# Patient Record
Sex: FEMALE | Race: WHITE | NOT HISPANIC OR LATINO | Employment: FULL TIME | URBAN - NONMETROPOLITAN AREA
[De-identification: names, ages, dates, MRNs, and addresses within clinical notes are randomized per-mention and may not be internally consistent; named-entity substitution may affect disease eponyms.]

---

## 2017-01-19 VITALS
SYSTOLIC BLOOD PRESSURE: 122 MMHG | DIASTOLIC BLOOD PRESSURE: 60 MMHG | HEIGHT: 66 IN | BODY MASS INDEX: 31.82 KG/M2 | WEIGHT: 198 LBS

## 2017-01-20 ENCOUNTER — OFFICE VISIT (OUTPATIENT)
Dept: OBSTETRICS AND GYNECOLOGY | Facility: CLINIC | Age: 27
End: 2017-01-20

## 2017-01-20 VITALS
SYSTOLIC BLOOD PRESSURE: 122 MMHG | HEIGHT: 67 IN | BODY MASS INDEX: 32.49 KG/M2 | DIASTOLIC BLOOD PRESSURE: 68 MMHG | WEIGHT: 207 LBS

## 2017-01-20 DIAGNOSIS — Z30.46 NEXPLANON REMOVAL: Primary | ICD-10-CM

## 2017-01-20 PROCEDURE — 11982 REMOVE DRUG IMPLANT DEVICE: CPT | Performed by: NURSE PRACTITIONER

## 2017-01-20 NOTE — MR AVS SNAPSHOT
Nelsy Bill   2017 9:30 AM   Office Visit    Dept Phone:  [phone]   Encounter #:  [id number]    Provider:  Shana Green CNM   Department:  [facility] OBSTETRICS AND GYNECOLOGY                Your Full Care Plan              Your Updated Medication List      Notice  As of 2017 10:08 AM    You have not been prescribed any medications.            Instructions     None    Patient Instructions History      Upcoming Appointments     Visit Type Date Time Department    NEXPLANON  2017  9:30 AM KAYLYNN BARRAZA    ANNUAL 2017  9:00 AM [place] DEREK BARRAZA      MyChart Signup     [facility] Clipik allows you to send messages to your doctor, view your test results, renew your prescriptions, schedule appointments, and more. To sign up, go to [employer] and click on the Sign Up Now link in the New User? box. Enter your Clipik Activation Code exactly as it appears below along with the last four digits of your Social Security Number and your Date of Birth () to complete the sign-up process. If you do not sign up before the expiration date, you must request a new code.    Clipik Activation Code: [email or web address]  Expires: 2/3/2017 10:08 AM    If you have questions, you can email [email or web address] or call [phone] to talk to our Clipik staff. Remember, Clipik is NOT to be used for urgent needs. For medical emergencies, dial 911.               Other Info from Your Visit           Your Appointments     2017  9:00 AM EST   Annual with Ada Mata PA-C   [facility] OBSTETRICS AND GYNECOLOGY (--)    [address] [address] [phone]              Allergies     No Known Allergies      Reason for Visit     Contraception Nexplanon removal, desires pregnancy, inserted in left arm on 16.       Vital Signs     Blood Pressure Height Weight Breastfeeding?  "Body Mass Index Smoking Status    122/68 67\" (170.2 cm) 207 lb (93.9 kg) Yes 32.42 kg/m2 Never Smoker        "

## 2017-01-20 NOTE — PROGRESS NOTES
Nexplanon Removal    Date of procedure:  1/20/2017    Risks and benefits discussed? yes  All questions answered? yes  Consents given by the patient  Written consent obtained? Yes        Local anesthesia used:  yes - 1.5 cc's of local anesthesia: 1% lidocaine with epinephrine    Procedure documentation:    The upper left arm was marked at the intended site of removal.  Betadine was used to cleanse the skin.  Local anesthesia was injected.  A vertical incision was created at the distal tip of the implant.  The implant was removed intact without difficulty.  Steri-strips were then placed across the site of insertion and the arm was wrapped.    She tolerated the procedure well.  There were no complications.  EBL was minimal.    Post procedure instructions: Remove the wrapping in 24 hours and the steri-strips in 5 days.    Follow up needed: PRN    This note was electronically signed.    Shana Green CNM   January 20, 2017

## 2017-02-09 ENCOUNTER — OFFICE VISIT (OUTPATIENT)
Dept: OBSTETRICS AND GYNECOLOGY | Facility: CLINIC | Age: 27
End: 2017-02-09

## 2017-02-09 VITALS
BODY MASS INDEX: 32.33 KG/M2 | DIASTOLIC BLOOD PRESSURE: 72 MMHG | WEIGHT: 206 LBS | HEIGHT: 67 IN | SYSTOLIC BLOOD PRESSURE: 114 MMHG

## 2017-02-09 DIAGNOSIS — Z01.419 ENCOUNTER FOR GYNECOLOGICAL EXAMINATION WITHOUT ABNORMAL FINDING: Primary | ICD-10-CM

## 2017-02-09 DIAGNOSIS — Z12.4 SCREENING FOR MALIGNANT NEOPLASM OF CERVIX: ICD-10-CM

## 2017-02-09 DIAGNOSIS — Z32.02 NEGATIVE PREGNANCY TEST: ICD-10-CM

## 2017-02-09 LAB
B-HCG UR QL: NEGATIVE
INTERNAL NEGATIVE CONTROL: NEGATIVE
INTERNAL POSITIVE CONTROL: POSITIVE
Lab: NORMAL

## 2017-02-09 PROCEDURE — 99395 PREV VISIT EST AGE 18-39: CPT | Performed by: PHYSICIAN ASSISTANT

## 2017-02-09 PROCEDURE — 81025 URINE PREGNANCY TEST: CPT | Performed by: PHYSICIAN ASSISTANT

## 2017-02-09 NOTE — PROGRESS NOTES
"Subjective   Chief Complaint   Patient presents with   • Gynecologic Exam     Nelsy Bill is a 26 y.o. year old  presenting to be seen for her annual exam.   She has no complaints or concerns today  She had nexplanon removed recently--it is OK if she conceives  LMP 2017      History reviewed. No pertinent past medical history.   No current outpatient prescriptions on file.   Allergies   Allergen Reactions   • Mastisol [Wound Dressing Adhesive]    • Other      Steri-strips      Past Surgical History   Procedure Laterality Date   •  section  ,      x's 2   • Knee acl reconstruction        Social History     Social History   • Marital status:      Spouse name: N/A   • Number of children: N/A   • Years of education: N/A     Occupational History   • Not on file.     Social History Main Topics   • Smoking status: Never Smoker   • Smokeless tobacco: Never Used   • Alcohol use No   • Drug use: No   • Sexual activity: Yes     Other Topics Concern   • Not on file     Social History Narrative      Family History   Problem Relation Age of Onset   • Prostate cancer Paternal Grandfather    • Cancer Paternal Grandmother    • Lung cancer Maternal Grandmother    • Prostate cancer Maternal Grandfather        The following portions of the patient's history were reviewed and updated as appropriate:problem list, current medications, allergies, past family history, past medical history, past social history and past surgical history.    Review of Systems        Objective   Visit Vitals   • /72   • Ht 66.5\" (168.9 cm)   • Wt 206 lb (93.4 kg)   • LMP 2017   • BMI 32.75 kg/m2       Physical Exam   Constitutional: She appears well-developed and well-nourished.   Pulmonary/Chest: Right breast exhibits no inverted nipple, no mass, no nipple discharge, no skin change and no tenderness. Left breast exhibits no inverted nipple, no mass, no nipple discharge, no skin change and no tenderness. " Breasts are symmetrical.   Abdominal: Soft. Normal appearance. She exhibits no distension and no mass. There is no tenderness.   Genitourinary: Vagina normal and uterus normal. There is no rash or tenderness on the right labia. There is no rash or tenderness on the left labia. Cervix exhibits no motion tenderness and no discharge. Right adnexum displays no mass and no tenderness. Left adnexum displays no mass and no tenderness.   Genitourinary Comments: Pap done   Skin: Skin is warm and dry.   Psychiatric: She has a normal mood and affect. Her behavior is normal.            Assessment /Plan      Nelsy was seen today for gynecologic exam.    Diagnoses and all orders for this visit:    Encounter for gynecological examination without abnormal finding    Screening for malignant neoplasm of cervix  -     Liquid-based Pap Smear, Screening                 This note was electronically signed.    Ada Mata PA-C   February 9, 2017

## 2017-09-06 ENCOUNTER — OFFICE VISIT (OUTPATIENT)
Dept: OBSTETRICS AND GYNECOLOGY | Facility: CLINIC | Age: 27
End: 2017-09-06

## 2017-09-06 VITALS
SYSTOLIC BLOOD PRESSURE: 110 MMHG | WEIGHT: 194 LBS | DIASTOLIC BLOOD PRESSURE: 64 MMHG | BODY MASS INDEX: 30.45 KG/M2 | HEIGHT: 67 IN

## 2017-09-06 DIAGNOSIS — N92.6 IRREGULAR MENSES: Primary | ICD-10-CM

## 2017-09-06 DIAGNOSIS — Z31.9 PATIENT DESIRES PREGNANCY: ICD-10-CM

## 2017-09-06 PROCEDURE — 99213 OFFICE O/P EST LOW 20 MIN: CPT | Performed by: PHYSICIAN ASSISTANT

## 2017-09-06 RX ORDER — PNV NO.95/FERROUS FUM/FOLIC AC 28MG-0.8MG
1 TABLET ORAL DAILY
Qty: 90 TABLET | Refills: 3 | Status: SHIPPED | OUTPATIENT
Start: 2017-09-06 | End: 2018-01-11 | Stop reason: CLARIF

## 2017-09-06 NOTE — PROGRESS NOTES
"Subjective   Chief Complaint   Patient presents with   • Follow-up     Patient wants to discuss conception.     /64  Ht 66.5\" (168.9 cm)  Wt 194 lb (88 kg)  BMI 30.84 kg/m2   Nelsy Bill is a 26 y.o. year old  presenting to be seen for desiring conception  She has 2 children ages 7 and 2 and had no difficulty conceiving previously  She had nexplanon removed 2017  She reports cycles slightly irregular since removal-, May 12-15, Roslyn 10-13, ,  spotted, Aug 28-30 had another bleed  She has not attempted to document ovulation thus far   healthy and is father of her 2 children   She had normal pap 2017    No past medical history on file.     Current Outpatient Prescriptions:   •  Prenatal Vit-Fe Fumarate-FA (PRENATAL VITAMINS) 28-0.8 MG tablet, Take 1 tablet by mouth Daily., Disp: 90 tablet, Rfl: 3   Allergies   Allergen Reactions   • Mastisol [Wound Dressing Adhesive]    • Other      Steri-strips      Past Surgical History:   Procedure Laterality Date   •  SECTION  ,     x's 2   • KNEE ACL RECONSTRUCTION        Social History     Social History   • Marital status:      Spouse name: N/A   • Number of children: N/A   • Years of education: N/A     Occupational History   • Not on file.     Social History Main Topics   • Smoking status: Never Smoker   • Smokeless tobacco: Never Used   • Alcohol use No   • Drug use: No   • Sexual activity: Yes     Other Topics Concern   • Not on file     Social History Narrative      Family History   Problem Relation Age of Onset   • Prostate cancer Paternal Grandfather    • Cancer Paternal Grandmother    • Lung cancer Maternal Grandmother    • Prostate cancer Maternal Grandfather        The following portions of the patient's history were reviewed and updated as appropriate:problem list, current medications, allergies, past family history, past medical history, past social history and " past surgical history.    Review of Systems   Constitutional: Negative.    Gastrointestinal: Negative.    Genitourinary: Negative.         Objective     Physical Exam  Nelsy was seen today for follow-up.    Diagnoses and all orders for this visit:    Irregular menses  -     TSH; Future  -     Progesterone; Future    Patient desires pregnancy    Other orders  -     Prenatal Vit-Fe Fumarate-FA (PRENATAL VITAMINS) 28-0.8 MG tablet; Take 1 tablet by mouth Daily.             This note was electronically signed.    Ada Mata PA-C   September 6, 2017

## 2017-09-06 NOTE — PATIENT INSTRUCTIONS
Will check TSH and serum progesterone level day 21 or 22 of cycle  Also recommend starting ovulation predictor kit  Discussed timed intercourse   Start prenatal vitamins  Follow up in 3 months if no conception

## 2017-09-11 ENCOUNTER — RESULTS ENCOUNTER (OUTPATIENT)
Dept: OBSTETRICS AND GYNECOLOGY | Facility: CLINIC | Age: 27
End: 2017-09-11

## 2017-09-11 DIAGNOSIS — N92.6 IRREGULAR MENSES: ICD-10-CM

## 2017-09-19 LAB
PROGEST SERPL-MCNC: 5.5 NG/ML
TSH SERPL DL<=0.005 MIU/L-ACNC: 1.9 MIU/ML (ref 0.47–4.68)

## 2017-11-01 ENCOUNTER — INITIAL PRENATAL (OUTPATIENT)
Dept: OBSTETRICS AND GYNECOLOGY | Facility: CLINIC | Age: 27
End: 2017-11-01

## 2017-11-01 VITALS — DIASTOLIC BLOOD PRESSURE: 64 MMHG | BODY MASS INDEX: 31.32 KG/M2 | WEIGHT: 197 LBS | SYSTOLIC BLOOD PRESSURE: 124 MMHG

## 2017-11-01 DIAGNOSIS — Z34.90 PREGNANCY, UNSPECIFIED GESTATIONAL AGE: Primary | ICD-10-CM

## 2017-11-01 DIAGNOSIS — Z34.91 NORMAL PREGNANCY, FIRST TRIMESTER: ICD-10-CM

## 2017-11-01 PROCEDURE — 90471 IMMUNIZATION ADMIN: CPT | Performed by: NURSE PRACTITIONER

## 2017-11-01 PROCEDURE — 90686 IIV4 VACC NO PRSV 0.5 ML IM: CPT | Performed by: NURSE PRACTITIONER

## 2017-11-01 PROCEDURE — 99214 OFFICE O/P EST MOD 30 MIN: CPT | Performed by: NURSE PRACTITIONER

## 2017-11-01 NOTE — PROGRESS NOTES
Chief Complaint   Patient presents with   • Initial Prenatal Visit     LMP 17, 8w6d by scan today, last pap 17 WNL, no complaints         HPI  , 8w6d presents to our office today for confirmation of pregnancy.  She reports 1st trimester discomforts of pregnancy including, fatigue and nausea and vomiting - able to tolerate fluids.    Previous C/S's at term  Working at [email or web address] work  Taking PNV's   Last pap - past year - normal         History reviewed. No pertinent past medical history.     Current Outpatient Prescriptions:   •  Prenatal Vit-Fe Fumarate-FA (PRENATAL VITAMINS) 28-0.8 MG tablet, Take 1 tablet by mouth Daily., Disp: 90 tablet, Rfl: 3   Allergies   Allergen Reactions   • Mastisol [Wound Dressing Adhesive]    • Other      Steri-strips      Past Surgical History:   Procedure Laterality Date   •  SECTION  ,     x's 2   • KNEE ACL RECONSTRUCTION         Social History     Social History   • Marital status:      Spouse name: N/A   • Number of children: N/A   • Years of education: N/A     Occupational History   • Not on file.     Social History Main Topics   • Smoking status: Never Smoker   • Smokeless tobacco: Never Used   • Alcohol use No   • Drug use: No   • Sexual activity: Yes     Other Topics Concern   • Not on file     Social History Narrative      Family History   Problem Relation Age of Onset   • Prostate cancer Paternal Grandfather    • Cancer Paternal Grandmother    • Lung cancer Maternal Grandmother    • Prostate cancer Maternal Grandfather        The following portions of the patient's history were reviewed and updated as appropriate:problem list, current medications, allergies, past family history, past medical history, past social history and past surgical history.    ROS    Pertinent items are noted in HPI, all other systems reviewed and negative    Physical Exam  /64  Wt 197 lb (89.4 kg)  LMP 2017 (Exact Date)  BMI 31.32 kg/m2     Psych:  Altert and oriented to time, place and person  Mood and affect appropriate   General: well developed; well nourished  no acute distress  HEENT: unremarkable  Neck: The neck is supple and the trachea is midline  Musculoskeletal: Normal gait  Full range of motion  Lungs:  breathing is unlabored  Back: Negative CVAT  Abdomen: Soft, non-tender, no organomegaly  Lower Extremities: LE: Neg edema and  Neg. Clonus      MDM  Impression:  Problems/Risks: Normal Pregnancy  Previous C/S with planned repeat C/S   Tests done today: Jasmyne SELLERSB labs with HIV & option CF screening  TVS for dating   Topics discussed: Jasmyne PISANO education including nutrition, exercise, OTCmeds  T-dap &/or flu vaccination  adequate rest and fluids  encouraged questions - call prn    Tests next visit: none

## 2017-11-02 LAB
ABO GROUP BLD: (no result)
BASOPHILS # BLD AUTO: 0 X10E3/UL (ref 0–0.2)
BASOPHILS NFR BLD AUTO: 1 %
BLD GP AB SCN SERPL QL: NEGATIVE
EOSINOPHIL # BLD AUTO: 0.1 X10E3/UL (ref 0–0.4)
EOSINOPHIL NFR BLD AUTO: 2 %
ERYTHROCYTE [DISTWIDTH] IN BLOOD BY AUTOMATED COUNT: 13.4 % (ref 12.3–15.4)
HBV SURFACE AG SERPL QL IA: NEGATIVE
HCT VFR BLD AUTO: 38.2 % (ref 34–46.6)
HGB BLD-MCNC: 13.3 G/DL (ref 11.1–15.9)
HIV 1+2 AB+HIV1 P24 AG SERPL QL IA: NON REACTIVE
IMM GRANULOCYTES # BLD: 0 X10E3/UL (ref 0–0.1)
IMM GRANULOCYTES NFR BLD: 0 %
LYMPHOCYTES # BLD AUTO: 1.5 X10E3/UL (ref 0.7–3.1)
LYMPHOCYTES NFR BLD AUTO: 26 %
MCH RBC QN AUTO: 30.2 PG (ref 26.6–33)
MCHC RBC AUTO-ENTMCNC: 34.8 G/DL (ref 31.5–35.7)
MCV RBC AUTO: 87 FL (ref 79–97)
MONOCYTES # BLD AUTO: 0.5 X10E3/UL (ref 0.1–0.9)
MONOCYTES NFR BLD AUTO: 8 %
NEUTROPHILS # BLD AUTO: 3.7 X10E3/UL (ref 1.4–7)
NEUTROPHILS NFR BLD AUTO: 63 %
PLATELET # BLD AUTO: 304 X10E3/UL (ref 150–379)
RBC # BLD AUTO: 4.4 X10E6/UL (ref 3.77–5.28)
RH BLD: POSITIVE
RPR SER QL: NON REACTIVE
RUBV IGG SERPL IA-ACNC: 1.16 INDEX
WBC # BLD AUTO: 5.8 X10E3/UL (ref 3.4–10.8)

## 2017-11-27 ENCOUNTER — ROUTINE PRENATAL (OUTPATIENT)
Dept: OBSTETRICS AND GYNECOLOGY | Facility: CLINIC | Age: 27
End: 2017-11-27

## 2017-11-27 VITALS — SYSTOLIC BLOOD PRESSURE: 122 MMHG | WEIGHT: 197 LBS | BODY MASS INDEX: 31.32 KG/M2 | DIASTOLIC BLOOD PRESSURE: 62 MMHG

## 2017-11-27 DIAGNOSIS — Z34.92 NORMAL PREGNANCY, SECOND TRIMESTER: Primary | ICD-10-CM

## 2017-11-27 PROCEDURE — 0502F SUBSEQUENT PRENATAL CARE: CPT | Performed by: NURSE PRACTITIONER

## 2017-11-27 NOTE — PATIENT INSTRUCTIONS
Second Trimester of Pregnancy  The second trimester is from week 13 through week 28, months 4 through 6. The second trimester is often a time when you feel your best. Your body has also adjusted to being pregnant, and you begin to feel better physically. Usually, morning sickness has lessened or quit completely, you may have more energy, and you may have an increase in appetite. The second trimester is also a time when the fetus is growing rapidly. At the end of the sixth month, the fetus is about 9 inches long and weighs about 1½ pounds. You will likely begin to feel the baby move (quickening) between 18 and 20 weeks of the pregnancy.  BODY CHANGES  Your body goes through many changes during pregnancy. The changes vary from woman to woman.   · Your weight will continue to increase. You will notice your lower abdomen bulging out.  · You may begin to get stretch marks on your hips, abdomen, and breasts.  · You may develop headaches that can be relieved by medicines approved by your health care provider.  · You may urinate more often because the fetus is pressing on your bladder.  · You may develop or continue to have heartburn as a result of your pregnancy.  · You may develop constipation because certain hormones are causing the muscles that push waste through your intestines to slow down.  · You may develop hemorrhoids or swollen, bulging veins (varicose veins).  · You may have back pain because of the weight gain and pregnancy hormones relaxing your joints between the bones in your pelvis and as a result of a shift in weight and the muscles that support your balance.  · Your breasts will continue to grow and be tender.  · Your gums may bleed and may be sensitive to brushing and flossing.  · Dark spots or blotches (chloasma, mask of pregnancy) may develop on your face. This will likely fade after the baby is born.  · A dark line from your belly button to the pubic area (linea nigra) may appear. This will likely fade  after the baby is born.  · You may have changes in your hair. These can include thickening of your hair, rapid growth, and changes in texture. Some women also have hair loss during or after pregnancy, or hair that feels dry or thin. Your hair will most likely return to normal after your baby is born.  WHAT TO EXPECT AT YOUR PRENATAL VISITS  During a routine prenatal visit:  · You will be weighed to make sure you and the fetus are growing normally.  · Your blood pressure will be taken.  · Your abdomen will be measured to track your baby's growth.  · The fetal heartbeat will be listened to.  · Any test results from the previous visit will be discussed.  Your health care provider may ask you:  · How you are feeling.  · If you are feeling the baby move.  · If you have had any abnormal symptoms, such as leaking fluid, bleeding, severe headaches, or abdominal cramping.  · If you are using any tobacco products, including cigarettes, chewing tobacco, and electronic cigarettes.  · If you have any questions.  Other tests that may be performed during your second trimester include:  · Blood tests that check for:  ¨ Low iron levels (anemia).  ¨ Gestational diabetes (between 24 and 28 weeks).  ¨ Rh antibodies.  · Urine tests to check for infections, diabetes, or protein in the urine.  · An ultrasound to confirm the proper growth and development of the baby.  · An amniocentesis to check for possible genetic problems.  · Fetal screens for spina bifida and Down syndrome.  · HIV (human immunodeficiency virus) testing. Routine prenatal testing includes screening for HIV, unless you choose not to have this test.  HOME CARE INSTRUCTIONS   · Avoid all smoking, herbs, alcohol, and unprescribed drugs. These chemicals affect the formation and growth of the baby.  · Do not use any tobacco products, including cigarettes, chewing tobacco, and electronic cigarettes. If you need help quitting, ask your health care provider. You may receive  counseling support and other resources to help you quit.  · Follow your health care provider's instructions regarding medicine use. There are medicines that are either safe or unsafe to take during pregnancy.  · Exercise only as directed by your health care provider. Experiencing uterine cramps is a good sign to stop exercising.  · Continue to eat regular, healthy meals.  · Wear a good support bra for breast tenderness.  · Do not use hot tubs, steam rooms, or saunas.  · Wear your seat belt at all times when driving.  · Avoid raw meat, uncooked cheese, cat litter boxes, and soil used by cats. These carry germs that can cause birth defects in the baby.  · Take your prenatal vitamins.  · Take 7387-8405 mg of calcium daily starting at the 20th week of pregnancy until you deliver your baby.  · Try taking a stool softener (if your health care provider approves) if you develop constipation. Eat more high-fiber foods, such as fresh vegetables or fruit and whole grains. Drink plenty of fluids to keep your urine clear or pale yellow.  · Take warm sitz baths to soothe any pain or discomfort caused by hemorrhoids. Use hemorrhoid cream if your health care provider approves.  · If you develop varicose veins, wear support hose. Elevate your feet for 15 minutes, 3-4 times a day. Limit salt in your diet.  · Avoid heavy lifting, wear low heel shoes, and practice good posture.  · Rest with your legs elevated if you have leg cramps or low back pain.  · Visit your dentist if you have not gone yet during your pregnancy. Use a soft toothbrush to brush your teeth and be gentle when you floss.  · A sexual relationship may be continued unless your health care provider directs you otherwise.  · Continue to go to all your prenatal visits as directed by your health care provider.  SEEK MEDICAL CARE IF:   · You have dizziness.  · You have mild pelvic cramps, pelvic pressure, or nagging pain in the abdominal area.  · You have persistent nausea,  vomiting, or diarrhea.  · You have a bad smelling vaginal discharge.  · You have pain with urination.  SEEK IMMEDIATE MEDICAL CARE IF:   · You have a fever.  · You are leaking fluid from your vagina.  · You have spotting or bleeding from your vagina.  · You have severe abdominal cramping or pain.  · You have rapid weight gain or loss.  · You have shortness of breath with chest pain.  · You notice sudden or extreme swelling of your face, hands, ankles, feet, or legs.  · You have not felt your baby move in over an hour.  · You have severe headaches that do not go away with medicine.  · You have vision changes.     This information is not intended to replace advice given to you by your health care provider. Make sure you discuss any questions you have with your health care provider.

## 2017-11-27 NOTE — PROGRESS NOTES
[id number]  Chief Complaint   Patient presents with   • Routine Prenatal Visit     no complaints         HPI  , 12w4d reports doing well - though 1st trimester discomforts continue - though improving   Has done flu vac  Hx of G1 preeclampsia - G2 normal pregnancy     ROS:  GI: Nausea - improved as compared to the prior visit; Constipation - No; Diarrhea - No    Neuro: Headache - No; Visual change - No        EXAM  General Appearance: relaxed   Lungs: Breathing unlabored  Abdomen:  See flow sheet for Fundal ht, FM, FHT's  LE: Neg edema    MDM  Impression:  Problems/Risk: Normal Pregnancy  Previous C/S with planned repeat C/S  .Hx of preeclampsia G1      First trimester discomforts of preg continue   Topics discussed: Adequate rest and fluids  option for phenergan - declined   Hx preeclampsia - rev'd - may do ASA 81 mg daily    Tests next visit: quad screen optional      OB History      Para Term  AB Living    3 2 2       SAB TAB Ectopic Multiple Live Births                  History reviewed. No pertinent past medical history.    Past Surgical History:   Procedure Laterality Date   •  SECTION  ,     x's 2   • KNEE ACL RECONSTRUCTION         Family History   Problem Relation Age of Onset   • Prostate cancer Paternal Grandfather    • Cancer Paternal Grandmother    • Lung cancer Maternal Grandmother    • Prostate cancer Maternal Grandfather        Social History     Social History   • Marital status:      Spouse name: N/A   • Number of children: N/A   • Years of education: N/A     Occupational History   • Not on file.     Social History Main Topics   • Smoking status: Never Smoker   • Smokeless tobacco: Never Used   • Alcohol use No   • Drug use: No   • Sexual activity: Yes     Other Topics Concern   • Not on file     Social History Narrative

## 2017-12-07 ENCOUNTER — ROUTINE PRENATAL (OUTPATIENT)
Dept: OBSTETRICS AND GYNECOLOGY | Facility: CLINIC | Age: 27
End: 2017-12-07

## 2017-12-07 ENCOUNTER — HOSPITAL ENCOUNTER (EMERGENCY)
Facility: HOSPITAL | Age: 27
Discharge: HOME | End: 2017-12-07
Attending: EMERGENCY MEDICINE | Admitting: EMERGENCY MEDICINE

## 2017-12-07 VITALS
DIASTOLIC BLOOD PRESSURE: 71 MMHG | TEMPERATURE: 98.3 F | OXYGEN SATURATION: 98 % | HEART RATE: 86 BPM | WEIGHT: 200 LBS | RESPIRATION RATE: 16 BRPM | SYSTOLIC BLOOD PRESSURE: 125 MMHG | HEIGHT: 66 IN | BODY MASS INDEX: 32.14 KG/M2

## 2017-12-07 VITALS — BODY MASS INDEX: 32.3 KG/M2 | WEIGHT: 200 LBS | DIASTOLIC BLOOD PRESSURE: 68 MMHG | SYSTOLIC BLOOD PRESSURE: 132 MMHG

## 2017-12-07 DIAGNOSIS — M54.31 SCIATICA OF RIGHT SIDE: Primary | ICD-10-CM

## 2017-12-07 DIAGNOSIS — G89.29 CHRONIC RIGHT-SIDED LOW BACK PAIN WITH RIGHT-SIDED SCIATICA: Primary | ICD-10-CM

## 2017-12-07 DIAGNOSIS — M54.9 BACK PAIN IN PREGNANCY: ICD-10-CM

## 2017-12-07 DIAGNOSIS — O99.891 BACK PAIN IN PREGNANCY: ICD-10-CM

## 2017-12-07 DIAGNOSIS — M54.41 CHRONIC RIGHT-SIDED LOW BACK PAIN WITH RIGHT-SIDED SCIATICA: Primary | ICD-10-CM

## 2017-12-07 PROCEDURE — 0502F SUBSEQUENT PRENATAL CARE: CPT | Performed by: MIDWIFE

## 2017-12-07 PROCEDURE — 99283 EMERGENCY DEPT VISIT LOW MDM: CPT

## 2017-12-07 RX ORDER — CYCLOBENZAPRINE HCL 10 MG
10 TABLET ORAL EVERY 8 HOURS PRN
Qty: 30 TABLET | Refills: 0 | Status: SHIPPED | OUTPATIENT
Start: 2017-12-07 | End: 2018-06-04 | Stop reason: HOSPADM

## 2017-12-07 RX ORDER — ACETAMINOPHEN 500 MG
1000 TABLET ORAL ONCE
Status: COMPLETED | OUTPATIENT
Start: 2017-12-07 | End: 2017-12-07

## 2017-12-07 RX ADMIN — ACETAMINOPHEN 1000 MG: 500 TABLET, FILM COATED ORAL at 05:28

## 2017-12-07 NOTE — PROGRESS NOTES
Chief Complaint   Patient presents with   • Pregnancy Problem     seen in ED last night for severe back pain, told to follow up here today         HPI:  , 14w0d reports she was seen in ED this am with C/O low back and right sciatica pain. They gave her Tylenol and told her to F/U here today. Pain started 2 days ago and radiates down right leg. Leg sometimes goes numb.    ROS:    /68  Wt 90.7 kg (200 lb)  LMP 2017 (Exact Date)  BMI 32.3 kg/m2 -See Prenatal Assessment    Exam:    Abdomen: Soft, nontender. Fundal ht/ FHT's / FM see prenatal flow sheet, fundus @ U-3  Extremities:  See prenatal flowsheet  V/E: deferred    Assessment:    IUP at 14w0d    MDM:    Impression:            Supervision of low risk pregnancy  right sciatica  Tests done today:  none  Topics discussed:  Flexeril PRN, referral to PT, heat rest  Tests next visit:      none  Next visit:               As scheduled       Bailey Larry CNM  2017

## 2017-12-07 NOTE — ED PROVIDER NOTES
Subjective   History of Present Illness  TRIAGE CHIEF COMPLAINT:   Chief Complaint   Patient presents with   • Back Pain         HPI: Nelsy Bill   is a 26 y.o. female   who presents to the emergency department complaining of Back pain.  Patient with history of lower back pain, currently 15 weeks pregnant.  Describes exacerbation of lower back pain with sciatica on the right side approximately 2 days ago.  Patient took one tablet of Tylenol around midnight but nothing since.  Reports pain radiating from the right lumbosacral region down her right leg and a sciatic nerve distribution.  Pain is worse with change in position.  Denies any recent injury or trauma.  Denies focal weakness, bladder/bowel incontinence, fever, chills, urinary symptoms.  Pain is currently sharp, severe.           Review of Systems   All other systems reviewed and are negative.      History reviewed. No pertinent past medical history.    Allergies   Allergen Reactions   • Mastisol [Wound Dressing Adhesive]    • Other      Steri-strips       Past Surgical History:   Procedure Laterality Date   •  SECTION  ,     x's 2   • KNEE ACL RECONSTRUCTION         Family History   Problem Relation Age of Onset   • Prostate cancer Paternal Grandfather    • Cancer Paternal Grandmother    • Lung cancer Maternal Grandmother    • Prostate cancer Maternal Grandfather        Social History     Social History   • Marital status:      Spouse name: N/A   • Number of children: N/A   • Years of education: N/A     Social History Main Topics   • Smoking status: Never Smoker   • Smokeless tobacco: Never Used   • Alcohol use No   • Drug use: No   • Sexual activity: Yes     Other Topics Concern   • None     Social History Narrative           Objective   Physical Exam    CONSTITUTIONAL: Awake, oriented, appears in pain but non-toxic   HENT: Atraumatic, normocephalic, oral mucosa pink and moist, airway patent. Nares patent without drainage.  External ears normal.   EYES: Conjunctiva clear, EOMI, PERRL   NECK: Trachea midline, non-tender, supple   CARDIOVASCULAR: Normal heart rate, Normal rhythm, No murmurs, rubs, gallops   PULMONARY/CHEST: Clear to auscultation, no rhonchi, wheezes, or rales. Symmetrical breath sounds. Non-tender.   ABDOMINAL: Non-distended, soft, non-tender - no rebound or guarding. BS normal.   BACK: Right lumbosacral tenderness with radiation of pain to the right hip and right lower extremity in a sciatic nerve distribution  NEUROLOGIC: Non-focal, moving all four extremities, no gross sensory or motor deficits.   EXTREMITIES: No clubbing, cyanosis, or edema   SKIN: Warm, Dry, No erythema, No rash     No orders to display           EKG:         Procedures         ED Course  ED Course          ED COURSE / MEDICAL DECISION MAKING:   Nursing notes reviewed.    Acute on chronic lower back pain with sciatica.  No new injury.  No red flags.  Patient is currently 15 weeks pregnant.  Plan for Tylenol, OB/GYN follow-up later today for reevaluation and possible stronger prescription.    DECISION TO DISCHARGE/ADMIT: see ED care timeline       Electronically signed by: Luis Armando Chance MD, 12/7/2017 5:37 AM              [facility]    Final diagnoses:   Chronic right-sided low back pain with right-sided sciatica            Luis Armando Chance MD  12/07/17 0539

## 2017-12-22 ENCOUNTER — ROUTINE PRENATAL (OUTPATIENT)
Dept: OBSTETRICS AND GYNECOLOGY | Facility: CLINIC | Age: 27
End: 2017-12-22

## 2017-12-22 VITALS — DIASTOLIC BLOOD PRESSURE: 70 MMHG | BODY MASS INDEX: 31.97 KG/M2 | WEIGHT: 198 LBS | SYSTOLIC BLOOD PRESSURE: 128 MMHG

## 2017-12-22 DIAGNOSIS — Z34.92 ENCOUNTER FOR SUPERVISION OF LOW-RISK PREGNANCY IN SECOND TRIMESTER: Primary | ICD-10-CM

## 2017-12-22 DIAGNOSIS — M54.31 SCIATICA OF RIGHT SIDE: ICD-10-CM

## 2017-12-22 PROCEDURE — 0502F SUBSEQUENT PRENATAL CARE: CPT | Performed by: OBSTETRICS & GYNECOLOGY

## 2017-12-22 NOTE — PROGRESS NOTES
Chief Complaint   Patient presents with   • Routine Prenatal Visit       HPI: Nelsy is a  currently at 16w1d who today reports the following:  Contractions - No; Leaking - No; Vaginal bleeding -  No; Heartburn - No.  Pt reports continued low back pain and right sciatica.  Pt reports pain has improved some though since last visit.  ROS:   GI:   Nausea - No; Constipation - No; Diarrhea - No.   Neuro:  Headache - No; Visual disturbances - No.    EXAM:   Vitals:  See prenatal flowsheet as noted and reviewed   Abdomen:   See prenatal flowsheet as noted and reviewed   Pelvic:  See prenatal flowsheet as noted and reviewed   Urine:  See prenatal flowsheet as noted and reviewed     Lab Results   Component Value Date    ABO O 2017    RH Positive 2017    ABSCRN Negative 2017       MDM:  Impression: Supervision of low risk pregnancy  Low back pain/right sciatica   Tests done today: maternal quad screen - pt desires  Discussed genetic screening and pt desires above test   Topics discussed: Pt instructed in trial with maternity belt; heat and tylenol   Tests next visit: U/S for anatomic screening     This note was electronically signed.  Swetha Hernandez M.D.

## 2017-12-27 LAB
2ND TRIMESTER 4 SCREEN SERPL-IMP: NORMAL
2ND TRIMESTER 4 SCREEN SERPL-IMP: NORMAL
AFP ADJ MOM SERPL: 1.04
AFP SERPL-MCNC: 29 NG/ML
AGE AT DELIVERY: 27.4 YEARS
FET TS 18 RISK FROM MAT AGE: NORMAL
FET TS 21 RISK FROM MAT AGE: 898
GA METHOD: NORMAL
GA: 16.1 WEEKS
HCG ADJ MOM SERPL: 1
HCG SERPL-ACNC: NORMAL MIU/ML
IDDM PATIENT QL: NO
INHIBIN A ADJ MOM SERPL: 0.56
INHIBIN A SERPL-MCNC: 86.52 PG/ML
LABORATORY COMMENT REPORT: NORMAL
MULTIPLE PREGNANCY: NO
NEURAL TUBE DEFECT RISK FETUS: NORMAL %
RESULT: NORMAL
TS 18 RISK FETUS: NORMAL
TS 21 RISK FETUS: NORMAL
U ESTRIOL ADJ MOM SERPL: 0.93
U ESTRIOL SERPL-MCNC: 0.72 NG/ML

## 2018-01-11 ENCOUNTER — ROUTINE PRENATAL (OUTPATIENT)
Dept: OBSTETRICS AND GYNECOLOGY | Facility: CLINIC | Age: 28
End: 2018-01-11

## 2018-01-11 VITALS — BODY MASS INDEX: 33.1 KG/M2 | WEIGHT: 205 LBS | DIASTOLIC BLOOD PRESSURE: 66 MMHG | SYSTOLIC BLOOD PRESSURE: 128 MMHG

## 2018-01-11 DIAGNOSIS — Z34.82 ENCOUNTER FOR SUPERVISION OF OTHER NORMAL PREGNANCY IN SECOND TRIMESTER: Primary | ICD-10-CM

## 2018-01-11 DIAGNOSIS — M54.31 SCIATICA OF RIGHT SIDE: ICD-10-CM

## 2018-01-11 PROBLEM — Z98.891 HX OF CESAREAN SECTION: Status: ACTIVE | Noted: 2018-01-11

## 2018-01-11 PROBLEM — Z34.90 SUPERVISION OF NORMAL PREGNANCY: Status: ACTIVE | Noted: 2018-01-11

## 2018-01-11 PROCEDURE — 0502F SUBSEQUENT PRENATAL CARE: CPT | Performed by: MIDWIFE

## 2018-01-11 NOTE — PROGRESS NOTES
Chief Complaint   Patient presents with   • Routine Prenatal Visit     anatomy scan today, no complaints        HPI: Nelsy is a  currently at 19w0d who today reports the following:   Leaking - No; Heartburn - No. No further sciatic pain    ROS:   GI:   Nausea - No; Constipation - No;    Neuro:  Headache - No; Visual disturbances - No.    EXAM:   Vitals:  See prenatal flowsheet, /66, Wt +7#   Abdomen:   See prenatal flowsheet, soft, nontender, fundus @ U-1   Pelvic:  See prenatal flowsheet   Urine:  See prenatal flowsheet    Lab Results   Component Value Date    ABO O 2017    RH Positive 2017    ABSCRN Negative 2017       MDM:  Impression: Supervision of low risk pregnancy  Previous C/S with planned repeat C/S   Tests done today: U/S for anatomic screening - anatomy completely seen today   Declined quad screen   Topics discussed: kick counts and fetal movement   Tests next visit: none                RTO:                        4 weeks    This note was electronically signed.  Bailey Larry, APRN  2018

## 2018-02-05 ENCOUNTER — ROUTINE PRENATAL (OUTPATIENT)
Dept: OBSTETRICS AND GYNECOLOGY | Facility: CLINIC | Age: 28
End: 2018-02-05

## 2018-02-05 VITALS — WEIGHT: 210 LBS | SYSTOLIC BLOOD PRESSURE: 124 MMHG | DIASTOLIC BLOOD PRESSURE: 68 MMHG | BODY MASS INDEX: 33.91 KG/M2

## 2018-02-05 DIAGNOSIS — M54.31 SCIATICA OF RIGHT SIDE: ICD-10-CM

## 2018-02-05 DIAGNOSIS — Z34.92 NORMAL PREGNANCY, SECOND TRIMESTER: Primary | ICD-10-CM

## 2018-02-05 PROCEDURE — 0502F SUBSEQUENT PRENATAL CARE: CPT | Performed by: NURSE PRACTITIONER

## 2018-02-05 NOTE — PROGRESS NOTES
[id number]  Chief Complaint   Patient presents with   • Routine Prenatal Visit     No Complaints,Good FEtal Movement        HPI  , 22w4d reports doing well   ROS  /68  Wt 95.3 kg (210 lb)  LMP 2017 (Exact Date)  BMI 33.91 kg/m2 -See Prenatal Assessment    ROS:  GI: Nausea - No; Constipation - No; Diarrhea - No    Neuro: Headache - No; Visual change - No      EXAM  General Appearance:  Lungs: Breathing unlabored  Abdomen:  See flow sheet for Fundal ht, FM, FHT's  LE: Neg edema    MDM  Impression:  Problems/Risk Normal Pregnancy      Tests done today: none   Topics discussed: continue to note good FM  Nutririon rev'd & exercise  encouraged questions - call prn    Tests next visit: none  Glucola and CBC     OB History      Para Term  AB Living    3 2 2   2    SAB TAB Ectopic Multiple Live Births        2          Past Medical History:   Diagnosis Date   • Patient denies medical problems        Past Surgical History:   Procedure Laterality Date   •  SECTION  ,     x's 2   • KNEE ACL RECONSTRUCTION         Family History   Problem Relation Age of Onset   • Prostate cancer Paternal Grandfather    • Cancer Paternal Grandmother    • Lung cancer Maternal Grandmother    • Prostate cancer Maternal Grandfather        Social History     Social History   • Marital status:      Spouse name: N/A   • Number of children: N/A   • Years of education: N/A     Occupational History   • Not on file.     Social History Main Topics   • Smoking status: Never Smoker   • Smokeless tobacco: Never Used   • Alcohol use No   • Drug use: No   • Sexual activity: Yes     Other Topics Concern   • Not on file     Social History Narrative

## 2018-03-05 ENCOUNTER — RESULTS ENCOUNTER (OUTPATIENT)
Dept: OBSTETRICS AND GYNECOLOGY | Facility: CLINIC | Age: 28
End: 2018-03-05

## 2018-03-05 DIAGNOSIS — Z34.92 NORMAL PREGNANCY, SECOND TRIMESTER: ICD-10-CM

## 2018-03-06 ENCOUNTER — ROUTINE PRENATAL (OUTPATIENT)
Dept: OBSTETRICS AND GYNECOLOGY | Facility: CLINIC | Age: 28
End: 2018-03-06

## 2018-03-06 VITALS — SYSTOLIC BLOOD PRESSURE: 126 MMHG | DIASTOLIC BLOOD PRESSURE: 70 MMHG | WEIGHT: 217 LBS | BODY MASS INDEX: 35.04 KG/M2

## 2018-03-06 DIAGNOSIS — Z98.891 HX OF CESAREAN SECTION: ICD-10-CM

## 2018-03-06 DIAGNOSIS — Z34.82 ENCOUNTER FOR SUPERVISION OF OTHER NORMAL PREGNANCY IN SECOND TRIMESTER: Primary | ICD-10-CM

## 2018-03-06 LAB
BASOPHILS # BLD AUTO: 0.03 10*3/MM3 (ref 0–0.2)
BASOPHILS NFR BLD AUTO: 0.3 % (ref 0–2.5)
EOSINOPHIL # BLD AUTO: 0.19 10*3/MM3 (ref 0–0.7)
EOSINOPHIL NFR BLD AUTO: 1.7 % (ref 0–7)
ERYTHROCYTE [DISTWIDTH] IN BLOOD BY AUTOMATED COUNT: 14.7 % (ref 11.5–14.5)
GLUCOSE 1H P 50 G GLC PO SERPL-MCNC: 97 MG/DL
HCT VFR BLD AUTO: 38.4 % (ref 37–47)
HGB BLD-MCNC: 12.8 G/DL (ref 12–16)
IMM GRANULOCYTES # BLD: 0.05 10*3/MM3 (ref 0–0.06)
IMM GRANULOCYTES NFR BLD: 0.4 % (ref 0–0.6)
LYMPHOCYTES # BLD AUTO: 1.32 10*3/MM3 (ref 0.6–3.4)
LYMPHOCYTES NFR BLD AUTO: 11.8 % (ref 10–50)
MCH RBC QN AUTO: 31.1 PG (ref 27–31)
MCHC RBC AUTO-ENTMCNC: 33.3 G/DL (ref 30–37)
MCV RBC AUTO: 93.4 FL (ref 81–99)
MONOCYTES # BLD AUTO: 0.58 10*3/MM3 (ref 0–0.9)
MONOCYTES NFR BLD AUTO: 5.2 % (ref 0–12)
NEUTROPHILS # BLD AUTO: 8.97 10*3/MM3 (ref 2–6.9)
NEUTROPHILS NFR BLD AUTO: 80.6 % (ref 37–80)
NRBC BLD AUTO-RTO: 0 /100 WBC (ref 0–0)
PLATELET # BLD AUTO: 254 10*3/MM3 (ref 130–400)
RBC # BLD AUTO: 4.11 10*6/MM3 (ref 4.2–5.4)
WBC # BLD AUTO: 11.14 10*3/MM3 (ref 4.8–10.8)

## 2018-03-06 PROCEDURE — 0502F SUBSEQUENT PRENATAL CARE: CPT | Performed by: MIDWIFE

## 2018-03-06 NOTE — PROGRESS NOTES
Chief Complaint   Patient presents with   • Routine Prenatal Visit     karla today, no complaints        HPI: Nelsy is a  currently at 26w5d who today reports the following:   Leaking - No; Heartburn - No. Baby is active. States she has been eating a lot of fruit, bananas and oranges.  Wants repeat CSection.    ROS:   GI:   Nausea - No; Constipation - No;    Neuro:  Headache - No; Visual disturbances - No.    EXAM:   Vitals:  See prenatal flowsheet, /70, Wt +7#   Abdomen:   See prenatal flowsheet, soft, nontender   Pelvic:  See prenatal flowsheet   Urine:  See prenatal flowsheet    Lab Results   Component Value Date    ABO O 2017    RH Positive 2017    ABSCRN Negative 2017       MDM:  Impression: Supervision of low risk pregnancy  Previous C/S with planned repeat C/S   Tests done today: GCT  HgB   Topics discussed: kick counts and fetal movement   labor signs and symptoms  Discussed repeat CSection dates and will need to schedule next visit.   Tests next visit: none                RTO:                        2 weeks    This note was electronically signed.  Bailey Larry, APRN  3/6/2018

## 2018-03-20 ENCOUNTER — ROUTINE PRENATAL (OUTPATIENT)
Dept: OBSTETRICS AND GYNECOLOGY | Facility: CLINIC | Age: 28
End: 2018-03-20

## 2018-03-20 VITALS — DIASTOLIC BLOOD PRESSURE: 68 MMHG | WEIGHT: 219 LBS | BODY MASS INDEX: 35.36 KG/M2 | SYSTOLIC BLOOD PRESSURE: 122 MMHG

## 2018-03-20 DIAGNOSIS — Z34.93 THIRD TRIMESTER PREGNANCY: Primary | ICD-10-CM

## 2018-03-20 DIAGNOSIS — M54.31 SCIATICA OF RIGHT SIDE: ICD-10-CM

## 2018-03-20 PROCEDURE — 0502F SUBSEQUENT PRENATAL CARE: CPT | Performed by: OBSTETRICS & GYNECOLOGY

## 2018-03-20 NOTE — PROGRESS NOTES
Chief Complaint   Patient presents with   • Routine Prenatal Visit     No Complaints, Good Fetal Movement        HPI:   , 28w5d gestation reports doing well, Glucola within normal limits last time    ROS:  See Prenatal Episode/Flowsheet  /68   Wt 99.3 kg (219 lb)   LMP 2017 (Exact Date)   BMI 35.36 kg/m²      EXAM:  EXTREMITIES:  No swelling-See Prenatal Episode/Flowsheet    ABDOMEN:  FHTs/Movement noted-See Prenatal Episode/Flowsheet    URINE GLUCOSE/PROTEIN:  See Prenatal Episode/Flowsheet    PELVIC EXAM:  See Prenatal Episode/Flowsheet  CV:  Lungs:    MDM:    Lab Results   Component Value Date    HGB 12.8 2018    RUBELLAABIGG 1.16 2017    HEPBSAG Negative 2017    ABO O 2017    RH Positive 2017    ABSCRN Negative 2017    [id number] Non Reactive 2017       U/S:    1. IUP 28w5d  2. Routine care , growth ultrasound next time also will schedule repeat  and sign tubal papers.

## 2018-04-05 ENCOUNTER — ROUTINE PRENATAL (OUTPATIENT)
Dept: OBSTETRICS AND GYNECOLOGY | Facility: CLINIC | Age: 28
End: 2018-04-05

## 2018-04-05 ENCOUNTER — PREP FOR SURGERY (OUTPATIENT)
Dept: OTHER | Facility: HOSPITAL | Age: 28
End: 2018-04-05

## 2018-04-05 VITALS — SYSTOLIC BLOOD PRESSURE: 124 MMHG | WEIGHT: 223 LBS | BODY MASS INDEX: 36.01 KG/M2 | DIASTOLIC BLOOD PRESSURE: 70 MMHG

## 2018-04-05 DIAGNOSIS — Z3A.39 39 WEEKS GESTATION OF PREGNANCY: Primary | ICD-10-CM

## 2018-04-05 DIAGNOSIS — M54.31 SCIATICA OF RIGHT SIDE: ICD-10-CM

## 2018-04-05 PROCEDURE — 0502F SUBSEQUENT PRENATAL CARE: CPT | Performed by: NURSE PRACTITIONER

## 2018-04-05 RX ORDER — ZOLPIDEM TARTRATE 5 MG/1
10 TABLET ORAL NIGHTLY PRN
Status: CANCELLED | OUTPATIENT
Start: 2018-04-05 | End: 2018-04-15

## 2018-04-05 RX ORDER — MORPHINE SULFATE 0.5 MG/ML
6 INJECTION, SOLUTION EPIDURAL; INTRATHECAL; INTRAVENOUS
Status: CANCELLED | OUTPATIENT
Start: 2018-04-05 | End: 2018-04-15

## 2018-04-05 RX ORDER — ONDANSETRON 4 MG/1
4 TABLET, FILM COATED ORAL EVERY 6 HOURS PRN
Status: CANCELLED | OUTPATIENT
Start: 2018-04-05

## 2018-04-05 RX ORDER — PROMETHAZINE HYDROCHLORIDE 25 MG/ML
25 INJECTION, SOLUTION INTRAMUSCULAR; INTRAVENOUS 4 TIMES DAILY PRN
Status: CANCELLED | OUTPATIENT
Start: 2018-04-05

## 2018-04-05 RX ORDER — LIDOCAINE HYDROCHLORIDE 10 MG/ML
5 INJECTION, SOLUTION EPIDURAL; INFILTRATION; INTRACAUDAL; PERINEURAL AS NEEDED
Status: CANCELLED | OUTPATIENT
Start: 2018-04-05

## 2018-04-05 RX ORDER — PROMETHAZINE HYDROCHLORIDE 25 MG/ML
12.5 INJECTION, SOLUTION INTRAMUSCULAR; INTRAVENOUS EVERY 4 HOURS PRN
Status: CANCELLED | OUTPATIENT
Start: 2018-04-05

## 2018-04-05 RX ORDER — METHYLERGONOVINE MALEATE 0.2 MG/ML
200 INJECTION INTRAVENOUS ONCE AS NEEDED
Status: CANCELLED | OUTPATIENT
Start: 2018-04-05

## 2018-04-05 RX ORDER — ONDANSETRON 4 MG/1
4 TABLET, ORALLY DISINTEGRATING ORAL EVERY 6 HOURS PRN
Status: CANCELLED | OUTPATIENT
Start: 2018-04-05

## 2018-04-05 RX ORDER — MISOPROSTOL 200 UG/1
800 TABLET ORAL AS NEEDED
Status: CANCELLED | OUTPATIENT
Start: 2018-04-05

## 2018-04-05 RX ORDER — SODIUM CHLORIDE 0.9 % (FLUSH) 0.9 %
1-10 SYRINGE (ML) INJECTION AS NEEDED
Status: CANCELLED | OUTPATIENT
Start: 2018-04-05

## 2018-04-05 RX ORDER — TRISODIUM CITRATE DIHYDRATE AND CITRIC ACID MONOHYDRATE 500; 334 MG/5ML; MG/5ML
30 SOLUTION ORAL ONCE
Status: CANCELLED | OUTPATIENT
Start: 2018-04-05 | End: 2018-04-05

## 2018-04-05 RX ORDER — CARBOPROST TROMETHAMINE 250 UG/ML
250 INJECTION, SOLUTION INTRAMUSCULAR AS NEEDED
Status: CANCELLED | OUTPATIENT
Start: 2018-04-05

## 2018-04-05 RX ORDER — ONDANSETRON 2 MG/ML
4 INJECTION INTRAMUSCULAR; INTRAVENOUS EVERY 6 HOURS PRN
Status: CANCELLED | OUTPATIENT
Start: 2018-04-05

## 2018-04-05 RX ORDER — PROMETHAZINE HYDROCHLORIDE 12.5 MG/1
12.5 TABLET ORAL EVERY 6 HOURS PRN
Status: CANCELLED | OUTPATIENT
Start: 2018-04-05

## 2018-04-05 RX ORDER — PROMETHAZINE HYDROCHLORIDE 12.5 MG/1
12.5 SUPPOSITORY RECTAL EVERY 6 HOURS PRN
Status: CANCELLED | OUTPATIENT
Start: 2018-04-05

## 2018-04-05 NOTE — PROGRESS NOTES
Chief Complaint   Patient presents with   • Routine Prenatal Visit     Growth Scan, No Complaints,Good Fetal Movement        HPI  , 31w0d reports wants to schedule C/S and BTL date.  Doing well - no c/o     ROS  /70   Wt 101 kg (223 lb)   LMP 2017 (Exact Date)   BMI 36.01 kg/m²  -See Prenatal Assessment    ROS:  GI: Nausea - No; Constipation - No; Diarrhea - No    Neuro: Headache - No; Visual change - No      EXAM  General Appearance:  Lungs: Breathing unlabored  Abdomen:  See flow sheet for Fundal ht, FM, FHT's  LE: Neg edema    MDM  Impression:  Problems/Risks: Normal Pregnancy  Previous C/S   desires BTL    Tests done today: U/S  & rev'd results    Topics discussed: continue to note good FM  T-dap   Sign BTL papers today and schedule c/s date as well  encouraged questions - call prn    Tests next visit: none     OB History      Para Term  AB Living    3 2 2   2    SAB TAB Ectopic Molar Multiple Live Births         2          Past Medical History:   Diagnosis Date   • Patient denies medical problems        Past Surgical History:   Procedure Laterality Date   •  SECTION  ,     x's 2   • KNEE ACL RECONSTRUCTION         Family History   Problem Relation Age of Onset   • Prostate cancer Paternal Grandfather    • Cancer Paternal Grandmother    • Lung cancer Maternal Grandmother    • Prostate cancer Maternal Grandfather        Social History     Social History   • Marital status:      Spouse name: N/A   • Number of children: N/A   • Years of education: N/A     Occupational History   • Not on file.     Social History Main Topics   • Smoking status: Never Smoker   • Smokeless tobacco: Never Used   • Alcohol use No   • Drug use: No   • Sexual activity: Yes     Other Topics Concern   • Not on file     Social History Narrative   • No narrative on file

## 2018-04-23 ENCOUNTER — ROUTINE PRENATAL (OUTPATIENT)
Dept: OBSTETRICS AND GYNECOLOGY | Facility: CLINIC | Age: 28
End: 2018-04-23

## 2018-04-23 VITALS — BODY MASS INDEX: 36.17 KG/M2 | SYSTOLIC BLOOD PRESSURE: 126 MMHG | DIASTOLIC BLOOD PRESSURE: 70 MMHG | WEIGHT: 224 LBS

## 2018-04-23 DIAGNOSIS — Z34.83 ENCOUNTER FOR SUPERVISION OF OTHER NORMAL PREGNANCY IN THIRD TRIMESTER: Primary | ICD-10-CM

## 2018-04-23 DIAGNOSIS — M54.31 SCIATICA OF RIGHT SIDE: ICD-10-CM

## 2018-04-23 PROBLEM — Z3A.39 39 WEEKS GESTATION OF PREGNANCY: Status: RESOLVED | Noted: 2018-04-05 | Resolved: 2018-04-23

## 2018-04-23 PROCEDURE — 0502F SUBSEQUENT PRENATAL CARE: CPT | Performed by: MIDWIFE

## 2018-04-23 RX ORDER — VITAMIN A ACETATE, .BETA.-CAROTENE, ASCORBIC ACID, CHOLECALCIFEROL, .ALPHA.-TOCOPHEROL ACETATE, DL-, THIAMINE MONONITRATE, RIBOFLAVIN, NIACINAMIDE, PYRIDOXINE HYDROCHLORIDE, FOLIC ACID, CYANOCOBALAMIN, CALCIUM CARBONATE, FERROUS FUMARATE, ZINC OXIDE, AND CUPRIC OXIDE 2000; 2000; 120; 400; 22; 1.84; 3; 20; 10; 1; 12; 200; 27; 25; 2 [IU]/1; [IU]/1; MG/1; [IU]/1; MG/1; MG/1; MG/1; MG/1; MG/1; MG/1; UG/1; MG/1; MG/1; MG/1; MG/1
1 TABLET ORAL DAILY
Refills: 3 | COMMUNITY
Start: 2018-04-16 | End: 2020-08-25 | Stop reason: SDUPTHER

## 2018-04-23 NOTE — PROGRESS NOTES
Chief Complaint   Patient presents with   • Routine Prenatal Visit     PAIN IN LOWER LEFT RIB AREA, VOMITING.        HPI: Nelsy is a  currently at 33w4d who today reports the following:  Contractions - No; Leaking - No; Vaginal bleeding -  No; Swelling of extremities - No.  For the past week, she has been having a pain LUQ abdomen underneath ribs. She does vomit after having this pain. She doesn't think it is associated with eating or certain foods. Pain goes away spontaneously and she doesn't take Tylenol.    ROS:   GI:   Nausea - YES; Constipation - No   Neuro:  Headache - No; Visual disturbances - No.    EXAM:   Vitals:  See prenatal flowsheet, /70, Wt +1#   Abdomen:   See prenatal flowsheet, soft, nontender   Pelvic:  See prenatal flowsheet   Urine:  See prenatal flowsheet    MDM:  Impression: Supervision of low risk pregnancy  Previous C/S with planned repeat C/S   Tests done today: none   Topics discussed: kick counts and fetal movement   labor signs and symptoms   Tests next visit: GBS testing   Next visit: 2 weeks     This note was electronically signed.  Bailey Larry, APRN  2018

## 2018-05-07 ENCOUNTER — ROUTINE PRENATAL (OUTPATIENT)
Dept: OBSTETRICS AND GYNECOLOGY | Facility: CLINIC | Age: 28
End: 2018-05-07

## 2018-05-07 VITALS — DIASTOLIC BLOOD PRESSURE: 66 MMHG | WEIGHT: 229 LBS | SYSTOLIC BLOOD PRESSURE: 132 MMHG | BODY MASS INDEX: 36.98 KG/M2

## 2018-05-07 DIAGNOSIS — Z34.83 ENCOUNTER FOR SUPERVISION OF OTHER NORMAL PREGNANCY IN THIRD TRIMESTER: ICD-10-CM

## 2018-05-07 DIAGNOSIS — Z98.891 HX OF CESAREAN SECTION: ICD-10-CM

## 2018-05-07 DIAGNOSIS — Z36.85 ANTENATAL SCREENING FOR STREPTOCOCCUS B: Primary | ICD-10-CM

## 2018-05-07 PROCEDURE — 0502F SUBSEQUENT PRENATAL CARE: CPT | Performed by: OBSTETRICS & GYNECOLOGY

## 2018-05-07 NOTE — PROGRESS NOTES
Chief Complaint   Patient presents with   • Routine Prenatal Visit     GBS DONE TODAY       HPI: Nelsy is a  currently at 35w4d who today reports the following:  Contractions - YES - but less than 4/hour AND no associated change in vaginal discharge; Leaking - No; Vaginal bleeding -  No; Heartburn - No.  Pt with increasing pressure.  Pt with occ contractions.  Pt denies any leaking of fluid.    ROS:   GI:   Nausea - No; Constipation - No; Diarrhea - No.   Neuro:  Headache - No; Visual disturbances - No.    EXAM:   Vitals:  See prenatal flowsheet as noted and reviewed   Abdomen:   See prenatal flowsheet as noted and reviewed   Pelvic:  See prenatal flowsheet as noted and reviewed   Urine:  See prenatal flowsheet as noted and reviewed     Lab Results   Component Value Date    ABO O 2017    RH Positive 2017    ABSCRN Negative 2017       MDM:  Impression: Supervision of low risk pregnancy   Tests done today: GBS testing   Topics discussed: kick counts and fetal movement  PIH precautions   labor signs and symptoms   Tests next visit: none     This note was electronically signed.  Swetha Hernandez M.D.

## 2018-05-11 LAB
CLINDAMYCIN ISLT KB: NORMAL
GP B STREP DNA SPEC QL NAA+PROBE: POSITIVE
ORGANISM ID: NORMAL

## 2018-05-14 ENCOUNTER — HOSPITAL ENCOUNTER (OUTPATIENT)
Facility: HOSPITAL | Age: 28
Discharge: HOME | End: 2018-05-14
Attending: MIDWIFE | Admitting: MIDWIFE

## 2018-05-14 ENCOUNTER — ROUTINE PRENATAL (OUTPATIENT)
Dept: OBSTETRICS AND GYNECOLOGY | Facility: CLINIC | Age: 28
End: 2018-05-14

## 2018-05-14 VITALS — BODY MASS INDEX: 36.82 KG/M2 | DIASTOLIC BLOOD PRESSURE: 82 MMHG | WEIGHT: 228 LBS | SYSTOLIC BLOOD PRESSURE: 154 MMHG

## 2018-05-14 VITALS — SYSTOLIC BLOOD PRESSURE: 123 MMHG | HEART RATE: 83 BPM | OXYGEN SATURATION: 97 % | DIASTOLIC BLOOD PRESSURE: 61 MMHG

## 2018-05-14 DIAGNOSIS — Z98.891 HX OF CESAREAN SECTION: ICD-10-CM

## 2018-05-14 DIAGNOSIS — M54.31 SCIATICA OF RIGHT SIDE: ICD-10-CM

## 2018-05-14 DIAGNOSIS — Z34.83 ENCOUNTER FOR SUPERVISION OF OTHER NORMAL PREGNANCY IN THIRD TRIMESTER: Primary | ICD-10-CM

## 2018-05-14 PROBLEM — B95.1 POSITIVE TESTING FOR GROUP B STREPTOCOCCUS: Status: ACTIVE | Noted: 2018-05-07

## 2018-05-14 PROCEDURE — G0463 HOSPITAL OUTPT CLINIC VISIT: HCPCS

## 2018-05-14 PROCEDURE — 59025 FETAL NON-STRESS TEST: CPT

## 2018-05-14 PROCEDURE — 59025 FETAL NON-STRESS TEST: CPT | Performed by: MIDWIFE

## 2018-05-14 PROCEDURE — 0502F SUBSEQUENT PRENATAL CARE: CPT | Performed by: MIDWIFE

## 2018-05-14 NOTE — NON STRESS TEST
Nelsy Bill, a  at 36w4d with an LEDA of 2018, by Ultrasound, was seen at [facility] for a nonstress test.    Chief Complaint   Patient presents with   • Non-stress Test     ELEVATED BLOOD PRESSURE IN OFFICE       Interpretation A  Nonstress Test Interpretation A: Reactive (18 1445 : Dorcas Arellano RN)

## 2018-05-14 NOTE — PROGRESS NOTES
Chief Complaint   Patient presents with   • Routine Prenatal Visit   • Edema       HPI: Nelsy is a  currently at 36w4d who today reports the following:  Contractions - No; Leaking - No; Vaginal bleeding -  No; Swelling of extremities - YES.  Denies any symptoms of preeclampsia. Baby is active. She is currently moving into a new house.  ROS:   GI:   Nausea - No; Constipation - No   Neuro:  Headache - No; Visual disturbances - No.    EXAM:   Vitals:  See prenatal flowsheet, /82, Wt -1#   Abdomen:   See prenatal flowsheet, soft, nontender   Pelvic:  See prenatal flowsheet   Urine:  See prenatal flowsheet    MDM:  Impression: Supervision of low risk pregnancy  Previous C/S with planned repeat C/S  Hx of preeclampsia 1st pregnancy   Tests done today: none   Topics discussed: kick counts and fetal movement  labor signs and symptoms  PIH precautions  To  for serial BPs and NST   Tests next visit: none   Next visit: 1 weeks     This note was electronically signed.  Bailey Larry, JULIANNE  2018

## 2018-05-21 ENCOUNTER — ROUTINE PRENATAL (OUTPATIENT)
Dept: OBSTETRICS AND GYNECOLOGY | Facility: CLINIC | Age: 28
End: 2018-05-21

## 2018-05-21 VITALS — BODY MASS INDEX: 37.14 KG/M2 | WEIGHT: 230 LBS | DIASTOLIC BLOOD PRESSURE: 74 MMHG | SYSTOLIC BLOOD PRESSURE: 140 MMHG

## 2018-05-21 DIAGNOSIS — Z34.93 NORMAL PREGNANCY, THIRD TRIMESTER: Primary | ICD-10-CM

## 2018-05-21 DIAGNOSIS — M54.31 SCIATICA OF RIGHT SIDE: ICD-10-CM

## 2018-05-21 PROCEDURE — 0502F SUBSEQUENT PRENATAL CARE: CPT | Performed by: NURSE PRACTITIONER

## 2018-05-21 NOTE — PROGRESS NOTES
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  Good FM     ROS  /74   Wt 104 kg (230 lb)   LMP 2017 (Exact Date)   BMI 37.14 kg/m²  -See Prenatal Assessment    ROS:  GI: Nausea - No; Constipation - No; Diarrhea - No    Neuro: Headache - No; Visual change - No      EXAM  BP hemanth 128/76  General Appearance:  Lungs: Breathing unlabored  Abdomen:  See flow sheet for Fundal ht, FM, FHT's  LE: minimal pretibial edema  V/E declined     MDM  Impression:  Problems/Risks: Normal Pregnancy  Previous C/S - plans rept  Hx Preeclamsia with G1   Tests done today: none   Topics discussed: S/S labor and adeq FM/Kick Counts  Preventive measures of LE edema  PIH precautions  option to start maternity leave   encouraged questions - call prn    Tests next visit: none     OB History      Para Term  AB Living    3 2 2     2    SAB TAB Ectopic Molar Multiple Live Births              2          Past Medical History:   Diagnosis Date   • Patient denies medical problems    • Positive testing for group B Streptococcus 2018   • Preeclampsia    • Varicella        Past Surgical History:   Procedure Laterality Date   •  SECTION  ,     x's 2   • KNEE ACL RECONSTRUCTION     • WISDOM TOOTH EXTRACTION         Family History   Problem Relation Age of Onset   • Prostate cancer Paternal Grandfather    • Cancer Paternal Grandmother    • Lung cancer Maternal Grandmother    • Prostate cancer Maternal Grandfather        Social History     Social History   • Marital status:      Spouse name: N/A   • Number of children: N/A   • Years of education: N/A     Occupational History   • Not on file.     Social History Main Topics   •  Smoking status: Never Smoker   • Smokeless tobacco: Never Used   • Alcohol use No   • Drug use: No   • Sexual activity: Yes     Partners: Male     Birth control/ protection: None     Other Topics Concern   • Not on file     Social History Narrative   • No narrative on file

## 2018-05-29 ENCOUNTER — ROUTINE PRENATAL (OUTPATIENT)
Dept: OBSTETRICS AND GYNECOLOGY | Facility: CLINIC | Age: 28
End: 2018-05-29

## 2018-05-29 VITALS — BODY MASS INDEX: 37.3 KG/M2 | SYSTOLIC BLOOD PRESSURE: 124 MMHG | WEIGHT: 231 LBS | DIASTOLIC BLOOD PRESSURE: 70 MMHG

## 2018-05-29 DIAGNOSIS — M54.31 SCIATICA OF RIGHT SIDE: ICD-10-CM

## 2018-05-29 DIAGNOSIS — Z34.83 ENCOUNTER FOR SUPERVISION OF OTHER NORMAL PREGNANCY IN THIRD TRIMESTER: Primary | ICD-10-CM

## 2018-05-29 PROCEDURE — 0502F SUBSEQUENT PRENATAL CARE: CPT | Performed by: MIDWIFE

## 2018-05-29 NOTE — PROGRESS NOTES
Chief Complaint   Patient presents with   • Routine Prenatal Visit     No Complaints, Good Fetal Movement, C Section Scheduled this Friday       HPI: Nelsy is a  currently at 38w5d who today reports the following:  Contractions - No; Leaking - No; Vaginal bleeding -  No; Swelling of extremities - No.  Baby is active. C/S scheduled for     ROS:   GI:   Nausea - No; Constipation - No   Neuro:  Headache - No; Visual disturbances - No.    EXAM:   Vitals:  See prenatal flowsheet, /70, Wt +1#   Abdomen:   See prenatal flowsheet, soft, nontender   Pelvic:  See prenatal flowsheet   Urine:  See prenatal flowsheet    MDM:  Impression: Supervision of low risk pregnancy  Previous C/S with planned repeat C/S   Tests done today: none   Topics discussed: kick counts and fetal movement  labor signs and symptoms  Repeat C/S   Tests next visit: none   Next visit: C/S      This note was electronically signed.  Bailey Larry, JULIANNE  2018

## 2018-05-30 ENCOUNTER — LAB (OUTPATIENT)
Dept: LAB | Facility: HOSPITAL | Age: 28
End: 2018-05-30
Attending: NURSE PRACTITIONER

## 2018-05-30 DIAGNOSIS — Z3A.39 39 WEEKS GESTATION OF PREGNANCY: ICD-10-CM

## 2018-05-30 LAB
ABO GROUP BLD: NORMAL
AMORPH URATE CRY URNS QL MICRO: ABNORMAL /HPF
BACTERIA UR QL AUTO: ABNORMAL /HPF
BASOPHILS # BLD AUTO: 0.02 10*3/MM3 (ref 0–0.2)
BASOPHILS NFR BLD AUTO: 0.2 % (ref 0–2.5)
BILIRUB UR QL STRIP: NEGATIVE
BLD GP AB SCN SERPL QL: NEGATIVE
CLARITY UR: ABNORMAL
COLOR UR: YELLOW
DEPRECATED RDW RBC AUTO: 49.5 FL (ref 37–54)
EOSINOPHIL # BLD AUTO: 0.11 10*3/MM3 (ref 0–0.7)
EOSINOPHIL NFR BLD AUTO: 1.3 % (ref 0–7)
ERYTHROCYTE [DISTWIDTH] IN BLOOD BY AUTOMATED COUNT: 14.6 % (ref 11.5–14.5)
GLUCOSE UR STRIP-MCNC: NEGATIVE MG/DL
HCT VFR BLD AUTO: 39.9 % (ref 37–47)
HGB BLD-MCNC: 13.5 G/DL (ref 12–16)
HGB UR QL STRIP.AUTO: ABNORMAL
HYALINE CASTS UR QL AUTO: ABNORMAL /LPF
IMM GRANULOCYTES # BLD: 0.05 10*3/MM3 (ref 0–0.06)
IMM GRANULOCYTES NFR BLD: 0.6 % (ref 0–0.6)
KETONES UR QL STRIP: NEGATIVE
LEUKOCYTE ESTERASE UR QL STRIP.AUTO: ABNORMAL
LYMPHOCYTES # BLD AUTO: 1.29 10*3/MM3 (ref 0.6–3.4)
LYMPHOCYTES NFR BLD AUTO: 14.9 % (ref 10–50)
MCH RBC QN AUTO: 31.3 PG (ref 27–31)
MCHC RBC AUTO-ENTMCNC: 33.8 G/DL (ref 30–37)
MCV RBC AUTO: 92.4 FL (ref 81–99)
MONOCYTES # BLD AUTO: 0.57 10*3/MM3 (ref 0–0.9)
MONOCYTES NFR BLD AUTO: 6.6 % (ref 0–12)
NEUTROPHILS # BLD AUTO: 6.63 10*3/MM3 (ref 2–6.9)
NEUTROPHILS NFR BLD AUTO: 76.4 % (ref 37–80)
NITRITE UR QL STRIP: NEGATIVE
NRBC BLD MANUAL-RTO: 0 /100 WBC (ref 0–0)
PH UR STRIP.AUTO: 7 [PH] (ref 5–8)
PLATELET # BLD AUTO: 203 10*3/MM3 (ref 130–400)
PMV BLD AUTO: 9.7 FL (ref 6–12)
PROT UR QL STRIP: NEGATIVE
RBC # BLD AUTO: 4.32 10*6/MM3 (ref 4.2–5.4)
RBC # UR: ABNORMAL /HPF
REF LAB TEST METHOD: ABNORMAL
RH BLD: POSITIVE
SP GR UR STRIP: 1.01 (ref 1–1.03)
SQUAMOUS #/AREA URNS HPF: ABNORMAL /HPF
T&S EXPIRATION DATE: NORMAL
UROBILINOGEN UR QL STRIP: ABNORMAL
WBC NRBC COR # BLD: 8.67 10*3/MM3 (ref 4.8–10.8)
WBC UR QL AUTO: ABNORMAL /HPF

## 2018-05-30 PROCEDURE — 36415 COLL VENOUS BLD VENIPUNCTURE: CPT

## 2018-05-30 PROCEDURE — 81001 URINALYSIS AUTO W/SCOPE: CPT

## 2018-05-30 PROCEDURE — S0260 H&P FOR SURGERY: HCPCS | Performed by: OBSTETRICS & GYNECOLOGY

## 2018-05-30 PROCEDURE — 86901 BLOOD TYPING SEROLOGIC RH(D): CPT

## 2018-05-30 PROCEDURE — 85025 COMPLETE CBC W/AUTO DIFF WBC: CPT

## 2018-05-30 PROCEDURE — 86850 RBC ANTIBODY SCREEN: CPT

## 2018-05-30 PROCEDURE — 86900 BLOOD TYPING SEROLOGIC ABO: CPT

## 2018-05-30 PROCEDURE — 87086 URINE CULTURE/COLONY COUNT: CPT

## 2018-06-01 ENCOUNTER — ANESTHESIA EVENT (OUTPATIENT)
Dept: LABOR AND DELIVERY | Facility: HOSPITAL | Age: 28
End: 2018-06-01

## 2018-06-01 ENCOUNTER — HOSPITAL ENCOUNTER (INPATIENT)
Facility: HOSPITAL | Age: 28
LOS: 3 days | Discharge: HOME | End: 2018-06-04
Attending: OBSTETRICS & GYNECOLOGY | Admitting: OBSTETRICS & GYNECOLOGY

## 2018-06-01 ENCOUNTER — ANESTHESIA (OUTPATIENT)
Dept: LABOR AND DELIVERY | Facility: HOSPITAL | Age: 28
End: 2018-06-01

## 2018-06-01 DIAGNOSIS — Z3A.39 39 WEEKS GESTATION OF PREGNANCY: ICD-10-CM

## 2018-06-01 PROBLEM — Z34.90 PREGNANCY: Status: ACTIVE | Noted: 2018-06-01

## 2018-06-01 LAB — BACTERIA SPEC AEROBE CULT: NORMAL

## 2018-06-01 PROCEDURE — 25010000002 HYDROMORPHONE PER 4 MG: Performed by: NURSE ANESTHETIST, CERTIFIED REGISTERED

## 2018-06-01 PROCEDURE — 25010000002 MORPHINE PER 10 MG: Performed by: NURSE ANESTHETIST, CERTIFIED REGISTERED

## 2018-06-01 PROCEDURE — 59025 FETAL NON-STRESS TEST: CPT

## 2018-06-01 PROCEDURE — 51703 INSERT BLADDER CATH COMPLEX: CPT

## 2018-06-01 PROCEDURE — 25010000002 MORPHINE PER 10 MG: Performed by: MIDWIFE

## 2018-06-01 PROCEDURE — 25010000002 NALBUPHINE PER 10 MG

## 2018-06-01 PROCEDURE — 25010000002 PHENYLEPHRINE PER 1 ML: Performed by: NURSE ANESTHETIST, CERTIFIED REGISTERED

## 2018-06-01 PROCEDURE — 25810000003 DEXTROSE-NACL PER 500 ML: Performed by: OBSTETRICS & GYNECOLOGY

## 2018-06-01 PROCEDURE — 59510 CESAREAN DELIVERY: CPT | Performed by: OBSTETRICS & GYNECOLOGY

## 2018-06-01 PROCEDURE — 59025 FETAL NON-STRESS TEST: CPT | Performed by: NURSE PRACTITIONER

## 2018-06-01 PROCEDURE — 25010000002 NALBUPHINE PER 10 MG: Performed by: NURSE ANESTHETIST, CERTIFIED REGISTERED

## 2018-06-01 RX ORDER — PROMETHAZINE HYDROCHLORIDE 12.5 MG/1
12.5 SUPPOSITORY RECTAL EVERY 6 HOURS PRN
Status: DISCONTINUED | OUTPATIENT
Start: 2018-06-01 | End: 2018-06-01 | Stop reason: HOSPADM

## 2018-06-01 RX ORDER — ONDANSETRON 2 MG/ML
4 INJECTION INTRAMUSCULAR; INTRAVENOUS EVERY 6 HOURS PRN
Status: DISCONTINUED | OUTPATIENT
Start: 2018-06-01 | End: 2018-06-04 | Stop reason: HOSPADM

## 2018-06-01 RX ORDER — PROMETHAZINE HYDROCHLORIDE 25 MG/ML
6.25 INJECTION, SOLUTION INTRAMUSCULAR; INTRAVENOUS ONCE AS NEEDED
Status: DISCONTINUED | OUTPATIENT
Start: 2018-06-01 | End: 2018-06-04 | Stop reason: HOSPADM

## 2018-06-01 RX ORDER — MORPHINE SULFATE 2 MG/ML
2 INJECTION, SOLUTION INTRAMUSCULAR; INTRAVENOUS EVERY 4 HOURS PRN
Status: DISCONTINUED | OUTPATIENT
Start: 2018-06-01 | End: 2018-06-01

## 2018-06-01 RX ORDER — PROMETHAZINE HYDROCHLORIDE 25 MG/1
25 TABLET ORAL ONCE AS NEEDED
Status: DISCONTINUED | OUTPATIENT
Start: 2018-06-01 | End: 2018-06-04 | Stop reason: HOSPADM

## 2018-06-01 RX ORDER — MISOPROSTOL 200 UG/1
800 TABLET ORAL AS NEEDED
Status: DISCONTINUED | OUTPATIENT
Start: 2018-06-01 | End: 2018-06-04 | Stop reason: HOSPADM

## 2018-06-01 RX ORDER — ZOLPIDEM TARTRATE 5 MG/1
10 TABLET ORAL NIGHTLY PRN
Status: DISCONTINUED | OUTPATIENT
Start: 2018-06-01 | End: 2018-06-01 | Stop reason: SDUPTHER

## 2018-06-01 RX ORDER — NALOXONE HCL 0.4 MG/ML
0.4 VIAL (ML) INJECTION
Status: DISCONTINUED | OUTPATIENT
Start: 2018-06-01 | End: 2018-06-04 | Stop reason: HOSPADM

## 2018-06-01 RX ORDER — NALBUPHINE HCL 10 MG/ML
2.5 AMPUL (ML) INJECTION EVERY 4 HOURS PRN
Status: ACTIVE | OUTPATIENT
Start: 2018-06-01 | End: 2018-06-02

## 2018-06-01 RX ORDER — LIDOCAINE HYDROCHLORIDE 10 MG/ML
5 INJECTION, SOLUTION EPIDURAL; INFILTRATION; INTRACAUDAL; PERINEURAL AS NEEDED
Status: DISCONTINUED | OUTPATIENT
Start: 2018-06-01 | End: 2018-06-01 | Stop reason: HOSPADM

## 2018-06-01 RX ORDER — TRISODIUM CITRATE DIHYDRATE AND CITRIC ACID MONOHYDRATE 500; 334 MG/5ML; MG/5ML
30 SOLUTION ORAL ONCE
Status: COMPLETED | OUTPATIENT
Start: 2018-06-01 | End: 2018-06-01

## 2018-06-01 RX ORDER — ONDANSETRON 4 MG/1
4 TABLET, FILM COATED ORAL EVERY 8 HOURS PRN
Status: DISCONTINUED | OUTPATIENT
Start: 2018-06-01 | End: 2018-06-04 | Stop reason: HOSPADM

## 2018-06-01 RX ORDER — DOCUSATE SODIUM 100 MG/1
100 CAPSULE, LIQUID FILLED ORAL 2 TIMES DAILY PRN
Status: DISCONTINUED | OUTPATIENT
Start: 2018-06-01 | End: 2018-06-04 | Stop reason: HOSPADM

## 2018-06-01 RX ORDER — BISACODYL 10 MG
10 SUPPOSITORY, RECTAL RECTAL DAILY PRN
Status: DISCONTINUED | OUTPATIENT
Start: 2018-06-01 | End: 2018-06-04 | Stop reason: HOSPADM

## 2018-06-01 RX ORDER — NALBUPHINE HCL 10 MG/ML
2 AMPUL (ML) INJECTION EVERY 4 HOURS PRN
Status: COMPLETED | OUTPATIENT
Start: 2018-06-01 | End: 2018-06-01

## 2018-06-01 RX ORDER — PROMETHAZINE HYDROCHLORIDE 25 MG/ML
12.5 INJECTION, SOLUTION INTRAMUSCULAR; INTRAVENOUS EVERY 6 HOURS PRN
Status: DISCONTINUED | OUTPATIENT
Start: 2018-06-01 | End: 2018-06-04 | Stop reason: HOSPADM

## 2018-06-01 RX ORDER — OXYCODONE HYDROCHLORIDE AND ACETAMINOPHEN 5; 325 MG/1; MG/1
2 TABLET ORAL EVERY 4 HOURS PRN
Status: DISCONTINUED | OUTPATIENT
Start: 2018-06-02 | End: 2018-06-04 | Stop reason: HOSPADM

## 2018-06-01 RX ORDER — BISACODYL 5 MG/1
10 TABLET, DELAYED RELEASE ORAL DAILY PRN
Status: DISCONTINUED | OUTPATIENT
Start: 2018-06-01 | End: 2018-06-04 | Stop reason: HOSPADM

## 2018-06-01 RX ORDER — NALOXONE HCL 0.4 MG/ML
0.4 VIAL (ML) INJECTION
Status: ACTIVE | OUTPATIENT
Start: 2018-06-01 | End: 2018-06-02

## 2018-06-01 RX ORDER — PROMETHAZINE HYDROCHLORIDE 25 MG/ML
12.5 INJECTION, SOLUTION INTRAMUSCULAR; INTRAVENOUS EVERY 4 HOURS PRN
Status: DISCONTINUED | OUTPATIENT
Start: 2018-06-01 | End: 2018-06-01 | Stop reason: HOSPADM

## 2018-06-01 RX ORDER — CARBOPROST TROMETHAMINE 250 UG/ML
250 INJECTION, SOLUTION INTRAMUSCULAR AS NEEDED
Status: DISCONTINUED | OUTPATIENT
Start: 2018-06-01 | End: 2018-06-04 | Stop reason: HOSPADM

## 2018-06-01 RX ORDER — ONDANSETRON 4 MG/1
4 TABLET, ORALLY DISINTEGRATING ORAL EVERY 6 HOURS PRN
Status: DISCONTINUED | OUTPATIENT
Start: 2018-06-01 | End: 2018-06-04 | Stop reason: HOSPADM

## 2018-06-01 RX ORDER — BUPIVACAINE HYDROCHLORIDE 7.5 MG/ML
INJECTION, SOLUTION INTRASPINAL AS NEEDED
Status: DISCONTINUED | OUTPATIENT
Start: 2018-06-01 | End: 2018-06-01 | Stop reason: SURG

## 2018-06-01 RX ORDER — PROMETHAZINE HYDROCHLORIDE 25 MG/ML
25 INJECTION, SOLUTION INTRAMUSCULAR; INTRAVENOUS 4 TIMES DAILY PRN
Status: DISCONTINUED | OUTPATIENT
Start: 2018-06-01 | End: 2018-06-01 | Stop reason: HOSPADM

## 2018-06-01 RX ORDER — NALBUPHINE HCL 10 MG/ML
2 AMPUL (ML) INJECTION EVERY 4 HOURS PRN
Status: DISCONTINUED | OUTPATIENT
Start: 2018-06-01 | End: 2018-06-01

## 2018-06-01 RX ORDER — NALBUPHINE HCL 10 MG/ML
10 AMPUL (ML) INJECTION EVERY 4 HOURS PRN
Status: DISCONTINUED | OUTPATIENT
Start: 2018-06-01 | End: 2018-06-01

## 2018-06-01 RX ORDER — ONDANSETRON 2 MG/ML
4 INJECTION INTRAMUSCULAR; INTRAVENOUS ONCE AS NEEDED
Status: ACTIVE | OUTPATIENT
Start: 2018-06-01 | End: 2018-06-02

## 2018-06-01 RX ORDER — ALBUTEROL SULFATE 2.5 MG/3ML
2.5 SOLUTION RESPIRATORY (INHALATION) ONCE AS NEEDED
Status: DISCONTINUED | OUTPATIENT
Start: 2018-06-01 | End: 2018-06-04 | Stop reason: HOSPADM

## 2018-06-01 RX ORDER — MORPHINE SULFATE 2 MG/ML
6 INJECTION, SOLUTION INTRAMUSCULAR; INTRAVENOUS
Status: DISCONTINUED | OUTPATIENT
Start: 2018-06-01 | End: 2018-06-01

## 2018-06-01 RX ORDER — SODIUM CHLORIDE, SODIUM LACTATE, POTASSIUM CHLORIDE, CALCIUM CHLORIDE 600; 310; 30; 20 MG/100ML; MG/100ML; MG/100ML; MG/100ML
INJECTION, SOLUTION INTRAVENOUS CONTINUOUS PRN
Status: DISCONTINUED | OUTPATIENT
Start: 2018-06-01 | End: 2018-06-01 | Stop reason: SURG

## 2018-06-01 RX ORDER — SIMETHICONE 80 MG
80 TABLET,CHEWABLE ORAL 4 TIMES DAILY PRN
Status: DISCONTINUED | OUTPATIENT
Start: 2018-06-01 | End: 2018-06-04 | Stop reason: HOSPADM

## 2018-06-01 RX ORDER — DEXTROSE AND SODIUM CHLORIDE 5; .9 G/100ML; G/100ML
125 INJECTION, SOLUTION INTRAVENOUS CONTINUOUS
Status: DISCONTINUED | OUTPATIENT
Start: 2018-06-01 | End: 2018-06-04 | Stop reason: HOSPADM

## 2018-06-01 RX ORDER — PROMETHAZINE HYDROCHLORIDE 25 MG/1
25 SUPPOSITORY RECTAL ONCE AS NEEDED
Status: DISCONTINUED | OUTPATIENT
Start: 2018-06-01 | End: 2018-06-04 | Stop reason: HOSPADM

## 2018-06-01 RX ORDER — DIPHENHYDRAMINE HCL 25 MG
25 CAPSULE ORAL EVERY 4 HOURS PRN
Status: DISCONTINUED | OUTPATIENT
Start: 2018-06-01 | End: 2018-06-04 | Stop reason: HOSPADM

## 2018-06-01 RX ORDER — PROMETHAZINE HYDROCHLORIDE 12.5 MG/1
12.5 SUPPOSITORY RECTAL EVERY 6 HOURS PRN
Status: DISCONTINUED | OUTPATIENT
Start: 2018-06-01 | End: 2018-06-04 | Stop reason: HOSPADM

## 2018-06-01 RX ORDER — OXYTOCIN 10 [USP'U]/ML
INJECTION, SOLUTION INTRAMUSCULAR; INTRAVENOUS AS NEEDED
Status: DISCONTINUED | OUTPATIENT
Start: 2018-06-01 | End: 2018-06-01 | Stop reason: SURG

## 2018-06-01 RX ORDER — MORPHINE SULFATE 2 MG/ML
2 INJECTION, SOLUTION INTRAMUSCULAR; INTRAVENOUS EVERY 4 HOURS PRN
Status: DISPENSED | OUTPATIENT
Start: 2018-06-01 | End: 2018-06-02

## 2018-06-01 RX ORDER — ONDANSETRON 2 MG/ML
4 INJECTION INTRAMUSCULAR; INTRAVENOUS ONCE AS NEEDED
Status: DISCONTINUED | OUTPATIENT
Start: 2018-06-01 | End: 2018-06-04 | Stop reason: HOSPADM

## 2018-06-01 RX ORDER — IBUPROFEN 600 MG/1
600 TABLET ORAL EVERY 6 HOURS PRN
Status: DISCONTINUED | OUTPATIENT
Start: 2018-06-02 | End: 2018-06-04 | Stop reason: HOSPADM

## 2018-06-01 RX ORDER — WHITE PETROLATUM 450 MG/G
1 STICK TOPICAL CONTINUOUS PRN
Status: DISCONTINUED | OUTPATIENT
Start: 2018-06-01 | End: 2018-06-04 | Stop reason: HOSPADM

## 2018-06-01 RX ORDER — NALOXONE HCL 0.4 MG/ML
0.4 VIAL (ML) INJECTION AS NEEDED
Status: DISCONTINUED | OUTPATIENT
Start: 2018-06-01 | End: 2018-06-04 | Stop reason: HOSPADM

## 2018-06-01 RX ORDER — ZOLPIDEM TARTRATE 5 MG/1
10 TABLET ORAL NIGHTLY PRN
Status: DISCONTINUED | OUTPATIENT
Start: 2018-06-01 | End: 2018-06-04 | Stop reason: HOSPADM

## 2018-06-01 RX ORDER — PROMETHAZINE HYDROCHLORIDE 12.5 MG/1
12.5 TABLET ORAL EVERY 6 HOURS PRN
Status: DISCONTINUED | OUTPATIENT
Start: 2018-06-01 | End: 2018-06-01 | Stop reason: HOSPADM

## 2018-06-01 RX ORDER — NALBUPHINE HCL 10 MG/ML
AMPUL (ML) INJECTION
Status: COMPLETED
Start: 2018-06-01 | End: 2018-06-01

## 2018-06-01 RX ORDER — DIPHENHYDRAMINE HYDROCHLORIDE 50 MG/ML
12.5 INJECTION INTRAMUSCULAR; INTRAVENOUS
Status: DISCONTINUED | OUTPATIENT
Start: 2018-06-01 | End: 2018-06-04 | Stop reason: HOSPADM

## 2018-06-01 RX ORDER — MORPHINE SULFATE 1 MG/ML
INJECTION, SOLUTION EPIDURAL; INTRATHECAL; INTRAVENOUS AS NEEDED
Status: DISCONTINUED | OUTPATIENT
Start: 2018-06-01 | End: 2018-06-01 | Stop reason: SURG

## 2018-06-01 RX ORDER — SODIUM CHLORIDE 0.9 % (FLUSH) 0.9 %
1-10 SYRINGE (ML) INJECTION AS NEEDED
Status: DISCONTINUED | OUTPATIENT
Start: 2018-06-01 | End: 2018-06-01 | Stop reason: HOSPADM

## 2018-06-01 RX ORDER — METHYLERGONOVINE MALEATE 0.2 MG/ML
200 INJECTION INTRAVENOUS ONCE AS NEEDED
Status: DISCONTINUED | OUTPATIENT
Start: 2018-06-01 | End: 2018-06-04 | Stop reason: HOSPADM

## 2018-06-01 RX ORDER — PROMETHAZINE HYDROCHLORIDE 25 MG/1
25 TABLET ORAL EVERY 6 HOURS PRN
Status: DISCONTINUED | OUTPATIENT
Start: 2018-06-01 | End: 2018-06-04 | Stop reason: HOSPADM

## 2018-06-01 RX ORDER — ONDANSETRON 4 MG/1
4 TABLET, FILM COATED ORAL EVERY 6 HOURS PRN
Status: DISCONTINUED | OUTPATIENT
Start: 2018-06-01 | End: 2018-06-04 | Stop reason: HOSPADM

## 2018-06-01 RX ADMIN — Medication 2 MG: at 09:05

## 2018-06-01 RX ADMIN — DEXTROSE AND SODIUM CHLORIDE 125 ML/HR: 5; 900 INJECTION, SOLUTION INTRAVENOUS at 15:25

## 2018-06-01 RX ADMIN — PHENYLEPHRINE HYDROCHLORIDE 100 MCG: 10 INJECTION INTRAVENOUS at 08:19

## 2018-06-01 RX ADMIN — Medication 1 EACH: at 10:19

## 2018-06-01 RX ADMIN — SODIUM CITRATE AND CITRIC ACID MONOHYDRATE 30 ML: 500; 334 SOLUTION ORAL at 06:55

## 2018-06-01 RX ADMIN — MORPHINE SULFATE 2 MG: 2 INJECTION, SOLUTION INTRAMUSCULAR; INTRAVENOUS at 21:05

## 2018-06-01 RX ADMIN — BUPIVACAINE HYDROCHLORIDE IN DEXTROSE 12 MG: 7.5 INJECTION, SOLUTION SUBARACHNOID at 07:52

## 2018-06-01 RX ADMIN — OXYTOCIN 20 UNITS: 10 INJECTION INTRAVENOUS at 08:06

## 2018-06-01 RX ADMIN — SODIUM CHLORIDE, POTASSIUM CHLORIDE, SODIUM LACTATE AND CALCIUM CHLORIDE: 600; 310; 30; 20 INJECTION, SOLUTION INTRAVENOUS at 07:50

## 2018-06-01 RX ADMIN — MORPHINE SULFATE 3 MG: 1 INJECTION EPIDURAL; INTRATHECAL; INTRAVENOUS at 08:35

## 2018-06-01 RX ADMIN — NALBUPHINE HYDROCHLORIDE 2 MG: 10 INJECTION, SOLUTION INTRAMUSCULAR; INTRAVENOUS; SUBCUTANEOUS at 09:05

## 2018-06-01 RX ADMIN — NALBUPHINE HYDROCHLORIDE 2 MG: 10 INJECTION, SOLUTION INTRAMUSCULAR; INTRAVENOUS; SUBCUTANEOUS at 10:26

## 2018-06-01 RX ADMIN — OXYTOCIN 20 UNITS: 10 INJECTION INTRAVENOUS at 08:45

## 2018-06-01 RX ADMIN — NALBUPHINE HYDROCHLORIDE 2.5 MG: 10 INJECTION, SOLUTION INTRAMUSCULAR; INTRAVENOUS; SUBCUTANEOUS at 15:20

## 2018-06-01 RX ADMIN — PHENYLEPHRINE HYDROCHLORIDE 150 MCG: 10 INJECTION INTRAVENOUS at 07:51

## 2018-06-01 RX ADMIN — SODIUM CHLORIDE, POTASSIUM CHLORIDE, SODIUM LACTATE AND CALCIUM CHLORIDE: 600; 310; 30; 20 INJECTION, SOLUTION INTRAVENOUS at 08:25

## 2018-06-01 RX ADMIN — PHENYLEPHRINE HYDROCHLORIDE 100 MCG: 10 INJECTION INTRAVENOUS at 08:26

## 2018-06-01 RX ADMIN — SODIUM CHLORIDE, POTASSIUM CHLORIDE, SODIUM LACTATE AND CALCIUM CHLORIDE: 600; 310; 30; 20 INJECTION, SOLUTION INTRAVENOUS at 08:05

## 2018-06-01 RX ADMIN — PHENYLEPHRINE HYDROCHLORIDE 100 MCG: 10 INJECTION INTRAVENOUS at 07:55

## 2018-06-01 RX ADMIN — SODIUM CHLORIDE, POTASSIUM CHLORIDE, SODIUM LACTATE AND CALCIUM CHLORIDE 2000 ML: 600; 310; 30; 20 INJECTION, SOLUTION INTRAVENOUS at 05:18

## 2018-06-01 RX ADMIN — MORPHINE SULFATE 0.2 MG: 1 INJECTION EPIDURAL; INTRATHECAL; INTRAVENOUS at 07:52

## 2018-06-01 RX ADMIN — FAMOTIDINE 20 MG: 10 INJECTION, SOLUTION INTRAVENOUS at 06:55

## 2018-06-01 RX ADMIN — HYDROMORPHONE HYDROCHLORIDE 0.25 MG: 1 INJECTION, SOLUTION INTRAMUSCULAR; INTRAVENOUS; SUBCUTANEOUS at 16:20

## 2018-06-01 RX ADMIN — DEXTROSE AND SODIUM CHLORIDE 125 ML/HR: 5; 900 INJECTION, SOLUTION INTRAVENOUS at 23:38

## 2018-06-01 RX ADMIN — SODIUM CHLORIDE, POTASSIUM CHLORIDE, SODIUM LACTATE AND CALCIUM CHLORIDE: 600; 310; 30; 20 INJECTION, SOLUTION INTRAVENOUS at 07:15

## 2018-06-01 NOTE — ADDENDUM NOTE
Addendum  created 06/01/18 0905 by Jani Brunner CRNA    Order list changed, Order sets accessed

## 2018-06-01 NOTE — ANESTHESIA PROCEDURE NOTES
Intrathecal Block    Patient location during procedure: OR  Indication:at surgeon's request and post-op pain management  Performed By  CRNA: REGIS CAGE  Preanesthetic Checklist  Completed: patient identified, site marked, surgical consent, pre-op evaluation, timeout performed, IV checked, risks and benefits discussed and monitors and equipment checked  Additional Notes  Clear CSF return AEB positive swirl. Level of approx T-6 obtained.   Intrathecal Block Prep:  Pt Position:sitting  Sterile Tech:sterile barrier, gloves and cap  Prep:chloraprep  Monitoring:blood pressure monitoring, continuous pulse oximetry and EKG  Intrathecal Block Procedure:  Approach:midline  Guidance:landmark technique and palpation technique  Location:L4-L5  Needle Type:Pencan  Needle Gauge:25 G  Placement of Needle Event: cerebrospinal fluid  Fluid Appearance:clear  Post Assessment:  Patient Tolerance:patient tolerated the procedure well with no apparent complications  Complications:no

## 2018-06-01 NOTE — ANESTHESIA PREPROCEDURE EVALUATION
Anesthesia Evaluation     Patient summary reviewed and Nursing notes reviewed   no history of anesthetic complications:  NPO Solid Status: > 8 hours  NPO Liquid Status: > 8 hours           Airway   Mallampati: II  TM distance: >3 FB  Neck ROM: full  No difficulty expected  Dental - normal exam     Pulmonary - normal exam   Cardiovascular - normal exam  Exercise tolerance: good (4-7 METS)    (+) hypertension well controlled,       Neuro/Psych  (+) numbness,     GI/Hepatic/Renal/Endo      Musculoskeletal     Abdominal  - normal exam   Substance History      OB/GYN    (+) Pregnant, Preeclampsia, pregnancy induced hypertension        Other                        Anesthesia Plan    ASA 2     spinal   (Risks of spinal anesthesia discussed with patient, including, but not limited to: headache, itching, nausea/vomiting, infection, failure of block, fluctuations in blood pressure, decreased fetal heart rate, possible , acute or chronic back pain, nerve damage, paralysis, etc.     Pt advised that spinal anesthesia may not relieve all of the labor discomfort and that discomfort may be experienced as the baby is close to delivery.    All questions answered, informed consent obtained and time out procedure performed. )  Anesthetic plan and risks discussed with patient.

## 2018-06-01 NOTE — PLAN OF CARE
Problem: Patient Care Overview  Goal: Plan of Care Review  Outcome: Ongoing (interventions implemented as appropriate)   18   Coping/Psychosocial   Plan of Care Reviewed With patient;significant other   Plan of Care Review   Progress no change   OTHER   Outcome Summary Planned  and tubal ligation this morning. IV started and metcalf catheter placed in preparation for .      Goal: Individualization and Mutuality  Outcome: Ongoing (interventions implemented as appropriate)   18   Individualization   Patient Specific Preferences Would like to breastfeed   Patient Specific Goals (Include Timeframe) Hold baby during recovery   Mutuality/Individual Preferences   What Anxieties, Fears, Concerns, or Questions Do You Have About Your Care? Nervous about IV     Goal: Discharge Needs Assessment  Outcome: Ongoing (interventions implemented as appropriate)   18   Discharge Needs Assessment   Readmission Within the Last 30 Days no previous admission in last 30 days   Concerns to be Addressed no discharge needs identified   Patient/Family Anticipates Transition to home   Patient/Family Anticipated Services at Transition none   Transportation Concerns car, none   Transportation Anticipated family or friend will provide   Anticipated Changes Related to Illness none   Equipment Needed After Discharge none   Disability   Equipment Currently Used at Home none     Goal: Interprofessional Rounds/Family Conf  Outcome: Ongoing (interventions implemented as appropriate)   18   Interdisciplinary Rounds/Family Conf   Participants nursing

## 2018-06-01 NOTE — ADDENDUM NOTE
Addendum  created 06/01/18 1152 by Jani Brunner CRNA    Order list changed, Order sets accessed

## 2018-06-01 NOTE — ANESTHESIA POSTPROCEDURE EVALUATION
Patient: Nelsy Bill    Procedure Summary     Date:  18 Room / Location:  [facility] DELIVERY 2 / [facility]    Anesthesia Start:  737 Anesthesia Stop:  845    Procedure:   SECTION REPEAT WITH TUBAL (N/A Abdomen) Diagnosis:       39 weeks gestation of pregnancy      (39 weeks gestation of pregnancy [Z3A.39])    Surgeon:  Swetha Hernandez MD Provider:  Jani Brunner CRNA    Anesthesia Type:  spinal ASA Status:  2          Anesthesia Type: spinal  Last vitals  BP    110/44   Temp    97.4   Pulse    73   Resp      12   SpO2    92%     Post Anesthesia Care and Evaluation    Patient location during evaluation: PACU  Patient participation: complete - patient participated  Level of consciousness: awake and alert  Pain score: 0  Pain management: satisfactory to patient  Airway patency: patent  Anesthetic complications: No anesthetic complications  PONV Status: none  Cardiovascular status: acceptable  Respiratory status: acceptable  Hydration status: acceptable  Post Neuraxial Block status: Motor and sensory function returned to baseline  Comments: Second bag of oxytocin up and running at this time.

## 2018-06-01 NOTE — OP NOTE
Stew Bill  : 1990  MRN: [id number]  CSN: [id number]    Operative Report    Pre-Operative Dx:   1. IUP at 39w1d weeks   2. Previous  section - not a  candidate      Postoperative dx:    1. Same as above     Procedure: Repeat  (LTCS)       Surgeon: Swetha Hernandez M.D.   Assistant: JUDSON Dennis       Anesthesia: Spinal        EBL: 1,000 mls.       Antibiotics: cefazolin 2 gms     Drains: Metcalf     Findings: Viable female infant with nuchal cord; pt changed her mind in the OR and decided against a tubal ligation       Indications: See H&P           Procedure Details:   After the appropriate time out, the patient was prepped and draped in the usual sterile fashion.  She had been placed in the dorsal supine left lateral tilt position.  A metcalf catheter had been placed in the bladder for drainage during the procedure. A pfannenstiel skin incision was made with a knife and carried down to the fascia.  The fascia was nicked with a scalpel and the incision was extended bilaterally with curved De La Rosa scissors. The superior aspect of the fascia was grasped with Kocher clamps x 2 and bluntly as well as sharply dissected away from the underlying rectus muscles.  A similar process was repeated inferiorly. The rectus muscles were  and the peritoneal cavity was entered sharply without complications. The bladder flap was created with Metzenbaum scissors and pickups with teeth.  The  retractor was placed and after checking for no entrapment, was retracted down.  A transverse uterine incision was made with the knife and extended bilaterally.  The infant was delivered from the vertex presentation.  The mouth and nose were bulb suctioned.  The cord was doubly clamped and cut and the infant handed to the pediatric nurse in attendance.  Cord blood was obtained.  The placenta was manually extracted from the uterus.  The uterus was then elevated from the abdomen  and wiped free of remaining membranes.  The uterine incision was closed with #1 chromic in a running locking fashion with a second imbricating stitch.  The uterus had been returned to the abdomen.  The lateral gutters were wiped free of remaining clots.  The site of surgical incision was inspected and noted to be hemostatic.  The  retractor was removed.  The subfascial tissue was inspected and noted to be hemostatic.  The fascia was closed with 0 PDS in a running non-locking fashion.  Subcutaneous tissue was inspected and noted to be hemostatic with minimal bovie electrocautery.  Italia's fascia was closed with 3-0 chromic in a running non-locking fashion.  The skin was approximated with staples. Dressings were placed.  All instrument and sponge counts were correct at the end of the procedure. The patient tolerated the procedure well.  There were no complications.  She was taken to postoperative recovery room in stable condition.      Complications:   None      Disposition:   Mother to Mother Baby/Postpartum  in stable condition currently.   Baby to NBN  in stable condition currently.     Swetha Hernandez M.D.  2018  8:49 AM

## 2018-06-01 NOTE — NON STRESS TEST
"  Nelsy Bill, a  at 39w1d with an LEDA of 2018, by Ultrasound, was seen at [facility] for a nonstress test.    Chief Complaint   Patient presents with   • Scheduled      \"I'm here for my \"       Interpretation A  Nonstress Test Interpretation A: Reactive (18 0530 : Guadalupe Lacy RN)          "

## 2018-06-02 PROBLEM — Z34.90 SUPERVISION OF NORMAL PREGNANCY: Status: RESOLVED | Noted: 2018-01-11 | Resolved: 2018-06-02

## 2018-06-02 PROBLEM — Z34.90 PREGNANCY: Status: RESOLVED | Noted: 2018-06-01 | Resolved: 2018-06-02

## 2018-06-02 LAB
BASOPHILS # BLD AUTO: 0.02 10*3/MM3 (ref 0–0.2)
BASOPHILS NFR BLD AUTO: 0.2 % (ref 0–2.5)
DEPRECATED RDW RBC AUTO: 50.7 FL (ref 37–54)
EOSINOPHIL # BLD AUTO: 0.15 10*3/MM3 (ref 0–0.7)
EOSINOPHIL NFR BLD AUTO: 1.5 % (ref 0–7)
ERYTHROCYTE [DISTWIDTH] IN BLOOD BY AUTOMATED COUNT: 14.6 % (ref 11.5–14.5)
HCT VFR BLD AUTO: 37 % (ref 37–47)
HGB BLD-MCNC: 12.3 G/DL (ref 12–16)
IMM GRANULOCYTES # BLD: 0.04 10*3/MM3 (ref 0–0.06)
IMM GRANULOCYTES NFR BLD: 0.4 % (ref 0–0.6)
LYMPHOCYTES # BLD AUTO: 1.06 10*3/MM3 (ref 0.6–3.4)
LYMPHOCYTES NFR BLD AUTO: 10.3 % (ref 10–50)
MCH RBC QN AUTO: 31.3 PG (ref 27–31)
MCHC RBC AUTO-ENTMCNC: 33.2 G/DL (ref 30–37)
MCV RBC AUTO: 94.1 FL (ref 81–99)
MONOCYTES # BLD AUTO: 0.8 10*3/MM3 (ref 0–0.9)
MONOCYTES NFR BLD AUTO: 7.8 % (ref 0–12)
NEUTROPHILS # BLD AUTO: 8.23 10*3/MM3 (ref 2–6.9)
NEUTROPHILS NFR BLD AUTO: 79.8 % (ref 37–80)
NRBC BLD MANUAL-RTO: 0 /100 WBC (ref 0–0)
PLATELET # BLD AUTO: 168 10*3/MM3 (ref 130–400)
PMV BLD AUTO: 9.5 FL (ref 6–12)
RBC # BLD AUTO: 3.93 10*6/MM3 (ref 4.2–5.4)
WBC NRBC COR # BLD: 10.3 10*3/MM3 (ref 4.8–10.8)

## 2018-06-02 PROCEDURE — 85025 COMPLETE CBC W/AUTO DIFF WBC: CPT | Performed by: OBSTETRICS & GYNECOLOGY

## 2018-06-02 RX ADMIN — HYDROCORTISONE 1 APPLICATION: 25 CREAM TOPICAL at 23:10

## 2018-06-02 RX ADMIN — IBUPROFEN 600 MG: 600 TABLET ORAL at 08:30

## 2018-06-02 RX ADMIN — OXYCODONE HYDROCHLORIDE AND ACETAMINOPHEN 2 TABLET: 5; 325 TABLET ORAL at 02:19

## 2018-06-02 RX ADMIN — OXYCODONE HYDROCHLORIDE AND ACETAMINOPHEN 2 TABLET: 5; 325 TABLET ORAL at 08:30

## 2018-06-02 RX ADMIN — OXYCODONE HYDROCHLORIDE AND ACETAMINOPHEN 2 TABLET: 5; 325 TABLET ORAL at 20:50

## 2018-06-02 RX ADMIN — OXYCODONE HYDROCHLORIDE AND ACETAMINOPHEN 2 TABLET: 5; 325 TABLET ORAL at 14:49

## 2018-06-02 RX ADMIN — IBUPROFEN 600 MG: 600 TABLET ORAL at 14:49

## 2018-06-02 NOTE — PLAN OF CARE
Problem: Patient Care Overview  Goal: Plan of Care Review  Outcome: Ongoing (interventions implemented as appropriate)   18 0613 18 1620 18 0536   Coping/Psychosocial   Plan of Care Reviewed With --  patient --    Plan of Care Review   Progress no change --  --    OTHER   Outcome Summary --  --  routine care      Goal: Individualization and Mutuality  Outcome: Ongoing (interventions implemented as appropriate)   18 0536   Individualization   Patient Specific Preferences Would like to breastfeed --    Patient Specific Goals (Include Timeframe) --  room in with baby   Patient Specific Interventions --  feed baby on demand     Goal: Discharge Needs Assessment  Outcome: Ongoing (interventions implemented as appropriate)   18   Discharge Needs Assessment   Readmission Within the Last 30 Days no previous admission in last 30 days   Concerns to be Addressed no discharge needs identified   Patient/Family Anticipates Transition to home   Patient/Family Anticipated Services at Transition none   Transportation Concerns car, none   Transportation Anticipated family or friend will provide   Anticipated Changes Related to Illness none   Equipment Needed After Discharge none   Disability   Equipment Currently Used at Home none     Goal: Interprofessional Rounds/Family Conf  Outcome: Ongoing (interventions implemented as appropriate)   18 0536   Interdisciplinary Rounds/Family Conf   Participants patient;physician       Problem:  Delivery (Adult,Obstetrics,Pediatric)  Goal: Signs and Symptoms of Listed Potential Problems Will be Absent, Minimized or Managed ( Delivery)  Outcome: Ongoing (interventions implemented as appropriate)   18 0536   Goal/Outcome Evaluation   Problems Assessed ( Delivery) all   Problems Present ( Delivery) none

## 2018-06-02 NOTE — CONSULTS
Patient: Nelsy Bill  Procedure(s):   SECTION REPEAT  Anesthesia type: [unfilled]    Patient location: [facility] Surgical Floor  Last vitals:   Vitals:    18 0518   BP: 112/52   Pulse: 90   Resp: 18   Temp: 98.2 °F (36.8 °C)   SpO2: 94%     Level of consciousness: awake, alert and oriented    Post-anesthesia pain: adequate analgesia  Airway patency: patent  Respiratory: unassisted  Cardiovascular: stable and blood pressure at baseline  Hydration: euvolemic    Anesthetic complications: no

## 2018-06-02 NOTE — PROGRESS NOTES
MIGUEL Stew Bill  : 1990  MRN: [id number]  CSN: [id number]    Post-operative Day #1  Subjective   Her pain is well controlled.  Vaginal bleeding is appropriate amount.  She is not passing gas and has not had a bowel movement.  Upon review of her respiratory system, she has no respiratory symptoms and and denies SOB, cough, wheezing, chest pain. Breastfeeding is going well.     Objective     Min/max vitals past 24 hours:   Temp  Min: 97.5 °F (36.4 °C)  Max: 98.4 °F (36.9 °C)  BP  Min: 101/50  Max: 133/54  Pulse  Min: 80  Max: 97  Resp  Min: 18  Max: 20        General: well developed; well nourished  no acute distress   Resp: breathing is unlabored   CV: Not performed.   Abdomen: soft, non-tender; no masses  incision is covered by bandage  + bowel sounds   Pelvic: Not performed   Ext: normal appearance with no cyanosis  and no calf tenderness, + edema     Last 3 values     Results from last 7 days  Lab Units 18  0607 18  1030   WBC 10*3/mm3 10.30 8.67   HEMOGLOBIN g/dL 12.3 13.5   HEMATOCRIT % 37.0 39.9   PLATELETS 10*3/mm3 168 203     Lab Results   Component Value Date    ABO O 2018    RH Positive 2018    RUBELLAABIGG 1.16 2017    HEPBSAG Negative 2017          Assessment   1. POD #1 S/P Repeat  (LTCS)     Plan   1. Continue routine post-operative care    Bailey Larry, JULIANNE  2018  9:28 AM

## 2018-06-03 RX ADMIN — IBUPROFEN 600 MG: 600 TABLET ORAL at 15:59

## 2018-06-03 RX ADMIN — IBUPROFEN 600 MG: 600 TABLET ORAL at 02:01

## 2018-06-03 RX ADMIN — DOCUSATE SODIUM 100 MG: 100 CAPSULE, LIQUID FILLED ORAL at 08:36

## 2018-06-03 RX ADMIN — OXYCODONE HYDROCHLORIDE AND ACETAMINOPHEN 2 TABLET: 5; 325 TABLET ORAL at 08:36

## 2018-06-03 RX ADMIN — IBUPROFEN 600 MG: 600 TABLET ORAL at 08:36

## 2018-06-03 RX ADMIN — OXYCODONE HYDROCHLORIDE AND ACETAMINOPHEN 2 TABLET: 5; 325 TABLET ORAL at 02:01

## 2018-06-03 RX ADMIN — OXYCODONE HYDROCHLORIDE AND ACETAMINOPHEN 2 TABLET: 5; 325 TABLET ORAL at 15:59

## 2018-06-03 NOTE — PLAN OF CARE
Problem: Patient Care Overview  Goal: Plan of Care Review  Outcome: Ongoing (interventions implemented as appropriate)   18   Coping/Psychosocial   Plan of Care Reviewed With patient     Goal: Individualization and Mutuality  Outcome: Ongoing (interventions implemented as appropriate)   18 0618 0536   Individualization   Patient Specific Preferences Would like to breastfeed --    Patient Specific Goals (Include Timeframe) --  room in with baby   Patient Specific Interventions --  feed baby on demand     Goal: Discharge Needs Assessment  Outcome: Ongoing (interventions implemented as appropriate)   18   Discharge Needs Assessment   Readmission Within the Last 30 Days no previous admission in last 30 days   Concerns to be Addressed no discharge needs identified   Patient/Family Anticipates Transition to home   Patient/Family Anticipated Services at Transition none   Transportation Concerns car, none   Transportation Anticipated family or friend will provide   Anticipated Changes Related to Illness none   Equipment Needed After Discharge none   Disability   Equipment Currently Used at Home none     Goal: Interprofessional Rounds/Family Conf  Outcome: Ongoing (interventions implemented as appropriate)      Problem:  Delivery (Adult,Obstetrics,Pediatric)  Goal: Signs and Symptoms of Listed Potential Problems Will be Absent, Minimized or Managed ( Delivery)  Outcome: Ongoing (interventions implemented as appropriate)   18   Goal/Outcome Evaluation   Problems Assessed ( Delivery) all   Problems Present ( Delivery) none

## 2018-06-03 NOTE — PROGRESS NOTES
Stew Bill  : 1990  MRN: [id number]  CSN: [id number]    Post-operative Day #2  Subjective   Her pain is well controlled.  Vaginal bleeding is appropriate amount.  She is passing gas and has not had a bowel movement.  Upon review of her respiratory system, she has no respiratory symptoms and and denies SOB, cough, wheezing, chest pain. Breastfeeding is going well.     Objective     Min/max vitals past 24 hours:   Temp  Min: 97.7 °F (36.5 °C)  Max: 97.8 °F (36.6 °C)  BP  Min: 111/60  Max: 124/55  Pulse  Min: 82  Max: 92  Resp  Min: 18  Max: 18        General: well developed; well nourished  no acute distress   Resp: breathing is unlabored   CV: Not performed.   Abdomen: soft, non-tender; no masses  incision is healing and staples intact  very red rash noted where bandage was removed yesterday. She also has a fine rash that extends up towards her breast and around her sides. She says it itches sometimes. No rash on extremities, back or chest.   Pelvic: Not performed   Ext: normal appearance with no cyanosis or edema and no calf tenderness       Results from last 7 days  Lab Units 18  0607 18  1030   WBC 10*3/mm3 10.30 8.67   HEMOGLOBIN g/dL 12.3 13.5   HEMATOCRIT % 37.0 39.9   PLATELETS 10*3/mm3 168 203     Lab Results   Component Value Date    ABO O 2018    RH Positive 2018    RUBELLAABIGG 1.16 2017    HEPBSAG Negative 2017        Assessment   1. POD #2 S/P Repeat  (LTCS)   2. Skin rash possibly from tape.     Plan   1. Continue routine post-operative care  2. Will watch rash today. Continue Hydrocortisone cream    Bailey Larry, JULIANNE  6/3/2018  9:14 AM

## 2018-06-03 NOTE — PLAN OF CARE
Problem: Patient Care Overview  Goal: Plan of Care Review  Outcome: Ongoing (interventions implemented as appropriate)   18 0613 18 1620 18 0536   Coping/Psychosocial   Plan of Care Reviewed With --  patient --    Plan of Care Review   Progress no change --  --    OTHER   Outcome Summary --  --  routine care      Goal: Individualization and Mutuality  Outcome: Ongoing (interventions implemented as appropriate)   18 0613 18 0536   Individualization   Patient Specific Preferences Would like to breastfeed --    Patient Specific Goals (Include Timeframe) --  room in with baby   Patient Specific Interventions --  feed baby on demand     Goal: Discharge Needs Assessment  Outcome: Ongoing (interventions implemented as appropriate)   18 0613   Discharge Needs Assessment   Readmission Within the Last 30 Days no previous admission in last 30 days   Concerns to be Addressed no discharge needs identified   Patient/Family Anticipates Transition to home   Patient/Family Anticipated Services at Transition none   Transportation Concerns car, none   Transportation Anticipated family or friend will provide   Anticipated Changes Related to Illness none   Equipment Needed After Discharge none   Disability   Equipment Currently Used at Home none     Goal: Interprofessional Rounds/Family Conf  Outcome: Ongoing (interventions implemented as appropriate)   18 0536   Interdisciplinary Rounds/Family Conf   Participants patient;physician       Problem:  Delivery (Adult,Obstetrics,Pediatric)  Goal: Signs and Symptoms of Listed Potential Problems Will be Absent, Minimized or Managed ( Delivery)  Outcome: Ongoing (interventions implemented as appropriate)   18 0330   Goal/Outcome Evaluation   Problems Assessed ( Delivery) all   Problems Present ( Delivery) none

## 2018-06-04 VITALS
WEIGHT: 230 LBS | OXYGEN SATURATION: 96 % | BODY MASS INDEX: 36.96 KG/M2 | HEART RATE: 86 BPM | DIASTOLIC BLOOD PRESSURE: 60 MMHG | TEMPERATURE: 98.3 F | SYSTOLIC BLOOD PRESSURE: 127 MMHG | RESPIRATION RATE: 16 BRPM | HEIGHT: 66 IN

## 2018-06-04 RX ORDER — DIPHENHYDRAMINE HCL 25 MG
25 CAPSULE ORAL EVERY 4 HOURS PRN
Qty: 30 CAPSULE | Refills: 0 | Status: SHIPPED | OUTPATIENT
Start: 2018-06-04 | End: 2018-07-10

## 2018-06-04 RX ORDER — IBUPROFEN 600 MG/1
TABLET ORAL
Qty: 30 TABLET | Refills: 0 | Status: SHIPPED | OUTPATIENT
Start: 2018-06-04 | End: 2018-07-10

## 2018-06-04 RX ORDER — OXYCODONE HYDROCHLORIDE AND ACETAMINOPHEN 5; 325 MG/1; MG/1
TABLET ORAL
Qty: 30 TABLET | Refills: 0
Start: 2018-06-04 | End: 2018-07-10

## 2018-06-04 RX ORDER — PSEUDOEPHEDRINE HCL 30 MG
100 TABLET ORAL 2 TIMES DAILY PRN
Qty: 60 CAPSULE | Refills: 0 | Status: SHIPPED | OUTPATIENT
Start: 2018-06-04 | End: 2018-07-10

## 2018-06-04 RX ADMIN — DOCUSATE SODIUM 100 MG: 100 CAPSULE, LIQUID FILLED ORAL at 07:45

## 2018-06-04 RX ADMIN — IBUPROFEN 600 MG: 600 TABLET ORAL at 07:45

## 2018-06-04 RX ADMIN — OXYCODONE HYDROCHLORIDE AND ACETAMINOPHEN 2 TABLET: 5; 325 TABLET ORAL at 07:45

## 2018-06-04 RX ADMIN — IBUPROFEN 600 MG: 600 TABLET ORAL at 00:35

## 2018-06-04 RX ADMIN — OXYCODONE HYDROCHLORIDE AND ACETAMINOPHEN 2 TABLET: 5; 325 TABLET ORAL at 00:35

## 2018-06-04 NOTE — DISCHARGE SUMMARY
Discharge Summary      Date of Admission: 2018   Date of Discharge: 2018   Admitting Diagnosis: 39 weeks gestation of pregnancy [Z3A.39]  Pregnancy [Z34.90]   Discharge Diagnosis: Repeat  section at term viable female    Procedures Performed: Procedure(s):   SECTION REPEAT      Consults: Consults     No orders found from 5/3/2018 to 2018.         Brief History: Patient is a 27 y.o. female presented for repeat  section at term   Hospital Course: Benign post partum course. Patient ready for discharge day 3 postpartum. She is ambulating well, voiding well, passing gas, no bowel movement yet. Abdomen soft nontender incision healing well, extremities benign         Pending Studies:      Condition at discharge: stable   Discharge Medications:  Nelsy Bill   Home Medication Instructions CHARITY:[id number]    Printed on:18 0809   Medication Information                      diphenhydrAMINE (BENADRYL) 25 mg capsule  Take 1 capsule by mouth Every 4 (Four) Hours As Needed for Itching.             docusate sodium 100 MG capsule  Take 100 mg by mouth 2 (Two) Times a Day As Needed for Constipation.             ibuprofen (ADVIL,MOTRIN) 600 MG tablet  One tablet po q 6 hours prn pain             oxyCODONE-acetaminophen (PERCOCET) 5-325 MG per tablet  One tablet po q 6 hours prn pain             Prenatal Vit-Fe Fumarate-FA (PNV PRENATAL PLUS MULTIVITAMIN) 27-1 MG tablet  Take 1 tablet by mouth Daily.                 Discharge Disposition: Home or Self Care   Follow-up: No future appointments.  Additional Instructions for the Follow-ups that You Need to Schedule     Discharge Follow-up with Specified Provider: [facility]  Mathias; 1 Week    As directed      To:  DENNIS Mathias    Follow Up:  1 Week                Time: Discharge 15 min    Follow up: 1 week  This note has been electronically signed.    Ada Mata PA-C   2018

## 2018-06-04 NOTE — PLAN OF CARE
Problem: Patient Care Overview  Goal: Plan of Care Review  Outcome: Ongoing (interventions implemented as appropriate)   18 0536 18 1843   Coping/Psychosocial   Plan of Care Reviewed With --  --  patient   Plan of Care Review   Progress no change --  --    OTHER   Outcome Summary --  routine care  --      Goal: Individualization and Mutuality  Outcome: Ongoing (interventions implemented as appropriate)   18 0536   Individualization   Patient Specific Preferences Would like to breastfeed --    Patient Specific Goals (Include Timeframe) --  room in with baby   Patient Specific Interventions --  feed baby on demand     Goal: Discharge Needs Assessment  Outcome: Ongoing (interventions implemented as appropriate)   18   Discharge Needs Assessment   Readmission Within the Last 30 Days no previous admission in last 30 days   Concerns to be Addressed no discharge needs identified   Patient/Family Anticipates Transition to home   Patient/Family Anticipated Services at Transition none   Transportation Concerns car, none   Transportation Anticipated family or friend will provide   Anticipated Changes Related to Illness none   Equipment Needed After Discharge none   Disability   Equipment Currently Used at Home none     Goal: Interprofessional Rounds/Family Conf  Outcome: Ongoing (interventions implemented as appropriate)   18 0536   Interdisciplinary Rounds/Family Conf   Participants patient;physician       Problem:  Delivery (Adult,Obstetrics,Pediatric)  Goal: Signs and Symptoms of Listed Potential Problems Will be Absent, Minimized or Managed ( Delivery)  Outcome: Ongoing (interventions implemented as appropriate)   18 1843   Goal/Outcome Evaluation   Problems Assessed ( Delivery) all   Problems Present ( Delivery) none

## 2018-06-08 ENCOUNTER — OFFICE VISIT (OUTPATIENT)
Dept: OBSTETRICS AND GYNECOLOGY | Facility: CLINIC | Age: 28
End: 2018-06-08

## 2018-06-08 VITALS
BODY MASS INDEX: 33.43 KG/M2 | HEIGHT: 66 IN | SYSTOLIC BLOOD PRESSURE: 116 MMHG | DIASTOLIC BLOOD PRESSURE: 76 MMHG | WEIGHT: 208 LBS

## 2018-06-08 DIAGNOSIS — Z30.017 ENCOUNTER FOR INITIAL PRESCRIPTION OF IMPLANTABLE SUBDERMAL CONTRACEPTIVE: ICD-10-CM

## 2018-06-08 DIAGNOSIS — Z09 POSTOP CHECK: Primary | ICD-10-CM

## 2018-06-08 PROCEDURE — 99024 POSTOP FOLLOW-UP VISIT: CPT | Performed by: PHYSICIAN ASSISTANT

## 2018-06-08 NOTE — PATIENT INSTRUCTIONS
rto 5 weeks or prn  Discussed contraceptive options. She is 100% sure wants nexplanon again  Order placed

## 2018-06-08 NOTE — PROGRESS NOTES
"Subjective   Chief Complaint   Patient presents with   • Follow-up     1 week post op C/S; staples removed       Nelsy Bill is a 27 y.o. year old  presenting to be seen for post op visit  She is one week post op  section  Has no complaints today-doing well-normal bowel and bladder function  Lochia light  No pp blues  She requests steri strips not be applied due to previous allergic reaction    Past Medical History:   Diagnosis Date   • Patient denies medical problems    • Positive testing for group B Streptococcus 2018   • Preeclampsia    • Varicella         Current Outpatient Prescriptions:   •  ibuprofen (ADVIL,MOTRIN) 600 MG tablet, One tablet po q 6 hours prn pain, Disp: 30 tablet, Rfl: 0  •  oxyCODONE-acetaminophen (PERCOCET) 5-325 MG per tablet, One tablet po q 6 hours prn pain, Disp: 30 tablet, Rfl: 0  •  Prenatal Vit-Fe Fumarate-FA (PNV PRENATAL PLUS MULTIVITAMIN) 27-1 MG tablet, Take 1 tablet by mouth Daily. , Disp: , Rfl: 3  •  diphenhydrAMINE (BENADRYL) 25 mg capsule, Take 1 capsule by mouth Every 4 (Four) Hours As Needed for Itching., Disp: 30 capsule, Rfl: 0  •  docusate sodium 100 MG capsule, Take 100 mg by mouth 2 (Two) Times a Day As Needed for Constipation., Disp: 60 capsule, Rfl: 0    Current Facility-Administered Medications:   •  [START ON 2018] Etonogestrel (NEXPLANON) 68 MG subdermal implant 1 each, 1 each, Intradermal, Once, Ada Mata PA-C   Allergies   Allergen Reactions   • Mastisol [Wound Dressing Adhesive] Rash     \"a terrible fire burning, blistered rash\"   • Other Rash     Steri-strips      Past Surgical History:   Procedure Laterality Date   •  SECTION  ,     x's 2   •  SECTION WITH TUBAL N/A 2018    Procedure:  SECTION REPEAT;  Surgeon: Swetha Hernandez MD;  Location: [facility] LABOR DELIVERY;  Service: Obstetrics/Gynecology   • KNEE ACL RECONSTRUCTION     • WISDOM TOOTH EXTRACTION        Social " "History     Social History   • Marital status:      Spouse name: N/A   • Number of children: N/A   • Years of education: N/A     Occupational History   • Not on file.     Social History Main Topics   • Smoking status: Never Smoker   • Smokeless tobacco: Never Used   • Alcohol use No   • Drug use: No   • Sexual activity: Yes     Partners: Male     Birth control/ protection: None      Comment: Plans bilateral tubal ligation      Other Topics Concern   • Not on file     Social History Narrative   • No narrative on file      Family History   Problem Relation Age of Onset   • Prostate cancer Paternal Grandfather    • Cancer Paternal Grandmother    • Lung cancer Maternal Grandmother    • Prostate cancer Maternal Grandfather        Review of Systems        Objective   /76   Ht 167.6 cm (66\")   Wt 94.3 kg (208 lb)   LMP 08/26/2017 (Exact Date)   Breastfeeding? Yes   BMI 33.57 kg/m²     Physical Exam   Constitutional: She appears well-developed and well-nourished. She is cooperative.   Abdominal: Soft. Normal appearance. She exhibits no distension. There is no tenderness. There is no rigidity and no guarding.   Incision healing well   Neurological: She is alert.   Skin: Skin is warm and dry.   Psychiatric: She has a normal mood and affect. Her behavior is normal.            Assessment and Plan  Nelsy was seen today for follow-up.    Diagnoses and all orders for this visit:    Postop check    Encounter for initial prescription of implantable subdermal contraceptive  -     Etonogestrel (NEXPLANON) 68 MG subdermal implant 1 each; Inject 1 each into the skin 1 (One) Time.      Patient Instructions   rto 5 weeks or prn  Discussed contraceptive options. She is 100% sure wants nexplanon again  Order placed             This note was electronically signed.    Ada Mata PA-C   June 8, 2018  "

## 2018-07-10 ENCOUNTER — POSTPARTUM VISIT (OUTPATIENT)
Dept: OBSTETRICS AND GYNECOLOGY | Facility: CLINIC | Age: 28
End: 2018-07-10

## 2018-07-10 VITALS
BODY MASS INDEX: 31.98 KG/M2 | DIASTOLIC BLOOD PRESSURE: 76 MMHG | SYSTOLIC BLOOD PRESSURE: 136 MMHG | HEIGHT: 66 IN | WEIGHT: 199 LBS

## 2018-07-10 PROBLEM — M54.31 SCIATICA OF RIGHT SIDE: Status: RESOLVED | Noted: 2017-12-07 | Resolved: 2018-07-10

## 2018-07-10 PROBLEM — B95.1 POSITIVE TESTING FOR GROUP B STREPTOCOCCUS: Status: RESOLVED | Noted: 2018-05-07 | Resolved: 2018-07-10

## 2018-07-10 PROBLEM — Z98.891 HX OF CESAREAN SECTION: Status: RESOLVED | Noted: 2018-01-11 | Resolved: 2018-07-10

## 2018-07-10 PROCEDURE — 0503F POSTPARTUM CARE VISIT: CPT | Performed by: MIDWIFE

## 2018-07-10 NOTE — PROGRESS NOTES
"Subjective   Chief Complaint   Patient presents with   • Postpartum Care     delivered on 18  by Dr Hernandez, pap due, breast feeding, has Nexplanon here, no complaints      Nelsy Bill is a 27 y.o. year old  presenting to be seen for her postpartum visit.  She had a Repeat  (LTCS).    Since delivery she has not been sexually active.  She does not have concerns about post-partum blues/depression.   She is breast feeding and plans to continues for 1 year(s).  For ongoing contraception, her plans are Nexplanon.    The following portions of the patient's history were reviewed and updated as appropriate:current medications and allergies    Smoking status: Never Smoker                                                              Smokeless tobacco: Never Used                          @ROS@  Review of Systems  Consitutional NEG: anorexia or night sweats    POS: nothing reported   Gastointestinal NEG: bloating, change in bowel habits, melena or reflux symptoms    POS: nothing reported   Genitourinary NEG: dysuria or hematuria    POS: nothing reported   Integument NEG: moles that are changing in size, shape, color or rashes    POS: nothing reported   Breast NEG: persistent breast lump, skin dimpling or nipple discharge    POS: nothing reported        Objective   /76   Ht 167.6 cm (66\")   Wt 90.3 kg (199 lb)   LMP 2017 (Exact Date)   Breastfeeding? Yes   BMI 32.12 kg/m²     General:  well developed; well nourished  no acute distress   Abdomen: soft, non-tender; no masses  no umbilical or inginual hernias are present  incision is healed   Pelvis: External genitalia:  normal appearance of the external genitalia including Bartholin's and Goldstream's glands.  Vaginal:  normal pink mucosa without prolapse or lesions. discharge present -  pink tinged;  Cervix:  normal appearance.  Uterus:  normal size, shape and consistency.  Adnexa:  normal bimanual exam of the adnexa.        "   Assessment   1. Normal 6 week postpartum exam  2. Desires Nexplanon     Plan   1. BC options reviewed and compared today: Nexplanon  2. Meds were prescribed - no  3. Pap was done today.  If she does not receive the results of the Pap within 2 weeks  time, she was instructed to call to find out the results.  I explained to Nelsy that the recommendations for Pap smear interval in a low risk patient  has lengthened to 3 years time.  I encouraged her to be seen yearly for a full physical exam including breast and pelvic exam even during the off years when PAP's will not be performed.  4. Follow up for Nexplanon insertion    No orders of the defined types were placed in this encounter.         This note was electronically signed.  Bailey Larry, JULIANNE  7/10/2018

## 2018-07-23 ENCOUNTER — OFFICE VISIT (OUTPATIENT)
Dept: OBSTETRICS AND GYNECOLOGY | Facility: CLINIC | Age: 28
End: 2018-07-23

## 2018-07-23 ENCOUNTER — LAB (OUTPATIENT)
Dept: LAB | Facility: HOSPITAL | Age: 28
End: 2018-07-23

## 2018-07-23 VITALS
WEIGHT: 201 LBS | HEIGHT: 66 IN | BODY MASS INDEX: 32.3 KG/M2 | DIASTOLIC BLOOD PRESSURE: 72 MMHG | SYSTOLIC BLOOD PRESSURE: 120 MMHG

## 2018-07-23 DIAGNOSIS — Z30.017 ENCOUNTER FOR INSERTION SUBDERMAL CONTRACEPTIVE: Primary | ICD-10-CM

## 2018-07-23 DIAGNOSIS — Z30.46 ENCOUNTER FOR SURVEILLANCE OF IMPLANTABLE SUBDERMAL CONTRACEPTIVE: Primary | ICD-10-CM

## 2018-07-23 DIAGNOSIS — Z30.46 ENCOUNTER FOR SURVEILLANCE OF IMPLANTABLE SUBDERMAL CONTRACEPTIVE: ICD-10-CM

## 2018-07-23 LAB
BASOPHILS # BLD AUTO: 0.03 10*3/MM3 (ref 0–0.2)
BASOPHILS NFR BLD AUTO: 0.5 % (ref 0–2.5)
DEPRECATED RDW RBC AUTO: 41.4 FL (ref 37–54)
EOSINOPHIL # BLD AUTO: 0.23 10*3/MM3 (ref 0–0.7)
EOSINOPHIL NFR BLD AUTO: 3.7 % (ref 0–7)
ERYTHROCYTE [DISTWIDTH] IN BLOOD BY AUTOMATED COUNT: 12.8 % (ref 11.5–14.5)
HCG INTACT+B SERPL-ACNC: <2.39 MIU/ML
HCT VFR BLD AUTO: 44.6 % (ref 37–47)
HGB BLD-MCNC: 14.8 G/DL (ref 12–16)
IMM GRANULOCYTES # BLD: 0.02 10*3/MM3 (ref 0–0.06)
IMM GRANULOCYTES NFR BLD: 0.3 % (ref 0–0.6)
LYMPHOCYTES # BLD AUTO: 1.73 10*3/MM3 (ref 0.6–3.4)
LYMPHOCYTES NFR BLD AUTO: 28.1 % (ref 10–50)
MCH RBC QN AUTO: 29.7 PG (ref 27–31)
MCHC RBC AUTO-ENTMCNC: 33.2 G/DL (ref 30–37)
MCV RBC AUTO: 89.4 FL (ref 81–99)
MONOCYTES # BLD AUTO: 0.45 10*3/MM3 (ref 0–0.9)
MONOCYTES NFR BLD AUTO: 7.3 % (ref 0–12)
NEUTROPHILS # BLD AUTO: 3.69 10*3/MM3 (ref 2–6.9)
NEUTROPHILS NFR BLD AUTO: 60.1 % (ref 37–80)
NRBC BLD MANUAL-RTO: 0 /100 WBC (ref 0–0)
PLATELET # BLD AUTO: 281 10*3/MM3 (ref 130–400)
PMV BLD AUTO: 9.1 FL (ref 6–12)
RBC # BLD AUTO: 4.99 10*6/MM3 (ref 4.2–5.4)
WBC NRBC COR # BLD: 6.15 10*3/MM3 (ref 4.8–10.8)

## 2018-07-23 PROCEDURE — 84702 CHORIONIC GONADOTROPIN TEST: CPT

## 2018-07-23 PROCEDURE — 36415 COLL VENOUS BLD VENIPUNCTURE: CPT | Performed by: NURSE PRACTITIONER

## 2018-07-23 PROCEDURE — 85025 COMPLETE CBC W/AUTO DIFF WBC: CPT | Performed by: NURSE PRACTITIONER

## 2018-07-23 PROCEDURE — 11981 INSERTION DRUG DLVR IMPLANT: CPT | Performed by: PHYSICIAN ASSISTANT

## 2018-07-23 NOTE — PROGRESS NOTES
Nexplanon Insertion    No LMP recorded.    Date of procedure:  7/23/2018    Risks and benefits discussed? yes  All questions answered? yes  Consents given by the patient  Written consent obtained? yes    Local anesthesia used:  yes - 1.0 cc's of  Meds; anesthesia local: 1% lidocaine with epinephrine    Procedure documentation:    The upper left arm was marked at the intended site of insertion. Nexplanon inserted at site of previous nexplanon insertion per patient request.  Betadine was used to cleanse the skin.  Local anesthesia was injected.  The Nexplanon was placed subdermally without difficulty.  The devise was able to be palpated in the arm by both myself and Nelsy.  Insertion site was wrapped with compression dressing. Steri strip not placed as patient allergic to them.  She tolerated the procedure well.  There were no complications.  EBL was minimal.    Post procedure instructions: Remove the wrapping in 24-48 hours     Follow up needed: PRN    This note was electronically signed.    Ada Mata PA-C  July 23, 2018

## 2020-06-08 ENCOUNTER — APPOINTMENT (OUTPATIENT)
Dept: GENERAL RADIOLOGY | Facility: HOSPITAL | Age: 30
End: 2020-06-08

## 2020-06-08 ENCOUNTER — HOSPITAL ENCOUNTER (EMERGENCY)
Facility: HOSPITAL | Age: 30
Discharge: HOME | End: 2020-06-08
Attending: EMERGENCY MEDICINE | Admitting: EMERGENCY MEDICINE

## 2020-06-08 VITALS
WEIGHT: 222 LBS | SYSTOLIC BLOOD PRESSURE: 131 MMHG | OXYGEN SATURATION: 99 % | BODY MASS INDEX: 35.68 KG/M2 | HEART RATE: 63 BPM | TEMPERATURE: 99.3 F | RESPIRATION RATE: 18 BRPM | DIASTOLIC BLOOD PRESSURE: 79 MMHG | HEIGHT: 66 IN

## 2020-06-08 DIAGNOSIS — M25.462 EFFUSION OF LEFT KNEE: Primary | ICD-10-CM

## 2020-06-08 PROCEDURE — 73562 X-RAY EXAM OF KNEE 3: CPT

## 2020-06-08 PROCEDURE — 99283 EMERGENCY DEPT VISIT LOW MDM: CPT

## 2020-06-08 RX ORDER — ETODOLAC 200 MG/1
200 CAPSULE ORAL EVERY 8 HOURS
Qty: 15 CAPSULE | Refills: 0 | Status: SHIPPED | OUTPATIENT
Start: 2020-06-08 | End: 2020-06-08 | Stop reason: SDUPTHER

## 2020-06-08 RX ORDER — ETODOLAC 200 MG/1
200 CAPSULE ORAL EVERY 8 HOURS
Qty: 15 CAPSULE | Refills: 0 | Status: SHIPPED | OUTPATIENT
Start: 2020-06-08 | End: 2021-07-09

## 2020-06-08 NOTE — ED PROVIDER NOTES
"Subjective   This patient complains of 4 days of left knee pain and swelling laterally.  She states in 2006 she had an ACL injury which required arthroscopic repair.  She denies any specific known injury this time but states she could have injured it playing with her children several days ago.  She complains of pain to the left lateral knee with some tenderness to the tendons to the left posterior lateral knee. No calf pain or tenderness.          Review of Systems   Constitutional: Negative.    HENT: Negative.    Eyes: Negative.    Respiratory: Negative.    Cardiovascular: Negative.    Gastrointestinal: Negative.    Genitourinary: Negative.    Musculoskeletal:        Left knee pain with soft tissue swelling laterally.    Skin: Negative.    Neurological: Negative.    Psychiatric/Behavioral: Negative.        Past Medical History:   Diagnosis Date   • Patient denies medical problems    • Positive testing for group B Streptococcus 2018   • Preeclampsia    • Varicella        Allergies   Allergen Reactions   • Mastisol [Wound Dressing Adhesive] Rash     \"a terrible fire burning, blistered rash\"   • Other Rash     Steri-strips       Past Surgical History:   Procedure Laterality Date   •  SECTION  ,     x's 2   •  SECTION WITH TUBAL N/A 2018    Procedure:  SECTION REPEAT;  Surgeon: Swetha Hernandez MD;  Location: [facility] LABOR DELIVERY;  Service: Obstetrics/Gynecology   • KNEE ACL RECONSTRUCTION     • WISDOM TOOTH EXTRACTION         Family History   Problem Relation Age of Onset   • Prostate cancer Paternal Grandfather    • Cancer Paternal Grandmother    • Lung cancer Maternal Grandmother    • Prostate cancer Maternal Grandfather        Social History     Socioeconomic History   • Marital status:      Spouse name: Not on file   • Number of children: Not on file   • Years of education: Not on file   • Highest education level: Not on file   Tobacco Use   • Smoking " status: Never Smoker   • Smokeless tobacco: Never Used   Substance and Sexual Activity   • Alcohol use: No   • Drug use: No   • Sexual activity: Yes     Partners: Male     Birth control/protection: Implant     Comment: Plans bilateral tubal ligation            Objective   Physical Exam   Constitutional: She appears well-developed and well-nourished.   HENT:   Head: Normocephalic and atraumatic.   Eyes: EOM are normal.   Neck: Normal range of motion.   Cardiovascular: Normal rate and regular rhythm.   Pulmonary/Chest: Effort normal.   Musculoskeletal: Normal range of motion.   Soft tissue swelling to left lateral knee.  No calf tenderness or swelling.  2+ DP pulse on the left.  No rash.  No warmth or erythema overlying the left knee.   Neurological: She is alert.   Skin: Skin is warm and dry.   Psychiatric: She has a normal mood and affect. Her behavior is normal. Thought content normal.   Nursing note and vitals reviewed.      Procedures           ED Course                                           MDM  1144  Noted small effusion on plain film.  No signs of septic joint.  Discussed with Dr. Curry, no indication for arthrocentesis at this time.  Will apply an Ace wrap patient declined crutches she has follow-up with orthopedics on Friday  Final diagnoses:   Effusion of left knee            Mani Stovall PA-C  06/08/20 1140

## 2020-08-25 ENCOUNTER — PROCEDURE VISIT (OUTPATIENT)
Dept: OBSTETRICS AND GYNECOLOGY | Facility: CLINIC | Age: 30
End: 2020-08-25

## 2020-08-25 VITALS
WEIGHT: 217.4 LBS | SYSTOLIC BLOOD PRESSURE: 118 MMHG | DIASTOLIC BLOOD PRESSURE: 80 MMHG | HEIGHT: 66 IN | BODY MASS INDEX: 34.94 KG/M2

## 2020-08-25 DIAGNOSIS — Z30.46 NEXPLANON REMOVAL: Primary | ICD-10-CM

## 2020-08-25 PROCEDURE — 11982 REMOVE DRUG IMPLANT DEVICE: CPT | Performed by: PHYSICIAN ASSISTANT

## 2020-08-25 RX ORDER — VITAMIN A ACETATE, .BETA.-CAROTENE, ASCORBIC ACID, CHOLECALCIFEROL, .ALPHA.-TOCOPHEROL ACETATE, DL-, THIAMINE MONONITRATE, RIBOFLAVIN, NIACINAMIDE, PYRIDOXINE HYDROCHLORIDE, FOLIC ACID, CYANOCOBALAMIN, CALCIUM CARBONATE, FERROUS FUMARATE, ZINC OXIDE, AND CUPRIC OXIDE 2000; 2000; 120; 400; 22; 1.84; 3; 20; 10; 1; 12; 200; 27; 25; 2 [IU]/1; [IU]/1; MG/1; [IU]/1; MG/1; MG/1; MG/1; MG/1; MG/1; MG/1; UG/1; MG/1; MG/1; MG/1; MG/1
TABLET ORAL
Qty: 90 TABLET | Refills: 3 | Status: SHIPPED | OUTPATIENT
Start: 2020-08-25 | End: 2021-07-09

## 2020-08-25 NOTE — PROGRESS NOTES
Nexplanon Removal    Date of procedure:  8/25/2020    Risks and benefits discussed? yes  All questions answered? yes  Consents given by the patient  Written consent obtained? yes  Reason for removal: Desires pregnancy    Local anesthesia used:  yes - 0.5 cc's of local anesthesia: 1% lidocaine with epinephrine    Procedure documentation:    The upper left arm was marked at the intended site of removal.  Betadine was used to cleanse the skin.  Local anesthesia was injected.  A vertical incision was created at the distal tip of the implant.  The implant was removed intact without difficulty.  Steri-strips were then placed across the site of insertion and the arm was wrapped.    She tolerated the procedure well.  There were no complications.  EBL was minimal.    Post procedure instructions: Remove the wrapping in 24 hours and the steri-strips in 5 days.    Follow up needed: PRN    This note was electronically signed.    Ada Mata PA-C.  August 25, 2020

## 2021-03-15 ENCOUNTER — TELEPHONE (OUTPATIENT)
Dept: OBSTETRICS AND GYNECOLOGY | Facility: CLINIC | Age: 31
End: 2021-03-15

## 2021-03-15 ENCOUNTER — LAB (OUTPATIENT)
Dept: LAB | Facility: HOSPITAL | Age: 31
End: 2021-03-15

## 2021-03-15 DIAGNOSIS — O02.1 MISSED ABORTION: Primary | ICD-10-CM

## 2021-03-15 DIAGNOSIS — O02.1 MISSED ABORTION: ICD-10-CM

## 2021-03-15 LAB — HCG INTACT+B SERPL-ACNC: 119.6 MIU/ML

## 2021-03-15 PROCEDURE — 36415 COLL VENOUS BLD VENIPUNCTURE: CPT

## 2021-03-15 PROCEDURE — 84702 CHORIONIC GONADOTROPIN TEST: CPT

## 2021-03-15 NOTE — TELEPHONE ENCOUNTER
----- Message from Radha Coles sent at 3/15/2021  9:06 AM EDT -----  Pt thinks she miscarried last mth but is still having + preg tests.     Can an order be added for labs?  Please advise pt.

## 2021-03-17 ENCOUNTER — LAB (OUTPATIENT)
Dept: LAB | Facility: HOSPITAL | Age: 31
End: 2021-03-17

## 2021-03-17 DIAGNOSIS — O02.1 MISSED ABORTION: ICD-10-CM

## 2021-03-17 DIAGNOSIS — O02.1 MISSED ABORTION: Primary | ICD-10-CM

## 2021-03-17 LAB — HCG INTACT+B SERPL-ACNC: 90.66 MIU/ML

## 2021-03-17 PROCEDURE — 36415 COLL VENOUS BLD VENIPUNCTURE: CPT

## 2021-03-17 PROCEDURE — 84702 CHORIONIC GONADOTROPIN TEST: CPT

## 2021-07-08 NOTE — PROGRESS NOTES
"    Chief Complaint   Patient presents with   • Initial Prenatal Visit     LMP 21, TVS done today 7w4d, last pap 07/10/18 WNL. Nausea        HPI  , 7w3d presents to our office today for initial prenatal visit.    She reports:  Voiced mild 1st trimester discomforts of pregnancy specifically Nausea and fatigue.  Presently working from home & plans to continue.    Taking PNV and tolerating     Previous pregnancies - C/S's at term x 3  - Reports G1 preeclampsia        Current Outpatient Medications:   •  Prenatal Vit-Fe Fumarate-FA (PNV PRENATAL PLUS MULTIVITAMIN) 27-1 MG tablet, One tablet po daily, Disp: 90 tablet, Rfl: 3  Past Medical History:   Diagnosis Date   • Patient denies medical problems    • Positive testing for group B Streptococcus 2018   • Preeclampsia    • Varicella       Past Surgical History:   Procedure Laterality Date   •  SECTION  ,     x's 2   •  SECTION WITH TUBAL N/A 2018    Procedure:  SECTION REPEAT;  Surgeon: Swetha Hernandez MD;  [facility];  Service: Obstetrics/Gynecology   • KNEE ACL RECONSTRUCTION     • WISDOM TOOTH EXTRACTION       Social History     Socioeconomic History   • Marital status:      Spouse name: Not on file   • Number of children: Not on file   • Years of education: Not on file   • Highest education level: Not on file   Tobacco Use   • Smoking status: Never Smoker   • Smokeless tobacco: Never Used   Vaping Use   • Vaping Use: Never used   Substance and Sexual Activity   • Alcohol use: No   • Drug use: No   • Sexual activity: Yes     Partners: Male     Birth control/protection: Implant     Comment: Plans bilateral tubal ligation         Allergies   Allergen Reactions   • Mastisol [Wound Dressing Adhesive] Rash     \"a terrible fire burning, blistered rash\"   • Other Rash     Steri-strips          Family History   Problem Relation Age of Onset   • Prostate cancer Paternal Grandfather    • " Cancer Paternal Grandmother    • Lung cancer Maternal Grandmother    • Prostate cancer Maternal Grandfather        The following portions of the patient's history were reviewed as note above and updated   current medications allergies, past family history, past medical history, past social history and past surgical history.    ROS  Pertinent items are noted in HPI, all other systems reviewed and negative    Physical Exam  /92   Wt 96.2 kg (212 lb)   LMP 05/18/2021   BMI 34.22 kg/m²      Psych: Alert and oriented to time, place and person  Mood and affect appropriate   General: pleasant   Musculoskeletal: Normal gait, Full range of motion  Head: normocephalic  Neck: The neck is supple and the trachea is midline  Back: neg CVAT  Lungs:  breathing is unlabored  Abdomen: Soft, non-tender, no organomegaly  Lower Extremities: normal   Genitourinary: deferred       MDM  Impression:  Problems/Risks: High Risk Pregnancy  hx C/S x 3 - plans for permanent sterilization    Mildly elevated BP today   Hx PreE with G1   Tests done today: Routine NOB labs with CMP & TSH   option for CF screening   TVS rev'd - 7 4/7 wks   - SANDRINE    Topics discussed: NOB education included nutrition and exercise  Pregnancy OTC med sheet provided  Genetic screening, CF, NIPS after 9 wks  Will repeat BP 1-2 wks and pap then   adeq rest and fluids   encouraged questions - call prn           Written info provided:   SANDRINE optional    Tests next visit: pap and option for NIPS

## 2021-07-09 ENCOUNTER — INITIAL PRENATAL (OUTPATIENT)
Dept: OBSTETRICS AND GYNECOLOGY | Facility: CLINIC | Age: 31
End: 2021-07-09

## 2021-07-09 VITALS — SYSTOLIC BLOOD PRESSURE: 128 MMHG | DIASTOLIC BLOOD PRESSURE: 92 MMHG | BODY MASS INDEX: 34.22 KG/M2 | WEIGHT: 212 LBS

## 2021-07-09 DIAGNOSIS — Z3A.08 8 WEEKS GESTATION OF PREGNANCY: ICD-10-CM

## 2021-07-09 DIAGNOSIS — O36.80X0 ENCOUNTER TO DETERMINE FETAL VIABILITY OF PREGNANCY, SINGLE OR UNSPECIFIED FETUS: Primary | ICD-10-CM

## 2021-07-09 PROCEDURE — 0501F PRENATAL FLOW SHEET: CPT | Performed by: NURSE PRACTITIONER

## 2021-07-09 RX ORDER — PRENATAL WITH FERROUS FUM AND FOLIC ACID 3080; 920; 120; 400; 22; 1.84; 3; 20; 10; 1; 12; 200; 27; 25; 2 [IU]/1; [IU]/1; MG/1; [IU]/1; MG/1; MG/1; MG/1; MG/1; MG/1; MG/1; UG/1; MG/1; MG/1; MG/1; MG/1
1 TABLET ORAL DAILY
Qty: 30 TABLET | Refills: 8 | Status: SHIPPED | OUTPATIENT
Start: 2021-07-09 | End: 2021-08-20 | Stop reason: SDUPTHER

## 2021-07-09 NOTE — PATIENT INSTRUCTIONS
Subchorionic Hematoma    A subchorionic hematoma is a gathering of blood between the outer wall of the embryo (chorion) and the inner wall of the womb (uterus).  This condition can cause vaginal bleeding. If they cause little or no vaginal bleeding, early small hematomas usually shrink on their own and do not affect your baby or pregnancy. When bleeding starts later in pregnancy, or if the hematoma is larger or occurs in older pregnant women, the condition may be more serious. Larger hematomas may get bigger, which increases the chances of miscarriage. This condition also increases the risk of:  · Premature separation of the placenta from the uterus.  · Premature () labor.  · Stillbirth.  What are the causes?  The exact cause of this condition is not known. It occurs when blood is trapped between the placenta and the uterine wall because the placenta has  from the original site of implantation.  What increases the risk?  You are more likely to develop this condition if:  · You were treated with fertility medicines.  · You conceived through in vitro fertilization (IVF).  What are the signs or symptoms?  Symptoms of this condition include:  · Vaginal spotting or bleeding.  · Contractions of the uterus. These cause abdominal pain.  Sometimes you may have no symptoms and the bleeding may only be seen when ultrasound images are taken (transvaginal ultrasound).  How is this diagnosed?  This condition is diagnosed based on a physical exam. This includes a pelvic exam. You may also have other tests, including:  · Blood tests.  · Urine tests.  · Ultrasound of the abdomen.  How is this treated?  Treatment for this condition can vary. Treatment may include:  · Watchful waiting. You will be monitored closely for any changes in bleeding. During this stage:  ? The hematoma may be reabsorbed by the body.  ? The hematoma may separate the fluid-filled space containing the embryo (gestational sac) from the wall of the  womb (endometrium).  · Medicines.  · Activity restriction. This may be needed until the bleeding stops.  Follow these instructions at home:  · Stay on bed rest if told to do so by your health care provider.  · Do not lift anything that is heavier than 10 lbs. (4.5 kg) or as told by your health care provider.  · Do not use any products that contain nicotine or tobacco, such as cigarettes and e-cigarettes. If you need help quitting, ask your health care provider.  · Track and write down the number of pads you use each day and how soaked (saturated) they are.  · Do not use tampons.  · Keep all follow-up visits as told by your health care provider. This is important. Your health care provider may ask you to have follow-up blood tests or ultrasound tests or both.  Contact a health care provider if:  · You have any vaginal bleeding.  · You have a fever.  Get help right away if:  · You have severe cramps in your stomach, back, abdomen, or pelvis.  · You pass large clots or tissue. Save any tissue for your health care provider to look at.  · You have more vaginal bleeding, and you faint or become lightheaded or weak.  Summary  · A subchorionic hematoma is a gathering of blood between the outer wall of the placenta and the uterus.  · This condition can cause vaginal bleeding.  · Sometimes you may have no symptoms and the bleeding may only be seen when ultrasound images are taken.  · Treatment may include watchful waiting, medicines, or activity restriction.  This information is not intended to replace advice given to you by your health care provider. Make sure you discuss any questions you have with your health care provider.  Document Revised: 11/30/2018 Document Reviewed: 02/13/2018  [employer] Patient Education © 2021 [employer]

## 2021-07-11 LAB
ABO GROUP BLD: NORMAL
ALBUMIN SERPL-MCNC: 4.4 G/DL (ref 3.5–5.2)
ALBUMIN/GLOB SERPL: 1.8 G/DL
ALP SERPL-CCNC: 60 U/L (ref 39–117)
ALT SERPL-CCNC: 12 U/L (ref 1–33)
AST SERPL-CCNC: 11 U/L (ref 1–32)
BACTERIA UR CULT: NORMAL
BACTERIA UR CULT: NORMAL
BASOPHILS # BLD AUTO: 0 X10E3/UL (ref 0–0.2)
BASOPHILS NFR BLD AUTO: 1 %
BILIRUB SERPL-MCNC: 0.4 MG/DL (ref 0–1.2)
BLD GP AB SCN SERPL QL: NEGATIVE
BUN SERPL-MCNC: 7 MG/DL (ref 6–20)
BUN/CREAT SERPL: 10.6 (ref 7–25)
CALCIUM SERPL-MCNC: 9.1 MG/DL (ref 8.6–10.5)
CHLORIDE SERPL-SCNC: 103 MMOL/L (ref 98–107)
CO2 SERPL-SCNC: 21.6 MMOL/L (ref 22–29)
CREAT SERPL-MCNC: 0.66 MG/DL (ref 0.57–1)
EOSINOPHIL # BLD AUTO: 0.2 X10E3/UL (ref 0–0.4)
EOSINOPHIL NFR BLD AUTO: 3 %
ERYTHROCYTE [DISTWIDTH] IN BLOOD BY AUTOMATED COUNT: 13.1 % (ref 11.7–15.4)
GLOBULIN SER CALC-MCNC: 2.4 GM/DL
GLUCOSE SERPL-MCNC: 84 MG/DL (ref 65–99)
HBV SURFACE AG SERPL QL IA: NEGATIVE
HCT VFR BLD AUTO: 43.7 % (ref 34–46.6)
HCV AB S/CO SERPL IA: <0.1 S/CO RATIO (ref 0–0.9)
HGB BLD-MCNC: 14.6 G/DL (ref 11.1–15.9)
HIV 1+2 AB+HIV1 P24 AG SERPL QL IA: NON REACTIVE
IMM GRANULOCYTES # BLD AUTO: 0 X10E3/UL (ref 0–0.1)
IMM GRANULOCYTES NFR BLD AUTO: 0 %
LYMPHOCYTES # BLD AUTO: 1.3 X10E3/UL (ref 0.7–3.1)
LYMPHOCYTES NFR BLD AUTO: 20 %
MCH RBC QN AUTO: 30 PG (ref 26.6–33)
MCHC RBC AUTO-ENTMCNC: 33.4 G/DL (ref 31.5–35.7)
MCV RBC AUTO: 90 FL (ref 79–97)
MONOCYTES # BLD AUTO: 0.5 X10E3/UL (ref 0.1–0.9)
MONOCYTES NFR BLD AUTO: 8 %
NEUTROPHILS # BLD AUTO: 4.6 X10E3/UL (ref 1.4–7)
NEUTROPHILS NFR BLD AUTO: 68 %
PLATELET # BLD AUTO: 355 X10E3/UL (ref 150–450)
POTASSIUM SERPL-SCNC: 4.4 MMOL/L (ref 3.5–5.2)
PROT SERPL-MCNC: 6.8 G/DL (ref 6–8.5)
RBC # BLD AUTO: 4.86 X10E6/UL (ref 3.77–5.28)
RH BLD: POSITIVE
RPR SER QL: NON REACTIVE
RUBV IGG SERPL IA-ACNC: 1.05 INDEX
SODIUM SERPL-SCNC: 136 MMOL/L (ref 136–145)
T4 FREE SERPL-MCNC: 1.41 NG/DL (ref 0.93–1.7)
TSH SERPL DL<=0.005 MIU/L-ACNC: 2.65 UIU/ML (ref 0.27–4.2)
WBC # BLD AUTO: 6.6 X10E3/UL (ref 3.4–10.8)

## 2021-07-23 ENCOUNTER — ROUTINE PRENATAL (OUTPATIENT)
Dept: OBSTETRICS AND GYNECOLOGY | Facility: CLINIC | Age: 31
End: 2021-07-23

## 2021-07-23 VITALS — SYSTOLIC BLOOD PRESSURE: 142 MMHG | DIASTOLIC BLOOD PRESSURE: 80 MMHG | WEIGHT: 211 LBS | BODY MASS INDEX: 34.06 KG/M2

## 2021-07-23 DIAGNOSIS — Z98.891 HX OF CESAREAN SECTION: ICD-10-CM

## 2021-07-23 DIAGNOSIS — Z34.81 ENCOUNTER FOR SUPERVISION OF OTHER NORMAL PREGNANCY IN FIRST TRIMESTER: Primary | ICD-10-CM

## 2021-07-23 PROCEDURE — 0502F SUBSEQUENT PRENATAL CARE: CPT | Performed by: MIDWIFE

## 2021-07-23 NOTE — PROGRESS NOTES
Chief Complaint   Patient presents with   • Routine Prenatal Visit     Patient c/o nausea       HPI: Nelsy is a  currently at 9w3d who today reports the following:  Some nausea but minimal vomiting. She didn't have issues with her BP during prior pregnancies.  She wants to do genetics testing.                EXAM:     Vitals:    21 1459   BP: 142/80      Abdomen:   See prenatal flowsheet as noted and reviewed, soft, nontender   Pelvic:  See prenatal flowsheet as noted and reviewed   Urine:  See prenatal flowsheet as noted and reviewed    Lab Results   Component Value Date    ABO O 2021    RH Positive 2021    ABSCRN Negative 2021       MDM:  Impression: Supervision of normal pregnancy  Previous C/S with planned repeat C/S   Tests done today: none   Topics discussed: PIH precautions   Reviewed OB labs   Tests next visit: MaterniTi 21                RTO:                        4 weeks    This note was electronically signed.  Bailey Larry, APRN  2021

## 2021-08-20 ENCOUNTER — ROUTINE PRENATAL (OUTPATIENT)
Dept: OBSTETRICS AND GYNECOLOGY | Facility: CLINIC | Age: 31
End: 2021-08-20

## 2021-08-20 VITALS — BODY MASS INDEX: 33.89 KG/M2 | DIASTOLIC BLOOD PRESSURE: 80 MMHG | WEIGHT: 210 LBS | SYSTOLIC BLOOD PRESSURE: 132 MMHG

## 2021-08-20 DIAGNOSIS — Z98.891 HX OF CESAREAN SECTION: ICD-10-CM

## 2021-08-20 DIAGNOSIS — O21.9 NAUSEA AND VOMITING DURING PREGNANCY PRIOR TO 22 WEEKS GESTATION: ICD-10-CM

## 2021-08-20 DIAGNOSIS — K21.9 GASTROESOPHAGEAL REFLUX DISEASE WITHOUT ESOPHAGITIS: ICD-10-CM

## 2021-08-20 DIAGNOSIS — O09.299 HX OF PREECLAMPSIA, PRIOR PREGNANCY, CURRENTLY PREGNANT: ICD-10-CM

## 2021-08-20 DIAGNOSIS — O09.91 ENCOUNTER FOR SUPERVISION OF HIGH RISK PREGNANCY IN FIRST TRIMESTER, ANTEPARTUM: Primary | ICD-10-CM

## 2021-08-20 PROCEDURE — 0502F SUBSEQUENT PRENATAL CARE: CPT | Performed by: OBSTETRICS & GYNECOLOGY

## 2021-08-20 RX ORDER — FAMOTIDINE 20 MG/1
20 TABLET, FILM COATED ORAL 2 TIMES DAILY
Qty: 60 TABLET | Refills: 5 | Status: SHIPPED | OUTPATIENT
Start: 2021-08-20 | End: 2022-03-21

## 2021-08-20 RX ORDER — PRENATAL WITH FERROUS FUM AND FOLIC ACID 3080; 920; 120; 400; 22; 1.84; 3; 20; 10; 1; 12; 200; 27; 25; 2 [IU]/1; [IU]/1; MG/1; [IU]/1; MG/1; MG/1; MG/1; MG/1; MG/1; MG/1; UG/1; MG/1; MG/1; MG/1; MG/1
1 TABLET ORAL DAILY
Qty: 90 TABLET | Refills: 3 | Status: SHIPPED | OUTPATIENT
Start: 2021-08-20 | End: 2022-05-17

## 2021-08-20 RX ORDER — ASPIRIN 81 MG/1
81 TABLET ORAL DAILY
Qty: 90 TABLET | Refills: 2 | Status: SHIPPED | OUTPATIENT
Start: 2021-08-20 | End: 2022-03-21

## 2021-08-20 NOTE — PROGRESS NOTES
Chief Complaint  Routine Prenatal Visit (doing well)    History of Present Illness:  Nelsy is a  currently at 13w3d who presents today with no complaints other than nausea and emesis.  Patient reports she had improvement in her symptoms for 1 to 2 weeks.  Patient reports her symptoms have returned.  Patient reports they are predominantly at night or early a.m.  Patient does have occasional reflux.  Patient has not been taking any medications.  Patient does have a history of preeclampsia with her first pregnancy.  Patient does not monitor her blood pressures at home.  She reports having an occasional headache which is relieved with Tylenol.  She denies any visual disturbances.  Patient did have genetic screening last visit as noted.    Exam:  Vitals:  See prenatal flowsheet as noted and reviewed  General: Alert, cooperative, and does not appear in any distress  Abdomen:   See prenatal flowsheet as noted and reviewed    Uterus gravid, non-tender; no palpable masses    No guarding or rebound tenderness  Pelvic:  See prenatal flowsheet as noted and reviewed  Ext:  See prenatal flowsheet as noted and reviewed    Moves extremities well, no cyanosis and no redness  Urine:  See prenatal flowsheet as noted and reviewed    Data Review:  The following data was reviewed by: Swetha Hernandez MD on 2021:  Prenatal Labs:  Lab Results   Component Value Date    HGB 14.6 2021    RUBELLAABIGG 1.05 2021    HEPBSAG Negative 2021    ABO O 2021    RH Positive 2021    ABSCRN Negative 2021    [id number] Non Reactive 2021    HEPCVIRUSABY <0.1 2021    STREPGPB Positive (A) 2018    URINECX Final report 2021       Results Encounter on 2021   Component Date Value   • Gestation 2021 Patel    • Fetal Fraction 2021 6%    • Gestational Age >9: 2021 Yes    • Result 2021 Negative    •  Comments 2021 Comment    • Approved By  2021 Comment    • TRISOMY 21 (DOWN SYNDROM* 2021 Negative    • TRISOMY 18 (CEE SYND* 2021 Negative    • TRISOMY 13 (PATAU SYNDRO* 2021 Negative    • FETAL SEX 2021 Comment    • MONOSOMY X (MATIAS SYNDR* 2021 Not Detected    • XYY (FRANKLIN SYNDROME) 2021 Not Detected    • XXY (KLINEFELTER SYNDROM* 2021 Not Detected    • XXX (TRIPLE X SYNDROME) 2021 Not Detected    • NEGATIVE PREDICTIVE VALUE 2021 Note    • POSITIVE PREDICTIVE VALUE 2021 N/A    • About The Test 2021 Comment    • Test Method 2021 Comment    • Performance 2021 Comment    • PERFORMANCE CHARACTERIST* 2021 Note    • Limitations of the Test 2021 Comment    • Note 2021 Comment    • References 2021 Comment      Imaging:  US Ob Transvaginal  Nelsy Bill  : 1990  MRN: [id number]  Date: 2021    Reason for exam/History: Dates    Ultrasound images are reviewed.  An intrauterine pregnancy is noted.  A   gestational sac is seen.  A yolk sac is noted.  The pregnancy measures 7   weeks 4 days gestation.  Cardiac activity is noted.  The fetal heart rate   measures 141 beats per minute.  There appears to be a 2.7 cm subchorionic   hematoma seen near the cervix.  The adnexa was noted to be abnormal in   appearance.  The right ovary has multiple follicles.  The left ovary has a   2.3 cm simple appearing cyst.  There was normal vascular flow. There is no   free fluid noted.    The exam limitations noted:  none    See ultrasound report for measurements and structures identified.    Swetha Hernandez MD, RDMS  [facility]  OB GYN [place]    Medical Records:  None    Assessment and Plan:  Problem List Items Addressed This Visit     None      Visit Diagnoses     Encounter for supervision of high risk pregnancy in first trimester, antepartum    -  Primary  Topics discussed:     ab precautions  genetic screening  GERD management  kick  counts and fetal movement  PIH precautions  Patient to consider MSAFP next visit if desired.    Relevant Medications    Prenatal Vit-Fe Fumarate-FA (Prenatal 27-1) 27-1 MG tablet tablet    Hx of  section        Hx of preeclampsia, prior pregnancy, currently pregnant      Prescription is given for aspirin as noted.  Instructions and precautions have been given.    Relevant Medications    aspirin (aspirin) 81 MG EC tablet    Nausea and vomiting during pregnancy prior to 22 weeks gestation      Prescriptions given for Pepcid is noted.  Patient is instructed to take twice daily.  Patient is to call if no improvement in her symptoms.    Relevant Medications    famotidine (PEPCID) 20 MG tablet    Gastroesophageal reflux disease without esophagitis        Relevant Medications    famotidine (PEPCID) 20 MG tablet        Follow Up/Instructions:  Follow up as scheduled.  Patient was given instructions and counseling regarding her condition or for health maintenance advice. Please see specific information pulled into the AVS if appropriate.     Note: Speech recognition transcription software may have been used to dictate portions of this document.  An attempt at proofreading has been made though minor errors in transcription may still be present.    This note was electronically signed.  Swetha Hernandez M.D.

## 2021-09-17 ENCOUNTER — ROUTINE PRENATAL (OUTPATIENT)
Dept: OBSTETRICS AND GYNECOLOGY | Facility: CLINIC | Age: 31
End: 2021-09-17

## 2021-09-17 VITALS — BODY MASS INDEX: 34.22 KG/M2 | WEIGHT: 212 LBS | DIASTOLIC BLOOD PRESSURE: 78 MMHG | SYSTOLIC BLOOD PRESSURE: 132 MMHG

## 2021-09-17 DIAGNOSIS — Z34.82 ENCOUNTER FOR SUPERVISION OF OTHER NORMAL PREGNANCY IN SECOND TRIMESTER: Primary | ICD-10-CM

## 2021-09-17 PROCEDURE — 0502F SUBSEQUENT PRENATAL CARE: CPT | Performed by: MIDWIFE

## 2021-09-17 NOTE — PROGRESS NOTES
Chief Complaint   Patient presents with   • Routine Prenatal Visit     Patient c/o nausea       HPI: Nelsy is a  currently at 17w3d who today reports the following:  She is feeling flutters. She has been having some mild nausea. She is taking Pepcid as needed, she has more heartburn at night. She is considering the Covid vaccine.                EXAM:     Vitals:    21 1104   BP: 132/78      Abdomen:   See prenatal flowsheet as noted and reviewed, soft, nontender, Fundus @ U   Pelvic:  See prenatal flowsheet as noted and reviewed   Urine:  See prenatal flowsheet as noted and reviewed    Lab Results   Component Value Date    ABO O 2021    RH Positive 2021    ABSCRN Negative 2021       MDM:  Impression: Supervision of low risk pregnancy  Previous C/S with planned repeat C/S   Tests done today: Declines genetic screening   Topics discussed: covid vaccination info given  kick counts and fetal movement   Tests next visit: U/S for anatomic screening                RTO:                        1 weeks    This note was electronically signed.  Bailey Larry, JULIANNE  2021

## 2021-09-22 ENCOUNTER — ROUTINE PRENATAL (OUTPATIENT)
Dept: OBSTETRICS AND GYNECOLOGY | Facility: CLINIC | Age: 31
End: 2021-09-22

## 2021-09-22 VITALS — BODY MASS INDEX: 34.22 KG/M2 | DIASTOLIC BLOOD PRESSURE: 90 MMHG | SYSTOLIC BLOOD PRESSURE: 152 MMHG | WEIGHT: 212 LBS

## 2021-09-22 DIAGNOSIS — O09.299 HX OF PREECLAMPSIA, PRIOR PREGNANCY, CURRENTLY PREGNANT: ICD-10-CM

## 2021-09-22 DIAGNOSIS — Z98.891 PREVIOUS CESAREAN SECTION: ICD-10-CM

## 2021-09-22 DIAGNOSIS — Z34.92 PRENATAL CARE IN SECOND TRIMESTER: Primary | ICD-10-CM

## 2021-09-22 DIAGNOSIS — O10.919 HTN IN PREGNANCY, CHRONIC: ICD-10-CM

## 2021-09-22 DIAGNOSIS — Z36.89 ENCOUNTER FOR FETAL ANATOMIC SURVEY: ICD-10-CM

## 2021-09-22 PROCEDURE — 0502F SUBSEQUENT PRENATAL CARE: CPT | Performed by: OBSTETRICS & GYNECOLOGY

## 2021-09-22 NOTE — PROGRESS NOTES
Prenatal Care Visit    Subjective   Chief Complaint   Patient presents with   • Routine Prenatal Visit     Anatomy scan done today, BP check.       History:   Nelsy is a  currently at 18w1d who presents for a prenatal care visit today.    No problems. No headaches or vision changes.    Social History    Tobacco Use      Smoking status: Never Smoker      Smokeless tobacco: Never Used       Objective   /90   Wt 96.2 kg (212 lb)   LMP 2021   BMI 34.22 kg/m²   Physical Exam:  Normal, gestational age-appropriate exam today        Plan   Medical Decision Making:    I have reviewed the prenatal labs and ultrasound(s) today. I have reviewed the most recent prenatal progress note(s).    Diagnosis: Supervision of high risk pregnancy  Chronic HTN in pregnancy  Previous C/S with planned repeat C/S, no meds  Probable BTL  GERD  H/O PreE   Tests/Orders/Rx today: No orders of the defined types were placed in this encounter.      Medication Management: None     Topics discussed: Prenatal care milestones  HTN - Continue to monitor with no blood pressure medication for now.  Check BPs at home.  Continue daily aspirin 81 mg.  Call/return precautions reviewed.  Tubal r/b/a   Tests next visit: none   Next visit: 4 week(s)     Arturo Rodriguez MD  Obstetrics and Gynecology  [facility]

## 2021-09-30 ENCOUNTER — ROUTINE PRENATAL (OUTPATIENT)
Dept: OBSTETRICS AND GYNECOLOGY | Facility: CLINIC | Age: 31
End: 2021-09-30

## 2021-09-30 VITALS — SYSTOLIC BLOOD PRESSURE: 158 MMHG | DIASTOLIC BLOOD PRESSURE: 92 MMHG | BODY MASS INDEX: 34.54 KG/M2 | WEIGHT: 214 LBS

## 2021-09-30 DIAGNOSIS — O10.919 HTN IN PREGNANCY, CHRONIC: ICD-10-CM

## 2021-09-30 DIAGNOSIS — Z98.891 PREVIOUS CESAREAN SECTION: ICD-10-CM

## 2021-09-30 DIAGNOSIS — Z34.92 PRENATAL CARE IN SECOND TRIMESTER: Primary | ICD-10-CM

## 2021-09-30 DIAGNOSIS — R21 RASH OF FACE: ICD-10-CM

## 2021-09-30 DIAGNOSIS — O09.299 HX OF PREECLAMPSIA, PRIOR PREGNANCY, CURRENTLY PREGNANT: ICD-10-CM

## 2021-09-30 PROCEDURE — 0502F SUBSEQUENT PRENATAL CARE: CPT | Performed by: OBSTETRICS & GYNECOLOGY

## 2021-09-30 RX ORDER — LABETALOL 200 MG/1
200 TABLET, FILM COATED ORAL 2 TIMES DAILY
Qty: 60 TABLET | Refills: 5 | Status: SHIPPED | OUTPATIENT
Start: 2021-09-30 | End: 2021-12-28

## 2021-09-30 NOTE — PROGRESS NOTES
Prenatal Care Visit    Subjective   Chief Complaint   Patient presents with   • Routine Prenatal Visit     Rash around mouth, lips are swollen       History:   Nelsy is a  currently at 18w1d who presents for a prenatal care visit today.    Rash around mouth with lip swelling and itching for several days. No headaches or vision changes. No fevers or chills.    Social History    Tobacco Use      Smoking status: Never Smoker      Smokeless tobacco: Never Used       Objective   /92   Wt 97.1 kg (214 lb)   LMP 2021   BMI 34.54 kg/m²   Physical Exam:  Normal, gestational age-appropriate exam today        Plan   Medical Decision Making:    I have reviewed the prenatal labs and ultrasound(s) today. I have reviewed the most recent prenatal progress note(s).    Diagnosis: Supervision of high risk pregnancy  Chronic HTN in pregnancy  Previous C/S with planned repeat C/S  Probable BTL  GERD  H/O PreE  Facial rash and lip swelling, unclear etiology   Tests/Orders/Rx today: Orders Placed This Encounter   Procedures   • Protein / Creatinine Ratio, Urine - Urine, Clean Catch     Order Specific Question:   Release to patient     Answer:   Immediate       Medication Management: Labetalol 200 mg BID     Topics discussed: Prenatal care milestones  HTN - Start Labetalol as above.  Check BPs at home.  Continue daily aspirin 81 mg.  Call/return precautions reviewed.  Rash - unclear etiology, we discussed steroids (prednisone) if symptoms persist   Tests next visit: BP check   Next visit: 1 week(s)     Arturo Rodriguez MD  Obstetrics and Gynecology  [facility]

## 2021-10-01 LAB
CREAT UR-MCNC: 74.5 MG/DL
PROT UR-MCNC: 21.4 MG/DL
PROT/CREAT UR: 287.2 MG/G CREA (ref 0–200)

## 2021-10-07 ENCOUNTER — ROUTINE PRENATAL (OUTPATIENT)
Dept: OBSTETRICS AND GYNECOLOGY | Facility: CLINIC | Age: 31
End: 2021-10-07

## 2021-10-07 VITALS — SYSTOLIC BLOOD PRESSURE: 130 MMHG | BODY MASS INDEX: 34.54 KG/M2 | WEIGHT: 214 LBS | DIASTOLIC BLOOD PRESSURE: 70 MMHG

## 2021-10-07 DIAGNOSIS — O09.299 HX OF PREECLAMPSIA, PRIOR PREGNANCY, CURRENTLY PREGNANT: Primary | ICD-10-CM

## 2021-10-07 PROCEDURE — 0502F SUBSEQUENT PRENATAL CARE: CPT | Performed by: OBSTETRICS & GYNECOLOGY

## 2021-10-07 NOTE — PROGRESS NOTES
Chief Complaint   Patient presents with   • Routine Prenatal Visit         HPI:   , 20w2d gestation reports doing well    ROS:  See Prenatal Episode/Flowsheet  /70   Wt 97.1 kg (214 lb)   LMP 2021   BMI 34.54 kg/m²      EXAM:  EXTREMITIES:  No swelling-See Prenatal Episode/Flowsheet    ABDOMEN:  FHTs/Movement noted-See Prenatal Episode/Flowsheet    URINE GLUCOSE/PROTEIN:  See Prenatal Episode/Flowsheet    PELVIC EXAM:  See Prenatal Episode/Flowsheet  CV:  Lungs:  GYN:    MDM:    Lab Results   Component Value Date    HGB 14.6 2021    RUBELLAABIGG 1.05 2021    HEPBSAG Negative 2021    ABO O 2021    RH Positive 2021    ABSCRN Negative 2021    [id number] Non Reactive 2021    HEPCVIRUSABY <0.1 2021    STREPGPB Positive (A) 2018    URINECX Final report 2021       U/S:    1. IUP 20w2d  2. Routine care   3.  Blood pressure is reasonable today.  Patient never started her labetalol as she has a cuff at home is been monitoring and her pressures have not been elevated.  She is a history of preeclampsia with G1 but not with the 2 subsequent pregnancies.  She is taking a baby aspirin.  Her protein creatinine ratio was 287.  4. Glucola CBC next time

## 2021-10-26 ENCOUNTER — ROUTINE PRENATAL (OUTPATIENT)
Dept: OBSTETRICS AND GYNECOLOGY | Facility: CLINIC | Age: 31
End: 2021-10-26

## 2021-10-26 VITALS — SYSTOLIC BLOOD PRESSURE: 128 MMHG | DIASTOLIC BLOOD PRESSURE: 70 MMHG | WEIGHT: 218.6 LBS | BODY MASS INDEX: 35.28 KG/M2

## 2021-10-26 DIAGNOSIS — Z34.92 SECOND TRIMESTER PREGNANCY: Primary | ICD-10-CM

## 2021-10-26 LAB
BASOPHILS # BLD AUTO: 0.02 10*3/MM3 (ref 0–0.2)
BASOPHILS NFR BLD AUTO: 0.2 % (ref 0–1.5)
EOSINOPHIL # BLD AUTO: 0.17 10*3/MM3 (ref 0–0.4)
EOSINOPHIL NFR BLD AUTO: 1.7 % (ref 0.3–6.2)
ERYTHROCYTE [DISTWIDTH] IN BLOOD BY AUTOMATED COUNT: 13.9 % (ref 12.3–15.4)
GLUCOSE 1H P 50 G GLC PO SERPL-MCNC: 115 MG/DL (ref 65–139)
HCT VFR BLD AUTO: 39.4 % (ref 34–46.6)
HGB BLD-MCNC: 13.3 G/DL (ref 12–15.9)
IMM GRANULOCYTES # BLD AUTO: 0.07 10*3/MM3 (ref 0–0.05)
IMM GRANULOCYTES NFR BLD AUTO: 0.7 % (ref 0–0.5)
LYMPHOCYTES # BLD AUTO: 1.45 10*3/MM3 (ref 0.7–3.1)
LYMPHOCYTES NFR BLD AUTO: 14.7 % (ref 19.6–45.3)
MCH RBC QN AUTO: 30.9 PG (ref 26.6–33)
MCHC RBC AUTO-ENTMCNC: 33.8 G/DL (ref 31.5–35.7)
MCV RBC AUTO: 91.4 FL (ref 79–97)
MONOCYTES # BLD AUTO: 0.6 10*3/MM3 (ref 0.1–0.9)
MONOCYTES NFR BLD AUTO: 6.1 % (ref 5–12)
NEUTROPHILS # BLD AUTO: 7.55 10*3/MM3 (ref 1.7–7)
NEUTROPHILS NFR BLD AUTO: 76.6 % (ref 42.7–76)
NRBC BLD AUTO-RTO: 0 /100 WBC (ref 0–0.2)
PLATELET # BLD AUTO: 289 10*3/MM3 (ref 140–450)
RBC # BLD AUTO: 4.31 10*6/MM3 (ref 3.77–5.28)
WBC # BLD AUTO: 9.86 10*3/MM3 (ref 3.4–10.8)

## 2021-10-26 PROCEDURE — 0502F SUBSEQUENT PRENATAL CARE: CPT | Performed by: OBSTETRICS & GYNECOLOGY

## 2021-10-26 NOTE — PROGRESS NOTES
Chief Complaint   Patient presents with   • Routine Prenatal Visit     Glucola. Patient states she is doing well, no complaints.        HPI:   , 23w0d gestation reports doing well    ROS:  See Prenatal Episode/Flowsheet  /70   Wt 99.2 kg (218 lb 9.6 oz)   LMP 2021   BMI 35.28 kg/m²      EXAM:  EXTREMITIES:  No swelling-See Prenatal Episode/Flowsheet    ABDOMEN:  FHTs/Movement noted-See Prenatal Episode/Flowsheet    URINE GLUCOSE/PROTEIN:  See Prenatal Episode/Flowsheet    PELVIC EXAM:  See Prenatal Episode/Flowsheet  CV:  Lungs:  GYN:    MDM:    Lab Results   Component Value Date    HGB 14.6 2021    RUBELLAABIGG 1.05 2021    HEPBSAG Negative 2021    ABO O 2021    RH Positive 2021    ABSCRN Negative 2021    [id number] Non Reactive 2021    HEPCVIRUSABY <0.1 2021    STREPGPB Positive (A) 2018    URINECX Final report 2021       U/S:    1. IUP 23w0d  2. Routine care   3. Blood pressure is reasonable today.  Patient never started her labetalol as she has a cuff at home is been monitoring and her pressures have not been elevated.  She is a history of preeclampsia with G1 but not with the 2 subsequent pregnancies.  She is taking a baby aspirin.  Her protein creatinine ratio was 287  4. Gluocla today.

## 2021-11-22 ENCOUNTER — ROUTINE PRENATAL (OUTPATIENT)
Dept: OBSTETRICS AND GYNECOLOGY | Facility: CLINIC | Age: 31
End: 2021-11-22

## 2021-11-22 VITALS — SYSTOLIC BLOOD PRESSURE: 124 MMHG | DIASTOLIC BLOOD PRESSURE: 74 MMHG | BODY MASS INDEX: 35.99 KG/M2 | WEIGHT: 223 LBS

## 2021-11-22 DIAGNOSIS — Z98.891 PREVIOUS CESAREAN SECTION: ICD-10-CM

## 2021-11-22 DIAGNOSIS — Z34.92 SECOND TRIMESTER PREGNANCY: Primary | ICD-10-CM

## 2021-11-22 PROCEDURE — 0502F SUBSEQUENT PRENATAL CARE: CPT | Performed by: NURSE PRACTITIONER

## 2021-12-13 ENCOUNTER — ROUTINE PRENATAL (OUTPATIENT)
Dept: OBSTETRICS AND GYNECOLOGY | Facility: CLINIC | Age: 31
End: 2021-12-13

## 2021-12-13 VITALS — SYSTOLIC BLOOD PRESSURE: 124 MMHG | DIASTOLIC BLOOD PRESSURE: 74 MMHG | WEIGHT: 226 LBS | BODY MASS INDEX: 36.48 KG/M2

## 2021-12-13 DIAGNOSIS — Z98.891 PREVIOUS CESAREAN SECTION: Primary | ICD-10-CM

## 2021-12-13 DIAGNOSIS — O09.299 HX OF PREECLAMPSIA, PRIOR PREGNANCY, CURRENTLY PREGNANT: ICD-10-CM

## 2021-12-13 PROCEDURE — 0502F SUBSEQUENT PRENATAL CARE: CPT | Performed by: MIDWIFE

## 2021-12-13 NOTE — PROGRESS NOTES
Chief Complaint   Patient presents with   • Routine Prenatal Visit     No complaints       HPI: Nelsy is a  currently at 29w6d who today reports the following:  Baby is active. She never started her Labetalol. BPs have been 120s/70s at home. She denies any headache, blurred vision or dizziness. She is taking ASA daily.                EXAM:     Vitals:    21 1027   BP: 124/74      Abdomen:   See prenatal flowsheet as noted and reviewed, soft, nontender   Pelvic:  See prenatal flowsheet as noted and reviewed   Urine:  See prenatal flowsheet as noted and reviewed    Lab Results   Component Value Date    ABO O 2021    RH Positive 2021    ABSCRN Negative 2021       MDM:  Impression: Previous C/S with planned repeat C/S   Hx of preeclampsia with first pregnancy   Tests done today: none   Topics discussed: kick counts and fetal movement   labor signs and symptoms  Repeat CSection   Reviewed OB labs   Tests next visit: U/S for EFW                RTO:                        2 weeks    This note was electronically signed.  JULIANNE Luther  2021

## 2021-12-28 ENCOUNTER — ROUTINE PRENATAL (OUTPATIENT)
Dept: OBSTETRICS AND GYNECOLOGY | Facility: CLINIC | Age: 31
End: 2021-12-28

## 2021-12-28 ENCOUNTER — HOSPITAL ENCOUNTER (OUTPATIENT)
Facility: HOSPITAL | Age: 31
Setting detail: SURGERY ADMIT
End: 2021-12-28
Attending: OBSTETRICS & GYNECOLOGY | Admitting: OBSTETRICS & GYNECOLOGY

## 2021-12-28 VITALS — BODY MASS INDEX: 36.8 KG/M2 | SYSTOLIC BLOOD PRESSURE: 124 MMHG | DIASTOLIC BLOOD PRESSURE: 74 MMHG | WEIGHT: 228 LBS

## 2021-12-28 DIAGNOSIS — Z34.93 THIRD TRIMESTER PREGNANCY: Primary | ICD-10-CM

## 2021-12-28 DIAGNOSIS — O10.919 CHRONIC HYPERTENSION DURING PREGNANCY: ICD-10-CM

## 2021-12-28 PROBLEM — Z34.90 PREGNANCY: Status: ACTIVE | Noted: 2021-12-28

## 2021-12-28 PROCEDURE — 0502F SUBSEQUENT PRENATAL CARE: CPT | Performed by: OBSTETRICS & GYNECOLOGY

## 2021-12-28 RX ORDER — CARBOPROST TROMETHAMINE 250 UG/ML
250 INJECTION, SOLUTION INTRAMUSCULAR AS NEEDED
Status: CANCELLED | OUTPATIENT
Start: 2021-12-28

## 2021-12-28 RX ORDER — MISOPROSTOL 100 UG/1
800 TABLET ORAL AS NEEDED
Status: CANCELLED | OUTPATIENT
Start: 2021-12-28

## 2021-12-28 RX ORDER — METHYLERGONOVINE MALEATE 0.2 MG/ML
200 INJECTION INTRAVENOUS ONCE AS NEEDED
Status: CANCELLED | OUTPATIENT
Start: 2021-12-28

## 2021-12-28 RX ORDER — SODIUM CHLORIDE 0.9 % (FLUSH) 0.9 %
10 SYRINGE (ML) INJECTION AS NEEDED
Status: CANCELLED | OUTPATIENT
Start: 2021-12-28

## 2021-12-28 RX ORDER — SODIUM CHLORIDE 0.9 % (FLUSH) 0.9 %
10 SYRINGE (ML) INJECTION EVERY 12 HOURS SCHEDULED
Status: CANCELLED | OUTPATIENT
Start: 2021-12-28

## 2021-12-28 RX ORDER — LIDOCAINE HYDROCHLORIDE 10 MG/ML
5 INJECTION, SOLUTION EPIDURAL; INFILTRATION; INTRACAUDAL; PERINEURAL AS NEEDED
Status: CANCELLED | OUTPATIENT
Start: 2021-12-28

## 2021-12-28 NOTE — PROGRESS NOTES
Chief Complaint   Patient presents with   • Routine Prenatal Visit     Patient compalins of leg cramps left leg at night         HPI:   , 32w0d gestation reports doing well    ROS:  See Prenatal Episode/Flowsheet  /74   Wt 103 kg (228 lb)   LMP 2021   BMI 36.80 kg/m²      EXAM:  EXTREMITIES:  No swelling-See Prenatal Episode/Flowsheet    ABDOMEN:  FHTs/Movement noted-See Prenatal Episode/Flowsheet    URINE GLUCOSE/PROTEIN:  See Prenatal Episode/Flowsheet    PELVIC EXAM:  See Prenatal Episode/Flowsheet  CV:  Lungs:  GYN:    MDM:    Lab Results   Component Value Date    HGB 13.3 10/26/2021    RUBELLAABIGG 1.05 2021    HEPBSAG Negative 2021    ABO O 2021    RH Positive 2021    ABSCRN Negative 2021    [id number] Non Reactive 2021    HEPCVIRUSABY <0.1 2021    STREPGPB Positive (A) 2018    URINECX Final report 2021       U/S:overall growth 52.3%, symm, YUN 14, Breech, active fetus, anterior placenta    1. IUP 32w0d  2. Routine care   3. CHTN and  Prior C/S x 3-- Repeat C/S, +/_ BTL set for 38 weeks

## 2022-01-10 ENCOUNTER — ROUTINE PRENATAL (OUTPATIENT)
Dept: OBSTETRICS AND GYNECOLOGY | Facility: CLINIC | Age: 32
End: 2022-01-10

## 2022-01-10 VITALS — BODY MASS INDEX: 36.96 KG/M2 | SYSTOLIC BLOOD PRESSURE: 118 MMHG | WEIGHT: 229 LBS | DIASTOLIC BLOOD PRESSURE: 74 MMHG

## 2022-01-10 DIAGNOSIS — Z98.891 PREVIOUS CESAREAN SECTION: ICD-10-CM

## 2022-01-10 DIAGNOSIS — Z34.93 NORMAL PREGNANCY, THIRD TRIMESTER: Primary | ICD-10-CM

## 2022-01-10 PROCEDURE — 0502F SUBSEQUENT PRENATAL CARE: CPT | Performed by: NURSE PRACTITIONER

## 2022-01-10 NOTE — PROGRESS NOTES
[id number]  Chief Complaint   Patient presents with   • Routine Prenatal Visit     No Coomplaints/concerns, Good fetal movement         HPI  Nelsy is a  currently at 33w6d who today reports the following:   No c/o - good FM       EXAM  /74   Wt 104 kg (229 lb)   LMP 2021   BMI 36.96 kg/m²  -See Prenatal Assessment  General Appearance:  Pleasant  Lungs: Breathing unlabored  Abdomen:  See flow sheet for Fundal ht, FM, FHT's  LE: Neg edema  V/E: Not performed     Social History     Tobacco Use   • Smoking status: Never Smoker   • Smokeless tobacco: Never Used   Vaping Use   • Vaping Use: Never used   Substance Use Topics   • Alcohol use: No   • Drug use: No         Lab Results   Component Value Date    ABO O 2021    RH Positive 2021    ABSCRN Negative 2021       MDM  Impression: Supervision of high risk pregnancy   Previous C/S - plans repeat   Hx preE   Tests done today: none   Topics discussed: continue to note good FM  Continue ASA 81 mg daily  T-dap   PIH precautions  s/s PTL  encouraged questions - call prn   Written info optional/provided:  -T-DAP   Tests next visit: GBS testing

## 2022-01-25 ENCOUNTER — ROUTINE PRENATAL (OUTPATIENT)
Dept: OBSTETRICS AND GYNECOLOGY | Facility: CLINIC | Age: 32
End: 2022-01-25

## 2022-01-25 VITALS — BODY MASS INDEX: 38.41 KG/M2 | DIASTOLIC BLOOD PRESSURE: 78 MMHG | SYSTOLIC BLOOD PRESSURE: 128 MMHG | WEIGHT: 238 LBS

## 2022-01-25 DIAGNOSIS — Z36.85 ANTENATAL SCREENING FOR STREPTOCOCCUS B: Primary | ICD-10-CM

## 2022-01-25 PROCEDURE — 0502F SUBSEQUENT PRENATAL CARE: CPT | Performed by: OBSTETRICS & GYNECOLOGY

## 2022-01-25 NOTE — PROGRESS NOTES
Chief Complaint   Patient presents with   • Routine Prenatal Visit     Patient complains of swelling in feet,hands and face and also fell down the stairs on  but still has had goot fetal movement         HPI:   , 36w0d gestation reports doing well    ROS:  See Prenatal Episode/Flowsheet  /78   Wt 108 kg (238 lb)   LMP 2021   BMI 38.41 kg/m²      EXAM:  EXTREMITIES:  No swelling-See Prenatal Episode/Flowsheet    ABDOMEN:  FHTs/Movement noted-See Prenatal Episode/Flowsheet    URINE GLUCOSE/PROTEIN:  See Prenatal Episode/Flowsheet    PELVIC EXAM:  See Prenatal Episode/Flowsheet  CV:  Lungs:  GYN:    MDM:    Lab Results   Component Value Date    HGB 13.3 10/26/2021    RUBELLAABIGG 1.05 2021    HEPBSAG Negative 2021    ABO O 2021    RH Positive 2021    ABSCRN Negative 2021    [id number] Non Reactive 2021    HEPCVIRUSABY <0.1 2021    STREPGPB Positive (A) 2018    URINECX Final report 2021       U/S:US Ob Follow Up Transabdominal Approach (2021 13:51)      1. IUP 36w0d  2. Routine care   3. Prior C/S x 3--R c/S and BTL set

## 2022-01-29 LAB — B-HEM STREP SPEC QL CULT: NEGATIVE

## 2022-01-31 ENCOUNTER — ROUTINE PRENATAL (OUTPATIENT)
Dept: OBSTETRICS AND GYNECOLOGY | Facility: CLINIC | Age: 32
End: 2022-01-31

## 2022-01-31 VITALS — BODY MASS INDEX: 38.09 KG/M2 | DIASTOLIC BLOOD PRESSURE: 68 MMHG | SYSTOLIC BLOOD PRESSURE: 124 MMHG | WEIGHT: 236 LBS

## 2022-01-31 DIAGNOSIS — Z34.93 THIRD TRIMESTER PREGNANCY: ICD-10-CM

## 2022-01-31 DIAGNOSIS — Z98.891 PREVIOUS CESAREAN SECTION: ICD-10-CM

## 2022-01-31 DIAGNOSIS — O09.299 HX OF PREECLAMPSIA, PRIOR PREGNANCY, CURRENTLY PREGNANT: Primary | ICD-10-CM

## 2022-01-31 PROCEDURE — 0502F SUBSEQUENT PRENATAL CARE: CPT | Performed by: MIDWIFE

## 2022-01-31 NOTE — PROGRESS NOTES
Chief Complaint   Patient presents with   • Routine Prenatal Visit     No Complaints/concerns, Good fetal movement        HPI: Nelsy is a  currently at 36w6d here for routine prenatal visit who today reports the following: Baby is active. C/Section is scheduled for 22.                 EXAM:     Vitals:    22 0948   BP: 124/68      Abdomen:   See prenatal flowsheet as noted and reviewed, soft, nontender   Pelvic:  See prenatal flowsheet as noted and reviewed   Urine:  See prenatal flowsheet as noted and reviewed    Lab Results   Component Value Date    ABO O 2021    RH Positive 2021    ABSCRN Negative 2021       MDM:  Impression: Chronic HTN in pregnancy  Previous C/S with planned repeat C/S   Tests done today: GBS testing   Topics discussed: kick counts and fetal movement  labor signs and symptoms  Reviewed OB labs   Tests next visit: none                RTO:                        1 week    This note was electronically signed.  Bailey Larry, JULIANNE  2022

## 2022-02-07 ENCOUNTER — LAB (OUTPATIENT)
Dept: LAB | Facility: HOSPITAL | Age: 32
End: 2022-02-07

## 2022-02-07 DIAGNOSIS — Z34.93 THIRD TRIMESTER PREGNANCY: ICD-10-CM

## 2022-02-07 DIAGNOSIS — O10.919 CHRONIC HYPERTENSION DURING PREGNANCY: ICD-10-CM

## 2022-02-07 DIAGNOSIS — Z01.818 PRE-OP TESTING: Primary | ICD-10-CM

## 2022-02-07 LAB — SARS-COV-2 RNA PNL SPEC NAA+PROBE: DETECTED

## 2022-02-07 PROCEDURE — 87635 SARS-COV-2 COVID-19 AMP PRB: CPT

## 2022-02-07 PROCEDURE — C9803 HOPD COVID-19 SPEC COLLECT: HCPCS

## 2022-02-08 ENCOUNTER — ANESTHESIA (OUTPATIENT)
Dept: PERIOP | Facility: HOSPITAL | Age: 32
End: 2022-02-08

## 2022-02-08 ENCOUNTER — ANESTHESIA EVENT (OUTPATIENT)
Dept: PERIOP | Facility: HOSPITAL | Age: 32
End: 2022-02-08

## 2022-02-08 ENCOUNTER — HOSPITAL ENCOUNTER (INPATIENT)
Facility: HOSPITAL | Age: 32
LOS: 2 days | Discharge: HOME | End: 2022-02-10
Attending: OBSTETRICS & GYNECOLOGY | Admitting: OBSTETRICS & GYNECOLOGY

## 2022-02-08 DIAGNOSIS — Z98.891 S/P CESAREAN SECTION: Primary | ICD-10-CM

## 2022-02-08 DIAGNOSIS — Z34.93 THIRD TRIMESTER PREGNANCY: ICD-10-CM

## 2022-02-08 DIAGNOSIS — O10.919 CHRONIC HYPERTENSION DURING PREGNANCY: ICD-10-CM

## 2022-02-08 LAB
ABO GROUP BLD: NORMAL
BASOPHILS # BLD AUTO: 0.01 10*3/MM3 (ref 0–0.2)
BASOPHILS NFR BLD AUTO: 0.1 % (ref 0–1.5)
BLD GP AB SCN SERPL QL: NEGATIVE
DEPRECATED RDW RBC AUTO: 47 FL (ref 37–54)
EOSINOPHIL # BLD AUTO: 0.1 10*3/MM3 (ref 0–0.4)
EOSINOPHIL NFR BLD AUTO: 1.3 % (ref 0.3–6.2)
ERYTHROCYTE [DISTWIDTH] IN BLOOD BY AUTOMATED COUNT: 14.6 % (ref 12.3–15.4)
HCT VFR BLD AUTO: 40.3 % (ref 34–46.6)
HGB BLD-MCNC: 13.7 G/DL (ref 12–15.9)
IMM GRANULOCYTES # BLD AUTO: 0.05 10*3/MM3 (ref 0–0.05)
IMM GRANULOCYTES NFR BLD AUTO: 0.6 % (ref 0–0.5)
LYMPHOCYTES # BLD AUTO: 1.85 10*3/MM3 (ref 0.7–3.1)
LYMPHOCYTES NFR BLD AUTO: 23.1 % (ref 19.6–45.3)
MCH RBC QN AUTO: 30.3 PG (ref 26.6–33)
MCHC RBC AUTO-ENTMCNC: 34 G/DL (ref 31.5–35.7)
MCV RBC AUTO: 89.2 FL (ref 79–97)
MONOCYTES # BLD AUTO: 0.62 10*3/MM3 (ref 0.1–0.9)
MONOCYTES NFR BLD AUTO: 7.8 % (ref 5–12)
NEUTROPHILS NFR BLD AUTO: 5.37 10*3/MM3 (ref 1.7–7)
NEUTROPHILS NFR BLD AUTO: 67.1 % (ref 42.7–76)
NRBC BLD AUTO-RTO: 0 /100 WBC (ref 0–0.2)
PLATELET # BLD AUTO: 274 10*3/MM3 (ref 140–450)
PMV BLD AUTO: 9.4 FL (ref 6–12)
RBC # BLD AUTO: 4.52 10*6/MM3 (ref 3.77–5.28)
RH BLD: POSITIVE
T&S EXPIRATION DATE: NORMAL
WBC NRBC COR # BLD: 8 10*3/MM3 (ref 3.4–10.8)

## 2022-02-08 PROCEDURE — 25010000002 ONDANSETRON PER 1 MG: Performed by: NURSE ANESTHETIST, CERTIFIED REGISTERED

## 2022-02-08 PROCEDURE — 25010000002 KETOROLAC TROMETHAMINE PER 15 MG: Performed by: NURSE ANESTHETIST, CERTIFIED REGISTERED

## 2022-02-08 PROCEDURE — 25010000002 DIPHENHYDRAMINE PER 50 MG: Performed by: NURSE ANESTHETIST, CERTIFIED REGISTERED

## 2022-02-08 PROCEDURE — 58611 LIGATE OVIDUCT(S) ADD-ON: CPT | Performed by: OBSTETRICS & GYNECOLOGY

## 2022-02-08 PROCEDURE — 25010000002 MORPHINE PER 10 MG: Performed by: NURSE ANESTHETIST, CERTIFIED REGISTERED

## 2022-02-08 PROCEDURE — 59025 FETAL NON-STRESS TEST: CPT

## 2022-02-08 PROCEDURE — 0 CEFAZOLIN SODIUM-DEXTROSE 2-3 GM-%(50ML) RECONSTITUTED SOLUTION: Performed by: OBSTETRICS & GYNECOLOGY

## 2022-02-08 PROCEDURE — 0JNC0ZZ RELEASE PELVIC REGION SUBCUTANEOUS TISSUE AND FASCIA, OPEN APPROACH: ICD-10-PCS | Performed by: OBSTETRICS & GYNECOLOGY

## 2022-02-08 PROCEDURE — 51703 INSERT BLADDER CATH COMPLEX: CPT

## 2022-02-08 PROCEDURE — 0U570ZZ DESTRUCTION OF BILATERAL FALLOPIAN TUBES, OPEN APPROACH: ICD-10-PCS | Performed by: OBSTETRICS & GYNECOLOGY

## 2022-02-08 PROCEDURE — S0260 H&P FOR SURGERY: HCPCS | Performed by: OBSTETRICS & GYNECOLOGY

## 2022-02-08 PROCEDURE — C1889 IMPLANT/INSERT DEVICE, NOC: HCPCS | Performed by: OBSTETRICS & GYNECOLOGY

## 2022-02-08 PROCEDURE — 86850 RBC ANTIBODY SCREEN: CPT | Performed by: OBSTETRICS & GYNECOLOGY

## 2022-02-08 PROCEDURE — 85025 COMPLETE CBC W/AUTO DIFF WBC: CPT | Performed by: OBSTETRICS & GYNECOLOGY

## 2022-02-08 PROCEDURE — 59515 CESAREAN DELIVERY: CPT | Performed by: OBSTETRICS & GYNECOLOGY

## 2022-02-08 PROCEDURE — 25010000002 PHENYLEPHRINE 10 MG/ML SOLUTION: Performed by: NURSE ANESTHETIST, CERTIFIED REGISTERED

## 2022-02-08 PROCEDURE — 86901 BLOOD TYPING SEROLOGIC RH(D): CPT | Performed by: OBSTETRICS & GYNECOLOGY

## 2022-02-08 PROCEDURE — 86900 BLOOD TYPING SEROLOGIC ABO: CPT | Performed by: OBSTETRICS & GYNECOLOGY

## 2022-02-08 PROCEDURE — 25010000002 DEXAMETHASONE PER 1 MG: Performed by: NURSE ANESTHETIST, CERTIFIED REGISTERED

## 2022-02-08 PROCEDURE — 25010000002 METOCLOPRAMIDE PER 10 MG: Performed by: NURSE ANESTHETIST, CERTIFIED REGISTERED

## 2022-02-08 PROCEDURE — 63710000001 ONDANSETRON PER 8 MG: Performed by: OBSTETRICS & GYNECOLOGY

## 2022-02-08 PROCEDURE — 63710000001 PROMETHAZINE PER 12.5 MG: Performed by: OBSTETRICS & GYNECOLOGY

## 2022-02-08 DEVICE — HEMOST ABS SURGIFOAM SZ100 8X12 10MM: Type: IMPLANTABLE DEVICE | Site: ABDOMEN | Status: FUNCTIONAL

## 2022-02-08 RX ORDER — METHYLERGONOVINE MALEATE 0.2 MG/ML
200 INJECTION INTRAVENOUS AS NEEDED
Status: DISCONTINUED | OUTPATIENT
Start: 2022-02-08 | End: 2022-02-10 | Stop reason: HOSPADM

## 2022-02-08 RX ORDER — ONDANSETRON 2 MG/ML
INJECTION INTRAMUSCULAR; INTRAVENOUS AS NEEDED
Status: DISCONTINUED | OUTPATIENT
Start: 2022-02-08 | End: 2022-02-08 | Stop reason: SURG

## 2022-02-08 RX ORDER — SODIUM CHLORIDE, SODIUM LACTATE, POTASSIUM CHLORIDE, CALCIUM CHLORIDE 600; 310; 30; 20 MG/100ML; MG/100ML; MG/100ML; MG/100ML
INJECTION, SOLUTION INTRAVENOUS CONTINUOUS PRN
Status: DISCONTINUED | OUTPATIENT
Start: 2022-02-08 | End: 2022-02-08 | Stop reason: SURG

## 2022-02-08 RX ORDER — OXYCODONE HYDROCHLORIDE 5 MG/1
10 TABLET ORAL EVERY 4 HOURS PRN
Status: DISCONTINUED | OUTPATIENT
Start: 2022-02-08 | End: 2022-02-10 | Stop reason: HOSPADM

## 2022-02-08 RX ORDER — NALOXONE HYDROCHLORIDE 0.4 MG/ML
0.1 INJECTION, SOLUTION INTRAMUSCULAR; INTRAVENOUS; SUBCUTANEOUS ONCE
Status: DISCONTINUED | OUTPATIENT
Start: 2022-02-08 | End: 2022-02-10 | Stop reason: HOSPADM

## 2022-02-08 RX ORDER — NALBUPHINE HCL 10 MG/ML
2 AMPUL (ML) INJECTION
Status: DISCONTINUED | OUTPATIENT
Start: 2022-02-08 | End: 2022-02-10 | Stop reason: HOSPADM

## 2022-02-08 RX ORDER — KETOROLAC TROMETHAMINE 30 MG/ML
INJECTION, SOLUTION INTRAMUSCULAR; INTRAVENOUS AS NEEDED
Status: DISCONTINUED | OUTPATIENT
Start: 2022-02-08 | End: 2022-02-08 | Stop reason: SURG

## 2022-02-08 RX ORDER — ONDANSETRON 4 MG/1
4 TABLET, FILM COATED ORAL EVERY 8 HOURS PRN
Status: DISCONTINUED | OUTPATIENT
Start: 2022-02-08 | End: 2022-02-10 | Stop reason: HOSPADM

## 2022-02-08 RX ORDER — MISOPROSTOL 200 UG/1
800 TABLET ORAL AS NEEDED
Status: DISCONTINUED | OUTPATIENT
Start: 2022-02-08 | End: 2022-02-08 | Stop reason: HOSPADM

## 2022-02-08 RX ORDER — MORPHINE SULFATE 1 MG/ML
INJECTION, SOLUTION EPIDURAL; INTRATHECAL; INTRAVENOUS AS NEEDED
Status: DISCONTINUED | OUTPATIENT
Start: 2022-02-08 | End: 2022-02-08 | Stop reason: SURG

## 2022-02-08 RX ORDER — OXYCODONE HYDROCHLORIDE 5 MG/1
5 TABLET ORAL EVERY 4 HOURS PRN
Status: DISCONTINUED | OUTPATIENT
Start: 2022-02-08 | End: 2022-02-10 | Stop reason: HOSPADM

## 2022-02-08 RX ORDER — METOCLOPRAMIDE HYDROCHLORIDE 5 MG/ML
INJECTION INTRAMUSCULAR; INTRAVENOUS AS NEEDED
Status: DISCONTINUED | OUTPATIENT
Start: 2022-02-08 | End: 2022-02-08 | Stop reason: SURG

## 2022-02-08 RX ORDER — DEXTROSE AND SODIUM CHLORIDE 5; .9 G/100ML; G/100ML
125 INJECTION, SOLUTION INTRAVENOUS CONTINUOUS
Status: DISCONTINUED | OUTPATIENT
Start: 2022-02-08 | End: 2022-02-10 | Stop reason: HOSPADM

## 2022-02-08 RX ORDER — DEXAMETHASONE SODIUM PHOSPHATE 4 MG/ML
INJECTION, SOLUTION INTRA-ARTICULAR; INTRALESIONAL; INTRAMUSCULAR; INTRAVENOUS; SOFT TISSUE AS NEEDED
Status: DISCONTINUED | OUTPATIENT
Start: 2022-02-08 | End: 2022-02-08 | Stop reason: SURG

## 2022-02-08 RX ORDER — TRISODIUM CITRATE DIHYDRATE AND CITRIC ACID MONOHYDRATE 500; 334 MG/5ML; MG/5ML
30 SOLUTION ORAL ONCE
Status: COMPLETED | OUTPATIENT
Start: 2022-02-08 | End: 2022-02-08

## 2022-02-08 RX ORDER — DIPHENHYDRAMINE HYDROCHLORIDE 50 MG/ML
25 INJECTION INTRAMUSCULAR; INTRAVENOUS EVERY 6 HOURS PRN
Status: DISCONTINUED | OUTPATIENT
Start: 2022-02-08 | End: 2022-02-10 | Stop reason: HOSPADM

## 2022-02-08 RX ORDER — OXYTOCIN 10 [USP'U]/ML
INJECTION, SOLUTION INTRAMUSCULAR; INTRAVENOUS AS NEEDED
Status: DISCONTINUED | OUTPATIENT
Start: 2022-02-08 | End: 2022-02-08 | Stop reason: SURG

## 2022-02-08 RX ORDER — MEPERIDINE HYDROCHLORIDE 25 MG/ML
25 INJECTION INTRAMUSCULAR; INTRAVENOUS; SUBCUTANEOUS
Status: DISCONTINUED | OUTPATIENT
Start: 2022-02-08 | End: 2022-02-08 | Stop reason: HOSPADM

## 2022-02-08 RX ORDER — HYDROCORTISONE 25 MG/G
CREAM TOPICAL 3 TIMES DAILY PRN
Status: DISCONTINUED | OUTPATIENT
Start: 2022-02-08 | End: 2022-02-10 | Stop reason: HOSPADM

## 2022-02-08 RX ORDER — LIDOCAINE HYDROCHLORIDE 10 MG/ML
5 INJECTION, SOLUTION EPIDURAL; INFILTRATION; INTRACAUDAL; PERINEURAL AS NEEDED
Status: DISCONTINUED | OUTPATIENT
Start: 2022-02-08 | End: 2022-02-08 | Stop reason: HOSPADM

## 2022-02-08 RX ORDER — MAGNESIUM HYDROXIDE 1200 MG/15ML
LIQUID ORAL AS NEEDED
Status: DISCONTINUED | OUTPATIENT
Start: 2022-02-08 | End: 2022-02-08 | Stop reason: HOSPADM

## 2022-02-08 RX ORDER — CARBOPROST TROMETHAMINE 250 UG/ML
250 INJECTION, SOLUTION INTRAMUSCULAR AS NEEDED
Status: DISCONTINUED | OUTPATIENT
Start: 2022-02-08 | End: 2022-02-10 | Stop reason: HOSPADM

## 2022-02-08 RX ORDER — FAMOTIDINE 10 MG/ML
20 INJECTION, SOLUTION INTRAVENOUS
Status: COMPLETED | OUTPATIENT
Start: 2022-02-08 | End: 2022-02-08

## 2022-02-08 RX ORDER — METHYLERGONOVINE MALEATE 0.2 MG/ML
200 INJECTION INTRAVENOUS ONCE AS NEEDED
Status: DISCONTINUED | OUTPATIENT
Start: 2022-02-08 | End: 2022-02-08 | Stop reason: HOSPADM

## 2022-02-08 RX ORDER — SODIUM CHLORIDE 0.9 % (FLUSH) 0.9 %
10 SYRINGE (ML) INJECTION EVERY 12 HOURS SCHEDULED
Status: DISCONTINUED | OUTPATIENT
Start: 2022-02-08 | End: 2022-02-08 | Stop reason: HOSPADM

## 2022-02-08 RX ORDER — PHENYLEPHRINE HYDROCHLORIDE 10 MG/ML
INJECTION INTRAVENOUS AS NEEDED
Status: DISCONTINUED | OUTPATIENT
Start: 2022-02-08 | End: 2022-02-08 | Stop reason: SURG

## 2022-02-08 RX ORDER — IBUPROFEN 800 MG/1
800 TABLET ORAL EVERY 8 HOURS SCHEDULED
Status: DISCONTINUED | OUTPATIENT
Start: 2022-02-08 | End: 2022-02-10 | Stop reason: HOSPADM

## 2022-02-08 RX ORDER — BUPIVACAINE HYDROCHLORIDE 7.5 MG/ML
INJECTION, SOLUTION EPIDURAL; RETROBULBAR
Status: COMPLETED | OUTPATIENT
Start: 2022-02-08 | End: 2022-02-08

## 2022-02-08 RX ORDER — CEFAZOLIN SODIUM 2 G/50ML
2 SOLUTION INTRAVENOUS ONCE
Status: COMPLETED | OUTPATIENT
Start: 2022-02-08 | End: 2022-02-08

## 2022-02-08 RX ORDER — CARBOPROST TROMETHAMINE 250 UG/ML
250 INJECTION, SOLUTION INTRAMUSCULAR AS NEEDED
Status: DISCONTINUED | OUTPATIENT
Start: 2022-02-08 | End: 2022-02-08 | Stop reason: HOSPADM

## 2022-02-08 RX ORDER — SODIUM CHLORIDE 0.9 % (FLUSH) 0.9 %
10 SYRINGE (ML) INJECTION AS NEEDED
Status: DISCONTINUED | OUTPATIENT
Start: 2022-02-08 | End: 2022-02-08 | Stop reason: HOSPADM

## 2022-02-08 RX ORDER — SIMETHICONE 80 MG
80 TABLET,CHEWABLE ORAL 4 TIMES DAILY PRN
Status: DISCONTINUED | OUTPATIENT
Start: 2022-02-08 | End: 2022-02-10 | Stop reason: HOSPADM

## 2022-02-08 RX ORDER — PROMETHAZINE HYDROCHLORIDE 12.5 MG/1
12.5 TABLET ORAL EVERY 4 HOURS PRN
Status: DISCONTINUED | OUTPATIENT
Start: 2022-02-08 | End: 2022-02-10 | Stop reason: HOSPADM

## 2022-02-08 RX ORDER — ACETAMINOPHEN 500 MG
1000 TABLET ORAL EVERY 8 HOURS SCHEDULED
Status: DISCONTINUED | OUTPATIENT
Start: 2022-02-08 | End: 2022-02-10 | Stop reason: HOSPADM

## 2022-02-08 RX ORDER — ONDANSETRON 2 MG/ML
4 INJECTION INTRAMUSCULAR; INTRAVENOUS EVERY 6 HOURS PRN
Status: DISCONTINUED | OUTPATIENT
Start: 2022-02-08 | End: 2022-02-10 | Stop reason: HOSPADM

## 2022-02-08 RX ADMIN — OXYTOCIN 20 UNITS: 10 INJECTION INTRAVENOUS at 08:02

## 2022-02-08 RX ADMIN — METOCLOPRAMIDE 10 MG: 5 INJECTION, SOLUTION INTRAMUSCULAR; INTRAVENOUS at 08:02

## 2022-02-08 RX ADMIN — PROMETHAZINE HYDROCHLORIDE 12.5 MG: 12.5 TABLET ORAL at 20:11

## 2022-02-08 RX ADMIN — BUPIVACAINE HYDROCHLORIDE 2 ML: 7.5 INJECTION, SOLUTION EPIDURAL; RETROBULBAR at 07:33

## 2022-02-08 RX ADMIN — CEFAZOLIN SODIUM 2 G: 2 SOLUTION INTRAVENOUS at 07:30

## 2022-02-08 RX ADMIN — OXYTOCIN 20 UNITS: 10 INJECTION INTRAVENOUS at 07:57

## 2022-02-08 RX ADMIN — FAMOTIDINE 20 MG: 10 INJECTION INTRAVENOUS at 06:39

## 2022-02-08 RX ADMIN — IBUPROFEN 800 MG: 800 TABLET ORAL at 17:40

## 2022-02-08 RX ADMIN — MORPHINE SULFATE 0.5 MG: 1 INJECTION EPIDURAL; INTRATHECAL; INTRAVENOUS at 07:33

## 2022-02-08 RX ADMIN — DIPHENHYDRAMINE HYDROCHLORIDE 25 MG: 50 INJECTION INTRAMUSCULAR; INTRAVENOUS at 20:12

## 2022-02-08 RX ADMIN — PHENYLEPHRINE HYDROCHLORIDE 200 MCG: 10 INJECTION INTRAVENOUS at 07:46

## 2022-02-08 RX ADMIN — SODIUM CHLORIDE, POTASSIUM CHLORIDE, SODIUM LACTATE AND CALCIUM CHLORIDE: 600; 310; 30; 20 INJECTION, SOLUTION INTRAVENOUS at 08:35

## 2022-02-08 RX ADMIN — ONDANSETRON 8 MG: 2 INJECTION INTRAMUSCULAR; INTRAVENOUS at 07:30

## 2022-02-08 RX ADMIN — SODIUM CHLORIDE, POTASSIUM CHLORIDE, SODIUM LACTATE AND CALCIUM CHLORIDE 2000 ML: 600; 310; 30; 20 INJECTION, SOLUTION INTRAVENOUS at 05:28

## 2022-02-08 RX ADMIN — SODIUM CHLORIDE, POTASSIUM CHLORIDE, SODIUM LACTATE AND CALCIUM CHLORIDE: 600; 310; 30; 20 INJECTION, SOLUTION INTRAVENOUS at 07:26

## 2022-02-08 RX ADMIN — OXYCODONE 10 MG: 5 TABLET ORAL at 20:11

## 2022-02-08 RX ADMIN — ACETAMINOPHEN 1000 MG: 500 TABLET ORAL at 13:57

## 2022-02-08 RX ADMIN — GLYCOPYRROLATE 0.2 MCG: 0.2 INJECTION, SOLUTION INTRAMUSCULAR; INTRAVITREAL at 07:45

## 2022-02-08 RX ADMIN — PHENYLEPHRINE HYDROCHLORIDE 200 MCG: 10 INJECTION INTRAVENOUS at 07:42

## 2022-02-08 RX ADMIN — DEXAMETHASONE SODIUM PHOSPHATE 4 MG: 4 INJECTION, SOLUTION INTRAMUSCULAR; INTRAVENOUS at 08:02

## 2022-02-08 RX ADMIN — KETOROLAC TROMETHAMINE 30 MG: 30 INJECTION, SOLUTION INTRAMUSCULAR at 08:02

## 2022-02-08 RX ADMIN — ONDANSETRON HYDROCHLORIDE 4 MG: 4 TABLET, FILM COATED ORAL at 13:57

## 2022-02-08 RX ADMIN — SODIUM CHLORIDE, POTASSIUM CHLORIDE, SODIUM LACTATE AND CALCIUM CHLORIDE: 600; 310; 30; 20 INJECTION, SOLUTION INTRAVENOUS at 08:02

## 2022-02-08 RX ADMIN — SODIUM CITRATE AND CITRIC ACID MONOHYDRATE 30 ML: 500; 334 SOLUTION ORAL at 06:39

## 2022-02-08 NOTE — NON STRESS TEST
Nelsy Moratayahlmaribell Bill, a  at 38w0d with an LEDA of 2022, by Last Menstrual Period, was seen at [facility] for a nonstress test.    Chief Complaint   Patient presents with   • Scheduled        Patient Active Problem List   Diagnosis   • Hx of preeclampsia, prior pregnancy, currently pregnant   • HTN in pregnancy, chronic   • Previous  section   • Rash of face   • Pregnancy   • Third trimester pregnancy       Start Time: 0450  Stop Time: 0502

## 2022-02-08 NOTE — ANESTHESIA PROCEDURE NOTES
Spinal Block      Patient reassessed immediately prior to procedure    Patient location during procedure: OR  Start Time: 2/8/2022 7:30 AM  Stop Time: 2/8/2022 7:33 AM  Indication:procedure for pain  Performed By  CRNA: Manny Kline CRNA  Preanesthetic Checklist  Completed: patient identified, IV checked, site marked, risks and benefits discussed, surgical consent, monitors and equipment checked, pre-op evaluation and timeout performed  Spinal Block Prep:  Patient Position:sitting  Sterile Tech:cap, gloves, mask and sterile barriers  Prep:Chloraprep  Patient Monitoring:blood pressure monitoring, continuous pulse oximetry and EKG  Spinal Block Procedure  Approach:midline  Guidance:landmark technique  Location:L3-L4  Needle Type:Pencan  Needle Gauge:25 G  Placement of Spinal needle event:cerebrospinal fluid aspirated  Paresthesia: no  Fluid Appearance:clear  Medications: bupivacaine PF (MARCAINE) injection 0.75%, 2 mL  Med Administered at 2/8/2022 7:33 AM   Post Assessment  Patient Tolerance:patient tolerated the procedure well with no apparent complications  Complications no  Additional Notes  Risks of spinal anesthesia discussed , including but not limited to:  Headache, itching, nausea and vomiting, infection, failure of the block, decreased BP, permanent chronic back pain, nerve damage, paralysis, etc.    Skin localized with lidocaine skin wheal.    SPINAL INJECTED INTRATHECALLY WITH  2 ml 0.75% Bupivacaine with dextrose  0.5 mg Morphine

## 2022-02-08 NOTE — PLAN OF CARE
Goal Outcome Evaluation:  Plan of Care Reviewed With: patient           Outcome Summary: Vss, c/o post op nausea and vomitting. breastfeeding well. positive bonding observed.

## 2022-02-08 NOTE — H&P
"MIGUEL Mathias  Nelsy Hammer  : 1990  MRN: [id number]  CSN: [id number]    History and Physical  Subjective   Nelsy Hammer is a 31 y.o. year old  with an Estimated Date of Delivery: 22 scheduled for  delivery due to previous C/S - not a  candidate.  Her placental location is anterior.  She is planning for sterilization at the time of the .    Prenatal care has been with [facility].  It has been complicated by chronic HTN.    History  OB History    Para Term  AB Living   4 3 3 0 0 3   SAB IAB Ectopic Molar Multiple Live Births   0 0 0 0 0 3      # Outcome Date GA Lbr Charly/2nd Weight Sex Delivery Anes PTL Lv   4 Current            3 Term 18 39w1d  3997 g (8 lb 13 oz) F CS-LTranv Spinal N HUSEYIN      Name: NARA HAMMER      Apgar1: 9  Apgar5: 9   2 Term 11/12/15 39w0d  3969 g (8 lb 12 oz) M CS-LTranv Spinal  HUSEYIN   1 Term 10/12/10 40w0d  3289 g (7 lb 4 oz) F CS-LTranv Spinal  HUSEYIN     Past Medical History:   Diagnosis Date   • HTN in pregnancy, chronic 2021   • Patient denies medical problems    • Positive testing for group B Streptococcus 2018   • Preeclampsia    • Varicella      No current facility-administered medications on file prior to encounter.     Current Outpatient Medications on File Prior to Encounter   Medication Sig Dispense Refill   • aspirin (aspirin) 81 MG EC tablet Take 1 tablet by mouth Daily. 90 tablet 2   • famotidine (PEPCID) 20 MG tablet Take 1 tablet by mouth 2 (Two) Times a Day. 60 tablet 5   • Prenatal Vit-Fe Fumarate-FA (Prenatal 27-) 27-1 MG tablet tablet Take 1 tablet by mouth Daily for 270 days. 90 tablet 3     Allergies   Allergen Reactions   • Mastisol [Wound Dressing Adhesive] Rash     \"a terrible fire burning, blistered rash\"   • Other Rash     Steri-strips     Past Surgical History:   Procedure Laterality Date   •  SECTION  ,     x's 2   •  SECTION WITH " TUBAL N/A 2018    Procedure:  SECTION REPEAT;  Surgeon: Swetha Hernandez MD;  Location: [facility] LABOR DELIVERY;  Service: Obstetrics/Gynecology   • KNEE ACL RECONSTRUCTION     • WISDOM TOOTH EXTRACTION       Family History   Problem Relation Age of Onset   • Prostate cancer Paternal Grandfather    • Cancer Paternal Grandmother    • Lung cancer Maternal Grandmother    • Prostate cancer Maternal Grandfather      Social History     Socioeconomic History   • Marital status:    Tobacco Use   • Smoking status: Never Smoker   • Smokeless tobacco: Never Used   Vaping Use   • Vaping Use: Never used   Substance and Sexual Activity   • Alcohol use: No   • Drug use: No   • Sexual activity: Yes     Partners: Male     Birth control/protection: Implant     Comment: Plans bilateral tubal ligation      Review of Systems  The following systems were reviewed and negative:  constitution, eyes, ENT, respiratory, cardiovascular, gastrointestinal, genitourinary, integument, breast, hematologic / lymphatic, musculoskeletal, neurological, behavioral/psych, endocrine and allergies / immunologic     Objective  Recent Vitals       2022       Pulse: 86 92 88         Physical Exam:  General Appearance: alert, appears stated age and cooperative  Head: normocephalic, without obvious abnormality and atraumatic  Eyes: lids and lashes normal, conjunctivae and sclerae normal, no icterus, no pallor and corneas clear  Lungs: clear to auscultation, respirations regular, respirations even and respirations unlabored  Heart: regular rhythm and normal rate and normal S1, S2  Abdomen: no hepatomegaly, no splenomegaly, soft non-tender, no guarding, no rebound tenderness and uterus gravid and nontender  Extremities: moves extremities well, no edema, no cyanosis and no redness  Skin: no bleeding, bruising or rash and no lesions noted  Psych: normal mood and affect, oriented to person, time and place, thought content  organized and appropriate judgment    Prenatal Labs  Lab Results   Component Value Date    HGB 13.7 2022    HEPBSAG Negative 2021    ABSCRN Negative 2022    [id number] Non Reactive 2021    HEPCVIRUSABY <0.1 2021    URINECX Final report 2021       Recent Labs  Lab Results (last 7 days)     Procedure Component Value Units Date/Time    CBC & Differential [[id number]]  (Abnormal) Collected: 22    Specimen: Blood Updated: 22    Narrative:      The following orders were created for panel order CBC & Differential.  Procedure                               Abnormality         Status                     ---------                               -----------         ------                     CBC Auto Differential[[id number]]        Abnormal            Final result                 Please view results for these tests on the individual orders.    CBC Auto Differential [[id number]]  (Abnormal) Collected: 22    Specimen: Blood Updated: 22     WBC 8.00 10*3/mm3      RBC 4.52 10*6/mm3      Hemoglobin 13.7 g/dL      Hematocrit 40.3 %      MCV 89.2 fL      MCH 30.3 pg      MCHC 34.0 g/dL      RDW 14.6 %      RDW-SD 47.0 fl      MPV 9.4 fL      Platelets 274 10*3/mm3      Neutrophil % 67.1 %      Lymphocyte % 23.1 %      Monocyte % 7.8 %      Eosinophil % 1.3 %      Basophil % 0.1 %      Immature Grans % 0.6 %      Neutrophils, Absolute 5.37 10*3/mm3      Lymphocytes, Absolute 1.85 10*3/mm3      Monocytes, Absolute 0.62 10*3/mm3      Eosinophils, Absolute 0.10 10*3/mm3      Basophils, Absolute 0.01 10*3/mm3      Immature Grans, Absolute 0.05 10*3/mm3      nRBC 0.0 /100 WBC            Recent Imaging  No radiology results for the last 10 days         Assessment   1. IUP with an Estimated Date of Delivery: 22.  2. Planned  section for previous C/S - not a  candidate.  3. Desires permanent surgical sterilization.     Plan   1. Repeat  with  sterilization.  2. ABx and DVT prophylaxis.  3. Risks, complications, benefits, and other alternatives discussed.  4. All questions answered and pt in agreement with plan.    Swetha Hernandez M.D.  2/8/2022

## 2022-02-08 NOTE — INTERVAL H&P NOTE
H&P updated. The patient was examined and there are no changes other than patient Covid +.  Pt still desires permanent surgical sterilization.

## 2022-02-08 NOTE — ANESTHESIA PREPROCEDURE EVALUATION
Anesthesia Evaluation     Patient summary reviewed and Nursing notes reviewed   no history of anesthetic complications:  NPO Solid Status: > 8 hours  NPO Liquid Status: > 8 hours           Airway   Mallampati: II  TM distance: >3 FB  Neck ROM: full  no difficulty expected  Dental - normal exam     Pulmonary - negative pulmonary ROS and normal exam   Cardiovascular - normal exam    Rhythm: regular  Rate: normal    (+) hypertension,       Neuro/Psych- negative ROS  GI/Hepatic/Renal/Endo - negative ROS     Musculoskeletal (-) negative ROS    Abdominal    Substance History - negative use     OB/GYN    (+) Pregnant, Preeclampsia, pregnancy induced hypertension        Other - negative ROS                       Anesthesia Plan    ASA 2     spinal   (Risks of spinal anesthesia discussed , including but not limited to:  Headache, itching, nausea and vomiting, infection, failure of the block, decreased BP, permanent chronic back pain, nerve damage, paralysis, etc.      Patient told that a low dose narcotic, such as morphine, may be added to the spinal local anesthetic which can provide 12-15 hours of pain relief after the spinal block wears off. Side effects can be itching, nausea/vomitting, but medicine will be available to treat these symptoms if necessary.    All questions answered and informed consent obtained.  )    Anesthetic plan, all risks, benefits, and alternatives have been provided, discussed and informed consent has been obtained with: patient.        CODE STATUS:

## 2022-02-08 NOTE — PLAN OF CARE
Problem: Adult Inpatient Plan of Care  Goal: Plan of Care Review  Outcome: Ongoing, Progressing  Flowsheets (Taken 2/8/2022 8081)  Outcome Summary: scheduled repeat c section  Goal: Patient-Specific Goal (Individualized)  Outcome: Ongoing, Progressing  Goal: Absence of Hospital-Acquired Illness or Injury  Outcome: Ongoing, Progressing  Goal: Optimal Comfort and Wellbeing  Outcome: Ongoing, Progressing  Goal: Readiness for Transition of Care  Outcome: Ongoing, Progressing   Goal Outcome Evaluation:              Outcome Summary: scheduled repeat c section

## 2022-02-08 NOTE — OP NOTE
Stew Bill  : 1990  MRN: [id number]  CSN: [id number]    Operative Report    Pre-Operative Dx:   1. IUP at 38w0d weeks   2. Previous  section - not a  candidate   3. Chronic hypertension  4. Desires permanent surgical sterilization  5. Covid positive      Postoperative dx:    1. Same as above  2. Adhesive disease     Procedure: Repeat  (LTCS) with bilateral partial salpingectomy   Lysis of adhesions       Surgeon: Swetha Hernandez M.D.   Assistant: JUDSON Dennis was responsible for performing the following activities: Retraction, Suction, Irrigation and Placing Dressing and their skilled assistance was necessary for the success of this case.       Anesthesia: Spinal        EBL: 1,000 mls.       Antibiotics: cefazolin 2 gms     Drains: Metcalf     Findings: Viable female infant in the vertex presentation.  Uterus and adnexa grossly normal in appearance.  There were adhesions noted in the subfascial space as well as omentum to the anterior abdominal wall.     Indications:       See H&P    Procedure Details:   After the appropriate time out, the patient was prepped and draped in the usual sterile fashion.  She had been placed in the dorsal supine left lateral tilt position.  A metcalf catheter had been placed in the bladder for drainage during the procedure. A pfannenstiel skin incision was made with a knife and carried down to the fascia.  The fascia was nicked with a scalpel and the incision was extended bilaterally with curved De La Rosa scissors. The superior aspect of the fascia was grasped with Kocher clamps x 2 and bluntly as well as sharply dissected away from the underlying rectus muscles.  There is noted to be dense adhesions in the subfascial space. These were taken down sharply as well as with Bovie electrocautery without any complications.  A similar process was repeated inferiorly. The rectus muscles were  and the peritoneal cavity was entered  sharply without complications.  There is noted to be adhesions of the omentum to the anterior abdominal wall.  These were sharply lysed without any complications.  The bladder flap was created with Metzenbaum scissors and pickups with teeth.  The  retractor was placed and after checking for no entrapment, was retracted down.  A transverse uterine incision was made with the knife and extended bilaterally.  The infant was delivered from the vertex presentation.  The mouth and nose were bulb suctioned.  The cord was doubly clamped and cut and the infant handed to the pediatric nurse in attendance.  Cord blood was obtained.  The placenta was manually extracted from the uterus.  The uterus was then elevated from the abdomen and wiped free of remaining membranes.  The uterine incision was closed with #1 chromic in a running locking fashion with a second imbricating stitch.  The left fallopian tube was grasped with a Julio clamp and identified by its fimbriated end.  A two centimeter knuckle was doubly ligated with 0-plain catgut and and sharply excised. Likewise, the same procedure was performed on the contralateral side without any complications. The specimens were sent for pathological examination.  Repeat inspection bilaterally revealed adequate hemostasis. The uterus had been returned to the abdomen.  The lateral gutters were wiped free of remaining clots.  The site of surgical incision was inspected and noted to be hemostatic with minimal Bovie electrocautery.  Repeat inspection revealed the site of tubal ligation to be hemostatic bilaterally.  The  retractor was removed.  The subfascial tissue was inspected and noted to be hemostatic with minimal Bovie electrocautery.  Gelfoam was placed over the uterine incision.  The peritoneum was closed with 3-0 chromic in a running nonlocking fashion.  The fascia was closed with 0 PDS in a running non-locking fashion.  Subcutaneous tissue was inspected and  noted to be hemostatic with minimal bovie electrocautery.  [place]'s fascia was closed with 3-0 chromic in a running non-locking fashion.  The skin was approximated with staples.  Dressings were placed.  All instrument and sponge counts were correct at the end of the procedure. The patient tolerated the procedure well.  There were no complications.  She was taken to postoperative recovery room in stable condition.     Complications:   None      Disposition:   Mother to Mother Baby/Postpartum  in stable condition currently.   Baby to NBN  in stable condition currently.     Swetha Hernandez M.D.  2/8/2022  08:48 EST

## 2022-02-08 NOTE — ANESTHESIA POSTPROCEDURE EVALUATION
Patient: Nelsy Bill    Procedure Summary     Date: 22 Room / Location: [facility] OR 2 /  [facility]    Anesthesia Start: 726 Anesthesia Stop: 843    Procedure:  SECTION REPEAT WITH TUBAL (N/A Abdomen) Diagnosis:       Third trimester pregnancy      Chronic hypertension during pregnancy      (Third trimester pregnancy [Z34.93])      (Chronic hypertension during pregnancy [O10.919])    Surgeons: Swetha Hernandez MD Provider: Manny Kline CRNA    Anesthesia Type: spinal ASA Status: 2          Anesthesia Type: spinal    Vitals  Vitals Value Taken Time   /72 22 0953   Temp 96.4 °F (35.8 °C) 22 0943   Pulse 62 22 0959   Resp 14 22 0953   SpO2 94 % 2259   Vitals shown include unvalidated device data.          Post Anesthesia Care and Evaluation    Patient location during evaluation: PACU  Patient participation: complete - patient participated  Level of consciousness: awake  Pain score: 1  Pain management: adequate  Airway patency: patent  Anesthetic complications: No anesthetic complications  PONV Status: controlled  Cardiovascular status: acceptable and stable  Respiratory status: acceptable  Hydration status: acceptable

## 2022-02-09 LAB
BASOPHILS # BLD AUTO: 0.02 10*3/MM3 (ref 0–0.2)
BASOPHILS NFR BLD AUTO: 0.2 % (ref 0–1.5)
DEPRECATED RDW RBC AUTO: 50.9 FL (ref 37–54)
EOSINOPHIL # BLD AUTO: 0.07 10*3/MM3 (ref 0–0.4)
EOSINOPHIL NFR BLD AUTO: 0.6 % (ref 0.3–6.2)
ERYTHROCYTE [DISTWIDTH] IN BLOOD BY AUTOMATED COUNT: 14.9 % (ref 12.3–15.4)
HCT VFR BLD AUTO: 34.3 % (ref 34–46.6)
HGB BLD-MCNC: 11.4 G/DL (ref 12–15.9)
IMM GRANULOCYTES # BLD AUTO: 0.07 10*3/MM3 (ref 0–0.05)
IMM GRANULOCYTES NFR BLD AUTO: 0.6 % (ref 0–0.5)
LYMPHOCYTES # BLD AUTO: 1.46 10*3/MM3 (ref 0.7–3.1)
LYMPHOCYTES NFR BLD AUTO: 11.9 % (ref 19.6–45.3)
MCH RBC QN AUTO: 30.9 PG (ref 26.6–33)
MCHC RBC AUTO-ENTMCNC: 33.2 G/DL (ref 31.5–35.7)
MCV RBC AUTO: 93 FL (ref 79–97)
MONOCYTES # BLD AUTO: 0.8 10*3/MM3 (ref 0.1–0.9)
MONOCYTES NFR BLD AUTO: 6.5 % (ref 5–12)
NEUTROPHILS NFR BLD AUTO: 80.2 % (ref 42.7–76)
NEUTROPHILS NFR BLD AUTO: 9.82 10*3/MM3 (ref 1.7–7)
NRBC BLD AUTO-RTO: 0 /100 WBC (ref 0–0.2)
PLATELET # BLD AUTO: 209 10*3/MM3 (ref 140–450)
PMV BLD AUTO: 9 FL (ref 6–12)
RBC # BLD AUTO: 3.69 10*6/MM3 (ref 3.77–5.28)
WBC NRBC COR # BLD: 12.24 10*3/MM3 (ref 3.4–10.8)

## 2022-02-09 PROCEDURE — 85025 COMPLETE CBC W/AUTO DIFF WBC: CPT | Performed by: OBSTETRICS & GYNECOLOGY

## 2022-02-09 PROCEDURE — 0503F POSTPARTUM CARE VISIT: CPT | Performed by: PHYSICIAN ASSISTANT

## 2022-02-09 RX ORDER — DOCUSATE SODIUM 100 MG/1
100 CAPSULE, LIQUID FILLED ORAL 2 TIMES DAILY
Status: DISCONTINUED | OUTPATIENT
Start: 2022-02-09 | End: 2022-02-10 | Stop reason: HOSPADM

## 2022-02-09 RX ORDER — PRENATAL VIT/IRON FUM/FOLIC AC 27MG-0.8MG
1 TABLET ORAL DAILY
Status: DISCONTINUED | OUTPATIENT
Start: 2022-02-09 | End: 2022-02-10 | Stop reason: HOSPADM

## 2022-02-09 RX ADMIN — OXYCODONE 10 MG: 5 TABLET ORAL at 14:46

## 2022-02-09 RX ADMIN — IBUPROFEN 800 MG: 800 TABLET ORAL at 18:34

## 2022-02-09 RX ADMIN — DOCUSATE SODIUM 100 MG: 100 CAPSULE, LIQUID FILLED ORAL at 10:58

## 2022-02-09 RX ADMIN — SIMETHICONE 80 MG: 80 TABLET, CHEWABLE ORAL at 16:44

## 2022-02-09 RX ADMIN — OXYCODONE 10 MG: 5 TABLET ORAL at 08:25

## 2022-02-09 RX ADMIN — SIMETHICONE 80 MG: 80 TABLET, CHEWABLE ORAL at 08:24

## 2022-02-09 RX ADMIN — ACETAMINOPHEN 1000 MG: 500 TABLET ORAL at 05:31

## 2022-02-09 RX ADMIN — DOCUSATE SODIUM 100 MG: 100 CAPSULE, LIQUID FILLED ORAL at 21:28

## 2022-02-09 RX ADMIN — IBUPROFEN 800 MG: 800 TABLET ORAL at 10:58

## 2022-02-09 RX ADMIN — ACETAMINOPHEN 1000 MG: 500 TABLET ORAL at 21:28

## 2022-02-09 RX ADMIN — PRENATAL VITAMINS-IRON FUMARATE 27 MG IRON-FOLIC ACID 0.8 MG TABLET 1 TABLET: at 10:58

## 2022-02-09 RX ADMIN — ACETAMINOPHEN 1000 MG: 500 TABLET ORAL at 14:46

## 2022-02-09 RX ADMIN — IBUPROFEN 800 MG: 800 TABLET ORAL at 02:17

## 2022-02-09 NOTE — PROGRESS NOTES
MIGUEL Mathias   PROGRESS NOTE    Post-Op Day 1 S/P   Subjective   Subjective  Patient reports:  Pain is well controlled with prescribed pain medication.  She is  ambulating. Tolerating diet. Tolerating po -- normal for liquids.  Intake -- c/o of tolerating po solids.   Voiding - has not voided without cath yet; no flatus reported..  Vaginal bleeding is as much as expected.    Objective    Objective     Vitals: Vital Signs Range for the last 24 hours  Temperature: Temp:  [96.4 °F (35.8 °C)-98.4 °F (36.9 °C)] 98.1 °F (36.7 °C)   Temp Source: Temp src: Oral   BP: BP: (109-129)/(56-76) 112/68   Pulse: Heart Rate:  [] 105   Respirations: Resp:  [14-18] 16   SPO2: SpO2:  [92 %-99 %] 96 %   O2 Amount (l/min):     O2 Devices Device (Oxygen Therapy): room air   Weight:              Physical Exam    Lungs clear to auscultation bilaterally and respirations even and unlabored   Abdomen Soft appropriately tender fundus firm   Incision  dressed   Extremities extremities normal, atraumatic, no cyanosis or edema     I reviewed the patient's new clinical results.    Assessment/Plan        HTN in pregnancy, chronic    Pregnancy    Third trimester pregnancy    Assessment & Plan    Assessment:    Nelsy Bill is Day 1  post-partum  , Low Transverse   .      Plan:  continue post op care.      Ada Mata PA-C  22  08:51 EST

## 2022-02-09 NOTE — PLAN OF CARE
Goal Outcome Evaluation:  Plan of Care Reviewed With: patient           Outcome Summary: VSS, I & O adequate, pain controlled with oral medication. positive bonding observed.

## 2022-02-09 NOTE — PROGRESS NOTES
"Patient: Nelsy Bill  Procedure(s):   SECTION REPEAT WITH TUBAL  Anesthesia type: Spinal    Patient location: [facility] Surgical Floor  Last vitals: /68 (BP Location: Left arm, Patient Position: Sitting)   Pulse 105   Temp 98.1 °F (36.7 °C) (Oral)   Resp 16   Ht 167.6 cm (66\")   Wt 107 kg (236 lb)   LMP 2021   SpO2 96%   Breastfeeding Yes   BMI 38.09 kg/m²   Level of consciousness: awake, alert and oriented    Post-anesthesia pain: adequate analgesia  Airway patency: patent  Respiratory: unassisted  Cardiovascular: stable and blood pressure at baseline  Hydration: euvolemic    Anesthetic complications: no  "

## 2022-02-09 NOTE — PLAN OF CARE
Goal Outcome Evaluation:  Plan of Care Reviewed With: patient           Outcome Summary: VSS, some nausea, some pain, PO meds given, bonding with baby and breastfeeding well.

## 2022-02-10 VITALS
DIASTOLIC BLOOD PRESSURE: 79 MMHG | OXYGEN SATURATION: 95 % | HEIGHT: 66 IN | BODY MASS INDEX: 37.93 KG/M2 | RESPIRATION RATE: 18 BRPM | SYSTOLIC BLOOD PRESSURE: 129 MMHG | WEIGHT: 236 LBS | TEMPERATURE: 97.8 F | HEART RATE: 82 BPM

## 2022-02-10 PROCEDURE — 25010000002 TETANUS-DIPHTH-ACELL PERTUSSIS 5-2.5-18.5 LF-MCG/0.5 SUSPENSION PREFILLED SYRINGE: Performed by: OBSTETRICS & GYNECOLOGY

## 2022-02-10 PROCEDURE — 90715 TDAP VACCINE 7 YRS/> IM: CPT | Performed by: OBSTETRICS & GYNECOLOGY

## 2022-02-10 PROCEDURE — 90471 IMMUNIZATION ADMIN: CPT | Performed by: OBSTETRICS & GYNECOLOGY

## 2022-02-10 RX ORDER — DOCUSATE SODIUM 100 MG/1
100 CAPSULE, LIQUID FILLED ORAL 2 TIMES DAILY PRN
Qty: 30 CAPSULE | Refills: 0 | Status: SHIPPED | OUTPATIENT
Start: 2022-02-10 | End: 2022-03-21

## 2022-02-10 RX ORDER — IBUPROFEN 800 MG/1
800 TABLET ORAL EVERY 8 HOURS PRN
Qty: 60 TABLET | Refills: 0 | Status: SHIPPED | OUTPATIENT
Start: 2022-02-10 | End: 2022-03-21

## 2022-02-10 RX ORDER — OXYCODONE HYDROCHLORIDE 5 MG/1
5 TABLET ORAL EVERY 4 HOURS PRN
Qty: 15 TABLET | Refills: 0 | Status: SHIPPED | OUTPATIENT
Start: 2022-02-10 | End: 2022-02-15

## 2022-02-10 RX ORDER — ACETAMINOPHEN 500 MG
1000 TABLET ORAL EVERY 8 HOURS PRN
Qty: 60 TABLET | Refills: 0 | Status: SHIPPED | OUTPATIENT
Start: 2022-02-10 | End: 2022-03-21

## 2022-02-10 RX ADMIN — IBUPROFEN 800 MG: 800 TABLET ORAL at 09:02

## 2022-02-10 RX ADMIN — TETANUS TOXOID, REDUCED DIPHTHERIA TOXOID AND ACELLULAR PERTUSSIS VACCINE, ADSORBED 0.5 ML: 5; 2.5; 8; 8; 2.5 SUSPENSION INTRAMUSCULAR at 09:02

## 2022-02-10 RX ADMIN — ACETAMINOPHEN 1000 MG: 500 TABLET ORAL at 05:41

## 2022-02-10 RX ADMIN — DOCUSATE SODIUM 100 MG: 100 CAPSULE, LIQUID FILLED ORAL at 09:01

## 2022-02-10 RX ADMIN — PRENATAL VITAMINS-IRON FUMARATE 27 MG IRON-FOLIC ACID 0.8 MG TABLET 1 TABLET: at 09:02

## 2022-02-10 RX ADMIN — IBUPROFEN 800 MG: 800 TABLET ORAL at 01:54

## 2022-02-10 RX ADMIN — OXYCODONE 10 MG: 5 TABLET ORAL at 09:03

## 2022-02-10 NOTE — PLAN OF CARE
Goal Outcome Evaluation:              Outcome Summary: VSS, adequate pain control,remains in covid isolation,breastfeeding well, anticipate d/c

## 2022-02-10 NOTE — DISCHARGE SUMMARY
Discharge Summary     Stew Bill  : 1990  MRN: [id number]  CSN: [id number]    Date of Admission: 2022   Date of Discharge:  2/10/2022   Delivering Physician: Swetha Hernandez MD       Admission Diagnosis: 1. Pregnancy [Z34.90]   Discharge Diagnosis: 1. Same as above plus  2. Pregnancy at 38w0d - delivered       Procedures: 1. Repeat  (LTCS) with bilateral partial salpingectomy     Hospital Course  See the completed operative report for details regarding antepartum course and delivery.    Her post-operative course was unremarkable.  On POD # 2 she felt like she was ready for D/C.  She was evaluated and it was determined she was able to be discharged to home.  She had no febrile morbidity. She had normal bowel and bladder function and was hemodynamically stable.  Her wound was healing well without obvious signs of infections. She is breast feeding . She denies any Covid symptoms.    Infant  female  fetus weighing 3005 g (6 lb 10 oz)   Apgars -  8 @ 1 minute /  9 @ 5 minutes.    Discharge labs  Lab Results   Component Value Date    WBC 12.24 (H) 2022    HGB 11.4 (L) 2022    HCT 34.3 2022     2022       Discharge Medications     Discharge Medications      New Medications      Instructions Start Date   acetaminophen 500 MG tablet  Commonly known as: TYLENOL   1,000 mg, Oral, Every 8 Hours PRN      docusate sodium 100 MG capsule   100 mg, Oral, 2 Times Daily PRN      ibuprofen 800 MG tablet  Commonly known as: ADVIL,MOTRIN   800 mg, Oral, Every 8 Hours PRN      oxyCODONE 5 MG immediate release tablet  Commonly known as: ROXICODONE   5 mg, Oral, Every 4 Hours PRN         Continue These Medications      Instructions Start Date   aspirin 81 MG EC tablet   81 mg, Oral, Daily      famotidine 20 MG tablet  Commonly known as: PEPCID   20 mg, Oral, 2 Times Daily      Prenatal 27-1 27-1 MG tablet tablet   1 tablet, Oral, Daily             Discharge  Disposition Home or Self Care   Condition on Discharge: good   Follow-up: 1 week with [facility].     Time spent on discharge: 30 minutes or less  Bailey Larry, APRN  2/10/2022

## 2022-02-11 LAB
LAB AP CASE REPORT: NORMAL
PATH REPORT.FINAL DX SPEC: NORMAL

## 2022-02-15 ENCOUNTER — OFFICE VISIT (OUTPATIENT)
Dept: OBSTETRICS AND GYNECOLOGY | Facility: CLINIC | Age: 32
End: 2022-02-15

## 2022-02-15 VITALS
SYSTOLIC BLOOD PRESSURE: 124 MMHG | WEIGHT: 219.4 LBS | DIASTOLIC BLOOD PRESSURE: 76 MMHG | HEIGHT: 66 IN | BODY MASS INDEX: 35.26 KG/M2

## 2022-02-15 DIAGNOSIS — Z98.891 STATUS POST CESAREAN SECTION ROUTINE POSTPARTUM FOLLOW-UP: Primary | ICD-10-CM

## 2022-02-15 PROCEDURE — 0503F POSTPARTUM CARE VISIT: CPT | Performed by: PHYSICIAN ASSISTANT

## 2022-02-15 NOTE — PROGRESS NOTES
"Subjective   Chief Complaint   Patient presents with   • Post-op     One week post-op C/S with tubal, staples removed and steri-strips applied, Patient states she will removed steri-strips if they start to cause a rash       Nelsy Bill is a 31 y.o. year old  presenting to be seen for poat op visit  Doing well one week post op repeat  section and tubal  Normal bowel and bladder function  Lochia light  Breastfeeding going well  No issues with postpartum blues    Past Medical History:   Diagnosis Date   • HTN in pregnancy, chronic 2021   • Patient denies medical problems    • Positive testing for group B Streptococcus 2018   • Preeclampsia    • Varicella         Current Outpatient Medications:   •  acetaminophen (TYLENOL) 500 MG tablet, Take 2 tablets by mouth Every 8 (Eight) Hours As Needed for Mild Pain ., Disp: 60 tablet, Rfl: 0  •  docusate sodium (COLACE) 100 MG capsule, Take 1 capsule by mouth 2 (Two) Times a Day As Needed for Constipation., Disp: 30 capsule, Rfl: 0  •  ibuprofen (ADVIL,MOTRIN) 800 MG tablet, Take 1 tablet by mouth Every 8 (Eight) Hours As Needed for Mild Pain ., Disp: 60 tablet, Rfl: 0  •  oxyCODONE (ROXICODONE) 5 MG immediate release tablet, Take 1 tablet by mouth Every 4 (Four) Hours As Needed for Moderate Pain, Disp: 15 tablet, Rfl: 0  •  Prenatal Vit-Fe Fumarate-FA (Prenatal 27-1) 27-1 MG tablet tablet, Take 1 tablet by mouth Daily for 270 days., Disp: 90 tablet, Rfl: 3  •  aspirin (aspirin) 81 MG EC tablet, Take 1 tablet by mouth Daily., Disp: 90 tablet, Rfl: 2  •  famotidine (PEPCID) 20 MG tablet, Take 1 tablet by mouth 2 (Two) Times a Day., Disp: 60 tablet, Rfl: 5   Allergies   Allergen Reactions   • Mastisol [Wound Dressing Adhesive] Rash     \"a terrible fire burning, blistered rash\"   • Other Rash     Steri-strips      Past Surgical History:   Procedure Laterality Date   •  SECTION  ,     x's 2   •  SECTION WITH TUBAL " "N/A 2018    Procedure:  SECTION REPEAT;  Surgeon: Swetha Hernandez MD;  Location: [facility] LABOR DELIVERY;  Service: Obstetrics/Gynecology   •  SECTION WITH TUBAL N/A 2022    Procedure:  SECTION REPEAT WITH TUBAL;  Surgeon: Swetha Hernandez MD;  Location: [facility] OR;  Service: Obstetrics/Gynecology;  Laterality: N/A;   • KNEE ACL RECONSTRUCTION     • TUBAL ABDOMINAL LIGATION     • WISDOM TOOTH EXTRACTION        Social History     Socioeconomic History   • Marital status:    Tobacco Use   • Smoking status: Never Smoker   • Smokeless tobacco: Never Used   Vaping Use   • Vaping Use: Never used   Substance and Sexual Activity   • Alcohol use: No   • Drug use: No   • Sexual activity: Yes     Partners: Male     Birth control/protection: Surgical     Comment: Plans bilateral tubal ligation       Family History   Problem Relation Age of Onset   • Prostate cancer Paternal Grandfather    • Cancer Paternal Grandmother    • Lung cancer Maternal Grandmother    • Prostate cancer Maternal Grandfather        Review of Systems   Constitutional: Negative for chills, diaphoresis and fever.   Gastrointestinal: Negative for constipation, diarrhea, nausea and vomiting.   Genitourinary: Negative for difficulty urinating and dysuria.           Objective   /76   Ht 167.6 cm (66\")   Wt 99.5 kg (219 lb 6.4 oz)   LMP 2021   Breastfeeding Yes   BMI 35.41 kg/m²     Physical Exam  Constitutional:       Appearance: Normal appearance. She is well-developed and well-groomed.   Eyes:      General: Lids are normal.      Extraocular Movements: Extraocular movements intact.      Conjunctiva/sclera: Conjunctivae normal.   Abdominal:      Palpations: Abdomen is soft.      Tenderness: There is no abdominal tenderness.      Comments: Incision healing well   Neurological:      Mental Status: She is alert.   Psychiatric:         Attention and Perception: Attention normal.         Mood and Affect: Mood normal.  "        Speech: Speech normal.         Behavior: Behavior is cooperative.            Result Review :                   Assessment and Plan  Diagnoses and all orders for this visit:    1. Status post  section routine postpartum follow-up (Primary)      Patient Instructions   Avoid driving for one more week             This note was electronically signed.    Ada Mata PA-C   February 15, 2022

## 2022-03-21 ENCOUNTER — POSTPARTUM VISIT (OUTPATIENT)
Dept: OBSTETRICS AND GYNECOLOGY | Facility: CLINIC | Age: 32
End: 2022-03-21

## 2022-03-21 VITALS
BODY MASS INDEX: 35.49 KG/M2 | SYSTOLIC BLOOD PRESSURE: 110 MMHG | DIASTOLIC BLOOD PRESSURE: 68 MMHG | HEIGHT: 65 IN | WEIGHT: 213 LBS

## 2022-03-21 DIAGNOSIS — Z12.4 SCREENING FOR MALIGNANT NEOPLASM OF CERVIX: ICD-10-CM

## 2022-03-21 PROBLEM — Z34.90 PREGNANCY: Status: RESOLVED | Noted: 2021-12-28 | Resolved: 2022-03-21

## 2022-03-21 PROBLEM — O10.919 HTN IN PREGNANCY, CHRONIC: Status: RESOLVED | Noted: 2021-09-22 | Resolved: 2022-03-21

## 2022-03-21 PROBLEM — O09.299 HX OF PREECLAMPSIA, PRIOR PREGNANCY, CURRENTLY PREGNANT: Status: RESOLVED | Noted: 2021-09-22 | Resolved: 2022-03-21

## 2022-03-21 PROBLEM — Z98.891 PREVIOUS CESAREAN SECTION: Status: RESOLVED | Noted: 2021-09-22 | Resolved: 2022-03-21

## 2022-03-21 PROBLEM — Z34.93 THIRD TRIMESTER PREGNANCY: Status: RESOLVED | Noted: 2021-12-28 | Resolved: 2022-03-21

## 2022-03-21 PROCEDURE — 0503F POSTPARTUM CARE VISIT: CPT | Performed by: MIDWIFE

## 2022-03-21 NOTE — PROGRESS NOTES
"History  Chief Complaint   Patient presents with   • Postpartum Care     C section w/tubal  22 , last pap was , patient is breastfeeding , No Complaints/concerns         Nelsy Bill is a 31 y.o. year old  presenting to be seen for her postpartum visit.  She had a Repeat  (LTCS) with bilateral partial salpingectomy.    Since delivery she has not been sexually active.   She does not have concerns about post-partum blues/depression.   She is breast feeding .  For ongoing contraception, her plans are tubal ligation.  She has not had a menstrual cycle.         Physical  /68   Ht 165.1 cm (65\")   Wt 96.6 kg (213 lb)   LMP 2021   Breastfeeding Yes   BMI 35.45 kg/m²     General:  well developed; well nourished  no acute distress   Abdomen: soft, non-tender; no masses  incision is healed   Pelvis: External genitalia:  normal appearance of the external genitalia including Bartholin's and Cassandra's glands.  Uterus:  normal size, shape and consistency.  Adnexa:  normal bimanual exam of the adnexa.        Assessment   1. Normal 6 week postpartum exam  2. Contraceptive management     Plan   1. BC options reviewed and compared today: tubal ligation  2. Pap smear done  3. Follow up 1 year    No orders of the defined types were placed in this encounter.         This note was electronically signed.  Bailey Larry, JULIANNE  3/21/2022  "

## 2022-03-30 DIAGNOSIS — Z12.4 SCREENING FOR MALIGNANT NEOPLASM OF CERVIX: ICD-10-CM

## 2023-03-24 ENCOUNTER — HOSPITAL ENCOUNTER (EMERGENCY)
Facility: HOSPITAL | Age: 33
Discharge: HOME | End: 2023-03-24
Attending: EMERGENCY MEDICINE | Admitting: EMERGENCY MEDICINE
Payer: COMMERCIAL

## 2023-03-24 ENCOUNTER — APPOINTMENT (OUTPATIENT)
Dept: GENERAL RADIOLOGY | Facility: HOSPITAL | Age: 33
End: 2023-03-24
Payer: COMMERCIAL

## 2023-03-24 VITALS
BODY MASS INDEX: 35.36 KG/M2 | RESPIRATION RATE: 18 BRPM | HEIGHT: 66 IN | OXYGEN SATURATION: 95 % | TEMPERATURE: 98.4 F | DIASTOLIC BLOOD PRESSURE: 91 MMHG | WEIGHT: 220 LBS | SYSTOLIC BLOOD PRESSURE: 142 MMHG | HEART RATE: 95 BPM

## 2023-03-24 DIAGNOSIS — J98.8 VIRAL RESPIRATORY ILLNESS: Primary | ICD-10-CM

## 2023-03-24 DIAGNOSIS — H60.501 ACUTE OTITIS EXTERNA OF RIGHT EAR, UNSPECIFIED TYPE: ICD-10-CM

## 2023-03-24 DIAGNOSIS — B97.89 VIRAL RESPIRATORY ILLNESS: Primary | ICD-10-CM

## 2023-03-24 LAB
FLUAV SUBTYP SPEC NAA+PROBE: NOT DETECTED
FLUBV RNA ISLT QL NAA+PROBE: NOT DETECTED
S PYO AG THROAT QL: NEGATIVE
SARS-COV-2 RNA RESP QL NAA+PROBE: NOT DETECTED

## 2023-03-24 PROCEDURE — 87880 STREP A ASSAY W/OPTIC: CPT | Performed by: PHYSICIAN ASSISTANT

## 2023-03-24 PROCEDURE — 87636 SARSCOV2 & INF A&B AMP PRB: CPT | Performed by: EMERGENCY MEDICINE

## 2023-03-24 PROCEDURE — 63710000001 DIPHENHYDRAMINE PER 50 MG: Performed by: PHYSICIAN ASSISTANT

## 2023-03-24 PROCEDURE — 71045 X-RAY EXAM CHEST 1 VIEW: CPT

## 2023-03-24 PROCEDURE — 87147 CULTURE TYPE IMMUNOLOGIC: CPT | Performed by: PHYSICIAN ASSISTANT

## 2023-03-24 PROCEDURE — 87081 CULTURE SCREEN ONLY: CPT | Performed by: PHYSICIAN ASSISTANT

## 2023-03-24 PROCEDURE — 99283 EMERGENCY DEPT VISIT LOW MDM: CPT

## 2023-03-24 RX ORDER — OFLOXACIN 3 MG/ML
5 SOLUTION AURICULAR (OTIC) DAILY
Qty: 5 ML | Refills: 0 | Status: SHIPPED | OUTPATIENT
Start: 2023-03-24 | End: 2023-03-24 | Stop reason: SDUPTHER

## 2023-03-24 RX ORDER — OFLOXACIN 3 MG/ML
10 SOLUTION AURICULAR (OTIC) DAILY
Qty: 5 ML | Refills: 0 | Status: SHIPPED | OUTPATIENT
Start: 2023-03-24 | End: 2023-03-31

## 2023-03-24 RX ORDER — LORATADINE 10 MG/1
10 TABLET ORAL DAILY
Qty: 30 TABLET | Refills: 0 | Status: SHIPPED | OUTPATIENT
Start: 2023-03-24 | End: 2023-03-24 | Stop reason: SDUPTHER

## 2023-03-24 RX ORDER — IBUPROFEN 800 MG/1
800 TABLET ORAL ONCE
Status: COMPLETED | OUTPATIENT
Start: 2023-03-24 | End: 2023-03-24

## 2023-03-24 RX ORDER — LORATADINE 10 MG/1
10 TABLET ORAL DAILY
Qty: 30 TABLET | Refills: 0 | Status: SHIPPED | OUTPATIENT
Start: 2023-03-24

## 2023-03-24 RX ORDER — DIPHENHYDRAMINE HCL 25 MG
50 CAPSULE ORAL ONCE
Status: COMPLETED | OUTPATIENT
Start: 2023-03-24 | End: 2023-03-24

## 2023-03-24 RX ADMIN — IBUPROFEN 800 MG: 800 TABLET, FILM COATED ORAL at 16:41

## 2023-03-24 RX ADMIN — DIPHENHYDRAMINE HYDROCHLORIDE 50 MG: 25 CAPSULE ORAL at 17:50

## 2023-03-24 NOTE — ED NOTES
Collected Covid/Flu nasal swab during check in. Specimen location - check in desk w/ Anitha, SANDRA. Jing, Triage RN notified at this time.

## 2023-03-24 NOTE — DISCHARGE INSTRUCTIONS
Use ofloxacin otic drops as prescribed.  Take Claritin as prescribed.  Drink plenty of water.  Take Tylenol and Motrin per directions on the package for fever and symptomatic treatment.  Continue using albuterol inhaler as prescribed as needed for cough and shortness of breath.  Follow-up with your PCP as soon as possible for further outpatient evaluation if symptoms persist.  Return to the ER for new or worsening symptoms or acute concerns.

## 2023-03-24 NOTE — ED PROVIDER NOTES
Subjective:  Chief Complaint:  Cough, sore throat    History of Present Illness:  Patient is a 32-year-old female with history of hypertension and pregnancy, preeclampsia, group B strep presenting to the ER with complaints of cough, fever, sore throat that started yesterday.  Patient states that she did have pneumonia and bronchitis and February and finished azithromycin then.  She states that she felt better until yesterday when she started having symptoms again.  She took Tylenol but otherwise denies over-the-counter treatment prior to arrival.  Patient is breast-feeding but states her infant is 13 months old and she feels she is mainly breast-feeding for comfort at this point.  She states that she would be okay with pumping and dumping for a couple of days if needed to receive medications.  She denies additional symptoms or complaints at this time.      Nurses Notes reviewed and agree, including vitals, allergies, social history and prior medical history.     REVIEW OF SYSTEMS: All systems reviewed and not pertinent unless noted.  Review of Systems   Constitutional: Positive for fatigue and fever.   HENT: Positive for sore throat.    Respiratory: Positive for cough.    Musculoskeletal: Positive for myalgias.   All other systems reviewed and are negative.      Past Medical History:   Diagnosis Date   • HTN in pregnancy, chronic 2021   • Patient denies medical problems    • Positive testing for group B Streptococcus 2018   • Preeclampsia    • Varicella        Allergies:    Mastisol [wound dressing adhesive] and Other      Past Surgical History:   Procedure Laterality Date   •  SECTION  ,     x's 2   •  SECTION WITH TUBAL N/A 2018    Procedure:  SECTION REPEAT;  Surgeon: Swetha Hernandez MD;  Location: [facility] LABOR DELIVERY;  Service: Obstetrics/Gynecology   •  SECTION WITH TUBAL N/A 2022    Procedure:  SECTION REPEAT WITH TUBAL;  Surgeon: Land,  "Swetha MUELLER MD;  Location: [facility];  Service: Obstetrics/Gynecology;  Laterality: N/A;   • KNEE ACL RECONSTRUCTION     • TUBAL ABDOMINAL LIGATION     • WISDOM TOOTH EXTRACTION  2011         Social History     Socioeconomic History   • Marital status:    Tobacco Use   • Smoking status: Never   • Smokeless tobacco: Never   Vaping Use   • Vaping Use: Never used   Substance and Sexual Activity   • Alcohol use: No   • Drug use: No   • Sexual activity: Yes     Partners: Male     Birth control/protection: Surgical     Comment: Plans bilateral tubal ligation          Family History   Problem Relation Age of Onset   • Prostate cancer Paternal Grandfather    • Cancer Paternal Grandmother    • Lung cancer Maternal Grandmother    • Prostate cancer Maternal Grandfather        Objective  Physical Exam:  /91 (BP Location: Left arm, Patient Position: Sitting)   Pulse 95   Temp 98.4 °F (36.9 °C) (Oral)   Resp 18   Ht 167.6 cm (66\")   Wt 99.8 kg (220 lb)   SpO2 95%   Breastfeeding Yes   BMI 35.51 kg/m²      Physical Exam  Vitals and nursing note reviewed.   Constitutional:       General: She is not in acute distress.     Appearance: She is not toxic-appearing.   HENT:      Head: Normocephalic and atraumatic.      Right Ear: Tympanic membrane normal.      Left Ear: Tympanic membrane normal.      Ears:      Comments: Erythema to right ear canal and tenderness with manipulation of ear     Nose: Congestion present.      Mouth/Throat:      Pharynx: Uvula midline. Posterior oropharyngeal erythema present. No uvula swelling.      Tonsils: No tonsillar abscesses. 1+ on the right. 1+ on the left.   Eyes:      Conjunctiva/sclera: Conjunctivae normal.   Cardiovascular:      Rate and Rhythm: Tachycardia present.      Heart sounds: Normal heart sounds.   Pulmonary:      Effort: Pulmonary effort is normal. No respiratory distress.      Breath sounds: Normal breath sounds. No wheezing.   Abdominal:      General: There is no " distension.      Palpations: Abdomen is soft.      Tenderness: There is no abdominal tenderness.   Musculoskeletal:      Cervical back: Normal range of motion and neck supple.   Skin:     General: Skin is warm and dry.   Neurological:      General: No focal deficit present.      Mental Status: She is alert and oriented to person, place, and time.   Psychiatric:         Mood and Affect: Mood normal.         Behavior: Behavior normal.         Procedures    ED Course:    ED Course as of 03/24/23 1755   Fri Mar 24, 2023   1625 Discussed giving Decadron injection but patient is breastfeeding. Advised her studies are not clear on whether or not steroids are safe while breastfeeding. Advised her there is risk for growth suppression and endogenous steroid production. She would like to hold off on this. She would like Benadryl. Advised her there is chance of sleepiness/CNS depression in infant and decreased milk supply. She is okay with that. [AP]      ED Course User Index  [AP] Lauryn Diamond PA-C       Lab Results (last 24 hours)     Procedure Component Value Units Date/Time    COVID-19 and FLU A/B PCR - Swab, Nasopharynx [[id number]]  (Normal) Collected: 03/24/23 1641    Specimen: Swab from Nasopharynx Updated: 03/24/23 1730     COVID19 Not Detected     Influenza A PCR Not Detected     Influenza B PCR Not Detected    Narrative:      Fact sheet for providers: https://www.fda.gov/media/531161/download    Fact sheet for patients: https://www.fda.gov/media/319441/download    Test performed by PCR.    Rapid Strep A Screen - Swab, Throat [[id number]]  (Normal) Collected: 03/24/23 1641    Specimen: Swab from Throat Updated: 03/24/23 1714     Strep A Ag Negative    Beta Strep Culture, Throat - Swab, Throat [[id number]] Collected: 03/24/23 1641    Specimen: Swab from Throat Updated: 03/24/23 1714           No radiology results from the last 24 hrs       MDM  Patient was evaluated in the ER for flulike symptoms x2 days.  She is  febrile with temperature of 100.8 Fahrenheit, tachycardic with heart rate of 120 bpm, but otherwise hemodynamically stable and nontoxic-appearing on exam with oxygen saturation of 98% on room air.  Differential diagnosis includes but is not limited to COVID, flu, other viral respiratory infection, strep pharyngitis, viral pharyngitis, pneumonia, among others.  Initial plan includes COVID/flu swab, strep swab, chest x-ray, treatment with ibuprofen and oral fluids.    COVID, flu, strep are negative.  Throat culture pending.  Chest x-ray is without focal pneumonia per my interpretation.  Patient is agreeable with plan for discharge.  She did asked that I look at her ear.  She states that her right ear has been hurting.  On exam, TM appears normal without erythema or bulging.  She is tender during exam and does have some redness noted to the canal.  Will provide prescription for ofloxacin otic drops.  Also gave prescription for Claritin.  Vitals improved with temp of 98.4 and heart rate of 95 after oral fluids.  Patient was advised on supportive care and advised to follow-up with her PCP for further outpatient evaluation as soon as possible.  Precautions were given for return to the ER for any new or worsening symptoms.    Final diagnoses:   Viral respiratory illness   Acute otitis externa of right ear, unspecified type        Lauryn Diamond, PA-ADELSO  03/24/23 3302

## 2023-03-25 ENCOUNTER — TELEPHONE (OUTPATIENT)
Dept: EMERGENCY DEPT | Facility: HOSPITAL | Age: 33
End: 2023-03-25
Payer: COMMERCIAL

## 2023-03-25 LAB — BACTERIA SPEC AEROBE CULT: ABNORMAL

## 2023-03-25 RX ORDER — PENICILLIN V POTASSIUM 500 MG/1
500 TABLET ORAL 2 TIMES DAILY
Qty: 20 TABLET | Refills: 0 | Status: SHIPPED | OUTPATIENT
Start: 2023-03-25 | End: 2023-04-04

## 2023-09-12 ENCOUNTER — OFFICE VISIT (OUTPATIENT)
Dept: FAMILY MEDICINE CLINIC | Facility: CLINIC | Age: 33
End: 2023-09-12
Payer: COMMERCIAL

## 2023-09-12 VITALS
SYSTOLIC BLOOD PRESSURE: 125 MMHG | OXYGEN SATURATION: 98 % | WEIGHT: 236 LBS | HEART RATE: 72 BPM | HEIGHT: 66 IN | BODY MASS INDEX: 37.93 KG/M2 | DIASTOLIC BLOOD PRESSURE: 80 MMHG

## 2023-09-12 DIAGNOSIS — E66.09 CLASS 2 OBESITY DUE TO EXCESS CALORIES WITHOUT SERIOUS COMORBIDITY WITH BODY MASS INDEX (BMI) OF 38.0 TO 38.9 IN ADULT: ICD-10-CM

## 2023-09-12 DIAGNOSIS — N91.2 AMENORRHEA: ICD-10-CM

## 2023-09-12 DIAGNOSIS — R53.83 FATIGUE, UNSPECIFIED TYPE: ICD-10-CM

## 2023-09-12 DIAGNOSIS — R63.5 WEIGHT GAIN: ICD-10-CM

## 2023-09-12 DIAGNOSIS — Z00.00 ANNUAL PHYSICAL EXAM: Primary | ICD-10-CM

## 2023-09-12 NOTE — PROGRESS NOTES
New Patient History and Physical      Referring Physician: No ref. 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   Amneorrhea since July, typically every 28 days, did have hx of missed periods in her teenager years but none since she was 19 years old  Used to take nexplanon for a while inbetween pregnancies   Tubal 2022  Was breast feeding until about 1.5 months ago   Has gained weight 20lbs in the past year  Has been working out more regularly and trying to eat  and not snacking as much for the past few weeks   Allergies on claritin   PAP , normal,  was non diagnostic   Sleeps well  Normal BM  Normal UOP  Denies depression or anxiety currently       Review of Systems   Gen- No fevers, chills  CV- No chest pain, palpitations  Resp- No cough, dyspnea  GI- No N/V/D, abd pain  Neuro-No dizziness, headaches    Past Medical History:   Past Medical History:   Diagnosis Date    HTN in pregnancy, chronic 2021    Patient denies medical problems     Positive testing for group B Streptococcus 2018    Preeclampsia 2010    Varicella 2000       Past Surgical History:  Past Surgical History:   Procedure Laterality Date     SECTION  ,     x's 2     SECTION WITH TUBAL N/A 2018    Procedure:  SECTION REPEAT;  Surgeon: Swetha Hernandez MD;  Location: [facility] LABOR DELIVERY;  Service: Obstetrics/Gynecology     SECTION WITH TUBAL N/A 2022    Procedure:  SECTION REPEAT WITH TUBAL;  Surgeon: Swetha Hernandez MD;  Location: [facility] OR;  Service: Obstetrics/Gynecology;  Laterality: N/A;    KNEE ACL RECONSTRUCTION      TUBAL ABDOMINAL LIGATION      WISDOM TOOTH EXTRACTION         Family History: family history  "includes Cancer in her paternal grandmother; Lung cancer in her maternal grandmother; Prostate cancer in her maternal grandfather and paternal grandfather.    Social History:  reports that she has never smoked. She has never been exposed to tobacco smoke. She has never used smokeless tobacco. She reports that she does not drink alcohol and does not use drugs.    Medications:    Current Outpatient Medications:     loratadine (CLARITIN) 10 MG tablet, Take 1 tablet by mouth Daily., Disp: 30 tablet, Rfl: 0    Allergies:  Allergies   Allergen Reactions    Mastisol [Wound Dressing Adhesive] Rash     \"a terrible fire burning, blistered rash\"    Other Rash     Steri-strips       Objective     Vital Signs:   Vitals:    09/12/23 1416   BP: 125/80   Pulse: 72   SpO2: 98%   Weight: 107 kg (236 lb)   Height: 167.6 cm (66\")     Body mass index is 38.09 kg/m².    Physical Exam:    Physical Exam  Constitutional:       Appearance: Normal appearance. She is well-developed.   HENT:      Head: Normocephalic and atraumatic.      Right Ear: Tympanic membrane, ear canal and external ear normal.      Left Ear: Tympanic membrane, ear canal and external ear normal.      Nose: Nose normal.      Mouth/Throat:      Pharynx: Uvula midline.   Eyes:      Pupils: Pupils are equal, round, and reactive to light.   Neck:      Thyroid: No thyromegaly.      Vascular: No carotid bruit.   Cardiovascular:      Rate and Rhythm: Normal rate and regular rhythm.      Heart sounds: Normal heart sounds. No murmur heard.    No friction rub. No gallop.   Pulmonary:      Effort: Pulmonary effort is normal.      Breath sounds: Normal breath sounds.   Abdominal:      General: Bowel sounds are normal.      Palpations: Abdomen is soft.      Tenderness: There is no abdominal tenderness.   Musculoskeletal:      Cervical back: Neck supple.   Lymphadenopathy:      Head:      Right side of head: No submental, submandibular, tonsillar, preauricular or posterior auricular " adenopathy.      Left side of head: No submental, submandibular, tonsillar, preauricular or posterior auricular adenopathy.      Cervical: No cervical adenopathy.   Skin:     General: Skin is warm and dry.   Neurological:      General: No focal deficit present.      Mental Status: She is alert and oriented to person, place, and time.   Psychiatric:         Mood and Affect: Mood normal.         Behavior: Behavior normal.       Assessment / Plan     Assessment/Plan:   Problem List Items Addressed This Visit    None  Visit Diagnoses       Annual physical exam    -  Primary    Amenorrhea        Relevant Orders    DHEA-Sulfate    TSH Rfx On Abnormal To Free T4    CBC Auto Differential    Comprehensive Metabolic Panel    FSH & LH    Estradiol    Testosterone    HCG, B-subunit, Quantitative    Progesterone    Weight gain        Fatigue, unspecified type        Relevant Orders    CBC Auto Differential    Comprehensive Metabolic Panel    Vitamin D,25-Hydroxy    Vitamin B12    Folate    Class 2 obesity due to excess calories without serious comorbidity with body mass index (BMI) of 38.0 to 38.9 in adult              -- physical performed today, encouraged clean eating, whole foods, limit processed and sugary foods, exercise 150min/week as tolerated  -- workup as above for amenorrhea  -- Patient's (Body mass index is 38.09 kg/m².) indicates that they are obese (BMI >30) with health related conditions that include none . Weight is unchanged. BMI is is above average; BMI management plan is completed. We discussed portion control, increasing exercise, and pharmacologic options including adipex .   -- if TSH is normal, will start adipex, discussed use, side effects, diet and lifestyle changes with her    Discussed plan of care in detail with pt today; pt verb understanding and agrees.    I have reviewed pertinent health maintenance applicable to this patient.    Follow up:  4 weeks if we start adipex    Electronically signed by  JULIANNE Conner   09/12/2023 14:35 EDT      Please note that portions of this note were completed with a voice recognition program.

## 2023-09-13 LAB
25(OH)D3+25(OH)D2 SERPL-MCNC: 21.8 NG/ML (ref 30–100)
ALBUMIN SERPL-MCNC: 4.5 G/DL (ref 3.5–5.2)
ALBUMIN/GLOB SERPL: 1.8 G/DL
ALP SERPL-CCNC: 87 U/L (ref 39–117)
ALT SERPL-CCNC: 30 U/L (ref 1–33)
AST SERPL-CCNC: 22 U/L (ref 1–32)
BASOPHILS # BLD AUTO: 0.04 10*3/MM3 (ref 0–0.2)
BASOPHILS NFR BLD AUTO: 0.5 % (ref 0–1.5)
BILIRUB SERPL-MCNC: 0.3 MG/DL (ref 0–1.2)
BUN SERPL-MCNC: 10 MG/DL (ref 6–20)
BUN/CREAT SERPL: 15.9 (ref 7–25)
CALCIUM SERPL-MCNC: 9.9 MG/DL (ref 8.6–10.5)
CHLORIDE SERPL-SCNC: 107 MMOL/L (ref 98–107)
CO2 SERPL-SCNC: 21.7 MMOL/L (ref 22–29)
CREAT SERPL-MCNC: 0.63 MG/DL (ref 0.57–1)
DHEA-S SERPL-MCNC: 137 UG/DL (ref 84.8–378)
EGFRCR SERPLBLD CKD-EPI 2021: 121 ML/MIN/1.73
EOSINOPHIL # BLD AUTO: 0.45 10*3/MM3 (ref 0–0.4)
EOSINOPHIL NFR BLD AUTO: 5.8 % (ref 0.3–6.2)
ERYTHROCYTE [DISTWIDTH] IN BLOOD BY AUTOMATED COUNT: 13 % (ref 12.3–15.4)
ESTRADIOL SERPL-MCNC: 196 PG/ML
FOLATE SERPL-MCNC: 5.45 NG/ML (ref 4.78–24.2)
FSH SERPL-ACNC: 1.9 MIU/ML
GLOBULIN SER CALC-MCNC: 2.5 GM/DL
GLUCOSE SERPL-MCNC: 89 MG/DL (ref 65–99)
HCG INTACT+B SERPL-ACNC: <1 MIU/ML
HCT VFR BLD AUTO: 44 % (ref 34–46.6)
HGB BLD-MCNC: 15.1 G/DL (ref 12–15.9)
IMM GRANULOCYTES # BLD AUTO: 0.02 10*3/MM3 (ref 0–0.05)
IMM GRANULOCYTES NFR BLD AUTO: 0.3 % (ref 0–0.5)
LH SERPL-ACNC: 2.3 MIU/ML
LYMPHOCYTES # BLD AUTO: 1.91 10*3/MM3 (ref 0.7–3.1)
LYMPHOCYTES NFR BLD AUTO: 24.8 % (ref 19.6–45.3)
MCH RBC QN AUTO: 30.3 PG (ref 26.6–33)
MCHC RBC AUTO-ENTMCNC: 34.3 G/DL (ref 31.5–35.7)
MCV RBC AUTO: 88.2 FL (ref 79–97)
MONOCYTES # BLD AUTO: 0.58 10*3/MM3 (ref 0.1–0.9)
MONOCYTES NFR BLD AUTO: 7.5 % (ref 5–12)
NEUTROPHILS # BLD AUTO: 4.71 10*3/MM3 (ref 1.7–7)
NEUTROPHILS NFR BLD AUTO: 61.1 % (ref 42.7–76)
NRBC BLD AUTO-RTO: 0 /100 WBC (ref 0–0.2)
PLATELET # BLD AUTO: 364 10*3/MM3 (ref 140–450)
POTASSIUM SERPL-SCNC: 4.6 MMOL/L (ref 3.5–5.2)
PROGEST SERPL-MCNC: 10.4 NG/ML
PROT SERPL-MCNC: 7 G/DL (ref 6–8.5)
RBC # BLD AUTO: 4.99 10*6/MM3 (ref 3.77–5.28)
SODIUM SERPL-SCNC: 140 MMOL/L (ref 136–145)
TESTOST SERPL-MCNC: 26 NG/DL (ref 8–60)
TSH SERPL DL<=0.005 MIU/L-ACNC: 3.77 UIU/ML (ref 0.27–4.2)
VIT B12 SERPL-MCNC: 356 PG/ML (ref 211–946)
WBC # BLD AUTO: 7.71 10*3/MM3 (ref 3.4–10.8)

## 2023-09-14 DIAGNOSIS — E66.09 CLASS 2 OBESITY DUE TO EXCESS CALORIES WITHOUT SERIOUS COMORBIDITY WITH BODY MASS INDEX (BMI) OF 38.0 TO 38.9 IN ADULT: Primary | ICD-10-CM

## 2023-09-14 RX ORDER — PHENTERMINE HYDROCHLORIDE 37.5 MG/1
37.5 TABLET ORAL
Qty: 30 TABLET | Refills: 0 | Status: SHIPPED | OUTPATIENT
Start: 2023-09-14

## 2023-09-15 ENCOUNTER — PATIENT ROUNDING (BHMG ONLY) (OUTPATIENT)
Dept: FAMILY MEDICINE CLINIC | Facility: CLINIC | Age: 33
End: 2023-09-15
Payer: COMMERCIAL

## 2023-10-05 ENCOUNTER — DOCUMENTATION (OUTPATIENT)
Dept: FAMILY MEDICINE CLINIC | Facility: CLINIC | Age: 33
End: 2023-10-05
Payer: COMMERCIAL

## 2023-10-05 RX ORDER — DICLOFENAC SODIUM 75 MG/1
75 TABLET, DELAYED RELEASE ORAL 2 TIMES DAILY
Qty: 60 TABLET | Refills: 0 | Status: SHIPPED | OUTPATIENT
Start: 2023-10-05

## 2023-10-05 RX ORDER — METHOCARBAMOL 750 MG/1
750 TABLET, FILM COATED ORAL 3 TIMES DAILY
Qty: 30 TABLET | Refills: 0 | Status: SHIPPED | OUTPATIENT
Start: 2023-10-05

## 2023-10-18 ENCOUNTER — OFFICE VISIT (OUTPATIENT)
Dept: FAMILY MEDICINE CLINIC | Facility: CLINIC | Age: 33
End: 2023-10-18
Payer: COMMERCIAL

## 2023-10-18 VITALS
SYSTOLIC BLOOD PRESSURE: 110 MMHG | HEART RATE: 96 BPM | DIASTOLIC BLOOD PRESSURE: 80 MMHG | WEIGHT: 220 LBS | HEIGHT: 66 IN | OXYGEN SATURATION: 98 % | BODY MASS INDEX: 35.36 KG/M2

## 2023-10-18 DIAGNOSIS — M54.41 ACUTE RIGHT-SIDED LOW BACK PAIN WITH RIGHT-SIDED SCIATICA: ICD-10-CM

## 2023-10-18 DIAGNOSIS — E66.09 CLASS 2 OBESITY DUE TO EXCESS CALORIES WITHOUT SERIOUS COMORBIDITY WITH BODY MASS INDEX (BMI) OF 38.0 TO 38.9 IN ADULT: Primary | ICD-10-CM

## 2023-10-18 RX ORDER — DICLOFENAC SODIUM 75 MG/1
75 TABLET, DELAYED RELEASE ORAL 2 TIMES DAILY
Qty: 60 TABLET | Refills: 1 | Status: SHIPPED | OUTPATIENT
Start: 2023-10-18

## 2023-10-18 RX ORDER — METHOCARBAMOL 750 MG/1
750 TABLET, FILM COATED ORAL 3 TIMES DAILY
Qty: 45 TABLET | Refills: 1 | Status: SHIPPED | OUTPATIENT
Start: 2023-10-18

## 2023-10-18 RX ORDER — PHENTERMINE HYDROCHLORIDE 37.5 MG/1
37.5 TABLET ORAL
Qty: 30 TABLET | Refills: 2 | Status: SHIPPED | OUTPATIENT
Start: 2023-10-18

## 2023-10-18 RX ORDER — DEXAMETHASONE SODIUM PHOSPHATE 10 MG/ML
5 INJECTION INTRAMUSCULAR; INTRAVENOUS ONCE
Status: COMPLETED | OUTPATIENT
Start: 2023-10-18 | End: 2023-10-18

## 2023-10-18 RX ADMIN — DEXAMETHASONE SODIUM PHOSPHATE 5 MG: 10 INJECTION INTRAMUSCULAR; INTRAVENOUS at 15:26

## 2023-10-18 NOTE — PROGRESS NOTES
"      Subjective     Chief Complaint:    Chief Complaint   Patient presents with    Weight Check       History of Present Illness:   F/u on obesity, currently on adipex no side effects, is eating less portions and is exercising, eating more protein, not eating late or snacking on as many carbs as before   Her low back has been flared with pain in her right glute x several weeks that started after intense squats exercises. Has been taking volaten and robaxin with some relief, sitting makes it worse, wakes up in the early morning with pain, chiro did not help, has been doing inversion table, stretches, tens unit       Review of Systems  Gen- No fevers, chills  CV- No chest pain, palpitations  Resp- No cough, dyspnea  GI- No N/V/D, abd pain  Neuro-No dizziness, headaches      I have reviewed and/or updated the patient's past medical, surgical, family, social history and problem list as appropriate.     Medications:    Current Outpatient Medications:     diclofenac (VOLTAREN) 75 MG EC tablet, Take 1 tablet by mouth 2 (Two) Times a Day., Disp: 60 tablet, Rfl: 1    loratadine (CLARITIN) 10 MG tablet, Take 1 tablet by mouth Daily., Disp: 30 tablet, Rfl: 0    methocarbamol (ROBAXIN) 750 MG tablet, Take 1 tablet by mouth 3 (Three) Times a Day., Disp: 45 tablet, Rfl: 1    phentermine (Adipex-P) 37.5 MG tablet, Take 1 tablet by mouth Every Morning Before Breakfast., Disp: 30 tablet, Rfl: 2    Current Facility-Administered Medications:     dexAMETHasone (DECADRON) injection 5 mg, 5 mg, Intramuscular, Once, Africa Lowe APRN    Allergies:  Allergies   Allergen Reactions    Mastisol [Wound Dressing Adhesive] Rash     \"a terrible fire burning, blistered rash\"    Other Rash     Steri-strips       Objective     Vital Signs:   Vitals:    10/18/23 1432   BP: 110/80   Pulse: 96   SpO2: 98%   Weight: 99.8 kg (220 lb)   Height: 167.6 cm (66\")     Body mass index is 35.51 kg/m².    Physical Exam:    Physical Exam  Vitals and nursing " note reviewed.   Constitutional:       Appearance: She is well-developed.   HENT:      Head: Normocephalic and atraumatic.   Eyes:      Pupils: Pupils are equal, round, and reactive to light.   Cardiovascular:      Rate and Rhythm: Normal rate and regular rhythm.      Heart sounds: Normal heart sounds.   Pulmonary:      Effort: Pulmonary effort is normal.      Breath sounds: Normal breath sounds.   Abdominal:      General: Bowel sounds are normal. There is no distension.      Palpations: Abdomen is soft.      Tenderness: There is no abdominal tenderness.   Musculoskeletal:      Cervical back: Neck supple.      Lumbar back: Tenderness (right glute) present. Positive right straight leg raise test.   Skin:     General: Skin is warm and dry.   Neurological:      General: No focal deficit present.      Mental Status: She is alert and oriented to person, place, and time.   Psychiatric:         Mood and Affect: Mood normal.         Behavior: Behavior normal.         Assessment / Plan     Assessment/Plan:   Problem List Items Addressed This Visit    None  Visit Diagnoses       Class 2 obesity due to excess calories without serious comorbidity with body mass index (BMI) of 38.0 to 38.9 in adult    -  Primary    Relevant Medications    phentermine (Adipex-P) 37.5 MG tablet    Acute right-sided low back pain with right-sided sciatica        Relevant Medications    methocarbamol (ROBAXIN) 750 MG tablet    diclofenac (VOLTAREN) 75 MG EC tablet    dexAMETHasone (DECADRON) injection 5 mg (Start on 10/18/2023  2:53 PM)          -- doing very well on adipex, continue diet and exercise  -- dex injection, prn volataren and robaxin     Discussed plan of care in detail with pt today; pt verb understanding and agrees.    Follow up:  2-3 months     Electronically signed by JULIANNE Conner   10/18/2023 14:40 EDT      Please note that portions of this note were completed with a voice recognition program.

## 2023-10-22 ENCOUNTER — DOCUMENTATION (OUTPATIENT)
Dept: FAMILY MEDICINE CLINIC | Facility: CLINIC | Age: 33
End: 2023-10-22
Payer: COMMERCIAL

## 2023-10-22 DIAGNOSIS — M54.41 ACUTE RIGHT-SIDED LOW BACK PAIN WITH RIGHT-SIDED SCIATICA: Primary | ICD-10-CM

## 2023-10-22 DIAGNOSIS — M54.41 ACUTE RIGHT-SIDED LOW BACK PAIN WITH RIGHT-SIDED SCIATICA: ICD-10-CM

## 2023-10-22 RX ORDER — METHYLPREDNISOLONE 4 MG/1
TABLET ORAL
Qty: 21 TABLET | Refills: 0 | Status: SHIPPED | OUTPATIENT
Start: 2023-10-22 | End: 2023-10-30

## 2023-10-22 RX ORDER — TRAMADOL HYDROCHLORIDE 50 MG/1
50 TABLET ORAL EVERY 8 HOURS PRN
Qty: 25 TABLET | Refills: 0 | Status: SHIPPED | OUTPATIENT
Start: 2023-10-22

## 2023-10-22 RX ORDER — TRAMADOL HYDROCHLORIDE 50 MG/1
50 TABLET ORAL EVERY 8 HOURS PRN
Qty: 25 TABLET | Refills: 0 | Status: SHIPPED | OUTPATIENT
Start: 2023-10-22 | End: 2023-10-22 | Stop reason: SDUPTHER

## 2023-10-25 ENCOUNTER — HOSPITAL ENCOUNTER (OUTPATIENT)
Dept: GENERAL RADIOLOGY | Facility: HOSPITAL | Age: 33
Discharge: HOME | End: 2023-10-25
Admitting: NURSE PRACTITIONER
Payer: COMMERCIAL

## 2023-10-25 DIAGNOSIS — M54.41 ACUTE RIGHT-SIDED LOW BACK PAIN WITH RIGHT-SIDED SCIATICA: ICD-10-CM

## 2023-10-25 PROCEDURE — 72114 X-RAY EXAM L-S SPINE BENDING: CPT

## 2023-10-30 DIAGNOSIS — M54.41 ACUTE RIGHT-SIDED LOW BACK PAIN WITH RIGHT-SIDED SCIATICA: Primary | ICD-10-CM

## 2023-10-30 RX ORDER — GABAPENTIN 100 MG/1
CAPSULE ORAL
Qty: 60 CAPSULE | Refills: 1 | Status: SHIPPED | OUTPATIENT
Start: 2023-10-30

## 2023-10-30 RX ORDER — TIZANIDINE 4 MG/1
4 TABLET ORAL NIGHTLY PRN
Qty: 30 TABLET | Refills: 1 | Status: SHIPPED | OUTPATIENT
Start: 2023-10-30

## 2023-10-31 ENCOUNTER — OFFICE VISIT (OUTPATIENT)
Dept: FAMILY MEDICINE CLINIC | Facility: CLINIC | Age: 33
End: 2023-10-31
Payer: COMMERCIAL

## 2023-10-31 VITALS
BODY MASS INDEX: 34.87 KG/M2 | HEIGHT: 66 IN | WEIGHT: 217 LBS | HEART RATE: 108 BPM | DIASTOLIC BLOOD PRESSURE: 85 MMHG | OXYGEN SATURATION: 99 % | SYSTOLIC BLOOD PRESSURE: 120 MMHG

## 2023-10-31 DIAGNOSIS — R20.0 SADDLE ANESTHESIA: ICD-10-CM

## 2023-10-31 DIAGNOSIS — M54.16 LUMBAR RADICULOPATHY: Primary | ICD-10-CM

## 2023-10-31 DIAGNOSIS — M54.41 ACUTE RIGHT-SIDED LOW BACK PAIN WITH RIGHT-SIDED SCIATICA: ICD-10-CM

## 2023-10-31 PROCEDURE — 99213 OFFICE O/P EST LOW 20 MIN: CPT | Performed by: NURSE PRACTITIONER

## 2023-10-31 RX ORDER — TRAMADOL HYDROCHLORIDE 50 MG/1
50 TABLET ORAL EVERY 8 HOURS PRN
Qty: 25 TABLET | Refills: 0 | Status: SHIPPED | OUTPATIENT
Start: 2023-10-31

## 2023-10-31 NOTE — PROGRESS NOTES
Subjective     Chief Complaint:    Chief Complaint   Patient presents with    Back Pain     Pt sts runs down R leg and hip       History of Present Illness:   Right sided low back pain x 8 weeks, worsening, radiates down her entire right leg, ankle aches, right hip hurts. No known injury. Opain is severe. Will wake her up in the middle of the night with worsening pain, prolonged sitting, prolonged laying makes it worse. Standing and some strecthing help a little. Ice helps lower leg pain. TENS helps some. She has been taking nsaids, steroids, muscle relaxers without improvement. Has been doing home structured PT exercises without improvement. Has upcoming appt with formal PT. Tramadol helps some. Irasema was sent yesterday but has not started yet  Some groin/pelvic area numbness with prolong sitting. No fecal incontinence. Not increase in urinary urgency but no incontinence     Xray with mild DD changes    No hx of prior back pain    Review of Systems  Gen- No fevers, chills  CV- No chest pain, palpitations  Resp- No cough, dyspnea  GI- No N/V/D, abd pain  Neuro-No dizziness, headaches      I have reviewed and/or updated the patient's past medical, surgical, family, social history and problem list as appropriate.     Medications:    Current Outpatient Medications:     diclofenac (VOLTAREN) 75 MG EC tablet, Take 1 tablet by mouth 2 (Two) Times a Day., Disp: 60 tablet, Rfl: 1    gabapentin (NEURONTIN) 100 MG capsule, 1-3 caps po BID prn back pain, Disp: 60 capsule, Rfl: 1    loratadine (CLARITIN) 10 MG tablet, Take 1 tablet by mouth Daily., Disp: 30 tablet, Rfl: 0    phentermine (Adipex-P) 37.5 MG tablet, Take 1 tablet by mouth Every Morning Before Breakfast., Disp: 30 tablet, Rfl: 2    tiZANidine (Zanaflex) 4 MG tablet, Take 1 tablet by mouth At Night As Needed for Muscle Spasms., Disp: 30 tablet, Rfl: 1    traMADol (ULTRAM) 50 MG tablet, Take 1 tablet by mouth Every 8 (Eight) Hours As Needed for Moderate Pain.,  "Disp: 25 tablet, Rfl: 0    Allergies:  Allergies   Allergen Reactions    Mastisol [Wound Dressing Adhesive] Rash     \"a terrible fire burning, blistered rash\"    Other Rash     Steri-strips       Objective     Vital Signs:   Vitals:    10/31/23 1526   BP: 120/85   Pulse: 108   SpO2: 99%   Weight: 98.4 kg (217 lb)   Height: 167.6 cm (66\")     Body mass index is 35.02 kg/m².    Physical Exam:    Physical Exam  Vitals and nursing note reviewed.   Constitutional:       Appearance: She is well-developed.   HENT:      Head: Normocephalic and atraumatic.   Eyes:      Pupils: Pupils are equal, round, and reactive to light.   Cardiovascular:      Rate and Rhythm: Normal rate and regular rhythm.      Heart sounds: Normal heart sounds.   Pulmonary:      Effort: Pulmonary effort is normal.      Breath sounds: Normal breath sounds.   Abdominal:      General: Bowel sounds are normal. There is no distension.      Palpations: Abdomen is soft.      Tenderness: There is no abdominal tenderness.   Musculoskeletal:      Cervical back: Neck supple. No crepitus.      Thoracic back: No spasms or tenderness.      Lumbar back: No spasms or tenderness. Positive right straight leg raise test (at 30 degrees).      Comments: Pain with external and internal rotation   Skin:     General: Skin is warm and dry.   Neurological:      General: No focal deficit present.      Mental Status: She is alert and oriented to person, place, and time.   Psychiatric:         Mood and Affect: Mood normal.         Behavior: Behavior normal.         Assessment / Plan     Assessment/Plan:   Problem List Items Addressed This Visit    None  Visit Diagnoses       Lumbar radiculopathy    -  Primary    Relevant Orders    MRI Lumbar Spine Without Contrast    Acute right-sided low back pain with right-sided sciatica        Relevant Medications    traMADol (ULTRAM) 50 MG tablet    Other Relevant Orders    MRI Lumbar Spine Without Contrast    Saddle anesthesia        " Relevant Orders    MRI Lumbar Spine Without Contrast          -- urgent MRI due to symptoms  -- continue nsaid, msk relaxer, bart and tramadol     Discussed plan of care in detail with pt today; pt verb understanding and agrees.    Follow up:  Pending MRI results     Electronically signed by JULIANNE Conner   10/31/2023 15:34 EDT      Please note that portions of this note were completed with a voice recognition program.

## 2023-11-01 ENCOUNTER — HOSPITAL ENCOUNTER (OUTPATIENT)
Dept: MRI IMAGING | Facility: HOSPITAL | Age: 33
Discharge: HOME | End: 2023-11-01
Admitting: NURSE PRACTITIONER
Payer: COMMERCIAL

## 2023-11-01 DIAGNOSIS — M54.16 LUMBAR RADICULOPATHY: ICD-10-CM

## 2023-11-01 DIAGNOSIS — R93.7 ABNORMAL MRI, LUMBAR SPINE: Primary | ICD-10-CM

## 2023-11-01 DIAGNOSIS — R20.0 SADDLE ANESTHESIA: ICD-10-CM

## 2023-11-01 DIAGNOSIS — M54.41 ACUTE RIGHT-SIDED LOW BACK PAIN WITH RIGHT-SIDED SCIATICA: ICD-10-CM

## 2023-11-01 DIAGNOSIS — M54.16 LUMBAR NERVE ROOT COMPRESSION: ICD-10-CM

## 2023-11-01 PROCEDURE — 72148 MRI LUMBAR SPINE W/O DYE: CPT

## 2023-11-14 ENCOUNTER — HOSPITAL ENCOUNTER (OUTPATIENT)
Facility: HOSPITAL | Age: 33
Setting detail: OBSERVATION
LOS: 1 days | Discharge: HOME | End: 2023-11-16
Attending: INTERNAL MEDICINE | Admitting: HOSPITALIST
Payer: COMMERCIAL

## 2023-11-14 ENCOUNTER — OFFICE VISIT (OUTPATIENT)
Dept: NEUROSURGERY | Facility: CLINIC | Age: 33
End: 2023-11-14
Payer: COMMERCIAL

## 2023-11-14 ENCOUNTER — HOSPITAL ENCOUNTER (OUTPATIENT)
Facility: HOSPITAL | Age: 33
Setting detail: HOSPITAL OUTPATIENT SURGERY
End: 2023-11-14
Attending: STUDENT IN AN ORGANIZED HEALTH CARE EDUCATION/TRAINING PROGRAM | Admitting: STUDENT IN AN ORGANIZED HEALTH CARE EDUCATION/TRAINING PROGRAM
Payer: COMMERCIAL

## 2023-11-14 VITALS
WEIGHT: 219.2 LBS | DIASTOLIC BLOOD PRESSURE: 88 MMHG | TEMPERATURE: 97.7 F | HEIGHT: 66 IN | BODY MASS INDEX: 35.23 KG/M2 | SYSTOLIC BLOOD PRESSURE: 128 MMHG

## 2023-11-14 DIAGNOSIS — M54.16 LUMBAR RADICULOPATHY: Primary | ICD-10-CM

## 2023-11-14 DIAGNOSIS — M51.26 LUMBAR HERNIATED DISC: Primary | ICD-10-CM

## 2023-11-14 DIAGNOSIS — M51.26 HNP (HERNIATED NUCLEUS PULPOSUS), LUMBAR: Primary | ICD-10-CM

## 2023-11-14 LAB
ALBUMIN SERPL-MCNC: 4.7 G/DL (ref 3.5–5.2)
ALBUMIN/GLOB SERPL: 1.6 G/DL
ALP SERPL-CCNC: 103 U/L (ref 39–117)
ALT SERPL W P-5'-P-CCNC: 31 U/L (ref 1–33)
ANION GAP SERPL CALCULATED.3IONS-SCNC: 10 MMOL/L (ref 5–15)
AST SERPL-CCNC: 23 U/L (ref 1–32)
BASOPHILS # BLD AUTO: 0.04 10*3/MM3 (ref 0–0.2)
BASOPHILS NFR BLD AUTO: 0.5 % (ref 0–1.5)
BILIRUB SERPL-MCNC: 0.3 MG/DL (ref 0–1.2)
BUN SERPL-MCNC: 13 MG/DL (ref 6–20)
BUN/CREAT SERPL: 15.1 (ref 7–25)
CALCIUM SPEC-SCNC: 9.5 MG/DL (ref 8.6–10.5)
CHLORIDE SERPL-SCNC: 102 MMOL/L (ref 98–107)
CO2 SERPL-SCNC: 28 MMOL/L (ref 22–29)
CREAT SERPL-MCNC: 0.86 MG/DL (ref 0.57–1)
DEPRECATED RDW RBC AUTO: 40.4 FL (ref 37–54)
EGFRCR SERPLBLD CKD-EPI 2021: 92.2 ML/MIN/1.73
EOSINOPHIL # BLD AUTO: 0.23 10*3/MM3 (ref 0–0.4)
EOSINOPHIL NFR BLD AUTO: 3 % (ref 0.3–6.2)
ERYTHROCYTE [DISTWIDTH] IN BLOOD BY AUTOMATED COUNT: 12.4 % (ref 12.3–15.4)
GLOBULIN UR ELPH-MCNC: 3 GM/DL
GLUCOSE SERPL-MCNC: 142 MG/DL (ref 65–99)
HBA1C MFR BLD: 5.2 % (ref 4.8–5.6)
HCT VFR BLD AUTO: 44 % (ref 34–46.6)
HGB BLD-MCNC: 14.7 G/DL (ref 12–15.9)
IMM GRANULOCYTES # BLD AUTO: 0.02 10*3/MM3 (ref 0–0.05)
IMM GRANULOCYTES NFR BLD AUTO: 0.3 % (ref 0–0.5)
LYMPHOCYTES # BLD AUTO: 2.11 10*3/MM3 (ref 0.7–3.1)
LYMPHOCYTES NFR BLD AUTO: 27.6 % (ref 19.6–45.3)
MAGNESIUM SERPL-MCNC: 2.2 MG/DL (ref 1.6–2.6)
MCH RBC QN AUTO: 30.2 PG (ref 26.6–33)
MCHC RBC AUTO-ENTMCNC: 33.4 G/DL (ref 31.5–35.7)
MCV RBC AUTO: 90.3 FL (ref 79–97)
MONOCYTES # BLD AUTO: 0.45 10*3/MM3 (ref 0.1–0.9)
MONOCYTES NFR BLD AUTO: 5.9 % (ref 5–12)
NEUTROPHILS NFR BLD AUTO: 4.79 10*3/MM3 (ref 1.7–7)
NEUTROPHILS NFR BLD AUTO: 62.7 % (ref 42.7–76)
NRBC BLD AUTO-RTO: 0 /100 WBC (ref 0–0.2)
PLATELET # BLD AUTO: 390 10*3/MM3 (ref 140–450)
PMV BLD AUTO: 9.3 FL (ref 6–12)
POTASSIUM SERPL-SCNC: 3.5 MMOL/L (ref 3.5–5.2)
PROT SERPL-MCNC: 7.7 G/DL (ref 6–8.5)
RBC # BLD AUTO: 4.87 10*6/MM3 (ref 3.77–5.28)
SODIUM SERPL-SCNC: 140 MMOL/L (ref 136–145)
WBC NRBC COR # BLD: 7.64 10*3/MM3 (ref 3.4–10.8)

## 2023-11-14 PROCEDURE — 96374 THER/PROPH/DIAG INJ IV PUSH: CPT

## 2023-11-14 PROCEDURE — 63710000001 DEXAMETHASONE PER 0.25 MG: Performed by: STUDENT IN AN ORGANIZED HEALTH CARE EDUCATION/TRAINING PROGRAM

## 2023-11-14 PROCEDURE — 63710000001 DEXAMETHASONE PER 0.25 MG: Performed by: NURSE PRACTITIONER

## 2023-11-14 PROCEDURE — 25010000002 HYDROMORPHONE PER 4 MG: Performed by: STUDENT IN AN ORGANIZED HEALTH CARE EDUCATION/TRAINING PROGRAM

## 2023-11-14 PROCEDURE — 99232 SBSQ HOSP IP/OBS MODERATE 35: CPT | Performed by: NURSE PRACTITIONER

## 2023-11-14 PROCEDURE — 99215 OFFICE O/P EST HI 40 MIN: CPT | Performed by: PHYSICIAN ASSISTANT

## 2023-11-14 PROCEDURE — 83735 ASSAY OF MAGNESIUM: CPT | Performed by: NURSE PRACTITIONER

## 2023-11-14 PROCEDURE — 80053 COMPREHEN METABOLIC PANEL: CPT | Performed by: NURSE PRACTITIONER

## 2023-11-14 PROCEDURE — 25010000002 HYDROMORPHONE PER 4 MG: Performed by: NURSE PRACTITIONER

## 2023-11-14 PROCEDURE — 85025 COMPLETE CBC W/AUTO DIFF WBC: CPT | Performed by: NURSE PRACTITIONER

## 2023-11-14 PROCEDURE — 83036 HEMOGLOBIN GLYCOSYLATED A1C: CPT | Performed by: NURSE PRACTITIONER

## 2023-11-14 RX ORDER — POTASSIUM CHLORIDE 20 MEQ/1
40 TABLET, EXTENDED RELEASE ORAL EVERY 4 HOURS
Status: COMPLETED | OUTPATIENT
Start: 2023-11-14 | End: 2023-11-15

## 2023-11-14 RX ORDER — CALCIUM CARBONATE 500 MG/1
2 TABLET, CHEWABLE ORAL 2 TIMES DAILY PRN
Status: DISCONTINUED | OUTPATIENT
Start: 2023-11-14 | End: 2023-11-16 | Stop reason: HOSPADM

## 2023-11-14 RX ORDER — HYDROCODONE BITARTRATE AND ACETAMINOPHEN 5; 325 MG/1; MG/1
1 TABLET ORAL EVERY 12 HOURS PRN
Qty: 12 TABLET | Refills: 0 | Status: SHIPPED | OUTPATIENT
Start: 2023-11-14

## 2023-11-14 RX ORDER — GABAPENTIN 400 MG/1
400 CAPSULE ORAL 3 TIMES DAILY
Qty: 90 CAPSULE | Refills: 0 | Status: SHIPPED | OUTPATIENT
Start: 2023-11-14

## 2023-11-14 RX ORDER — ONDANSETRON 4 MG/1
4 TABLET, FILM COATED ORAL EVERY 6 HOURS PRN
Status: DISCONTINUED | OUTPATIENT
Start: 2023-11-14 | End: 2023-11-16 | Stop reason: HOSPADM

## 2023-11-14 RX ORDER — METHOCARBAMOL 750 MG/1
750 TABLET, FILM COATED ORAL
COMMUNITY
Start: 2023-11-03

## 2023-11-14 RX ORDER — ACETAMINOPHEN 650 MG/1
650 SUPPOSITORY RECTAL EVERY 4 HOURS PRN
Status: DISCONTINUED | OUTPATIENT
Start: 2023-11-14 | End: 2023-11-16 | Stop reason: HOSPADM

## 2023-11-14 RX ORDER — SODIUM CHLORIDE 0.9 % (FLUSH) 0.9 %
10 SYRINGE (ML) INJECTION AS NEEDED
Status: DISCONTINUED | OUTPATIENT
Start: 2023-11-14 | End: 2023-11-16 | Stop reason: HOSPADM

## 2023-11-14 RX ORDER — METHYLPREDNISOLONE 4 MG/1
TABLET ORAL
Qty: 21 TABLET | Refills: 0 | Status: ON HOLD | OUTPATIENT
Start: 2023-11-14 | End: 2023-11-14

## 2023-11-14 RX ORDER — NALOXONE HCL 0.4 MG/ML
0.4 VIAL (ML) INJECTION
Status: DISCONTINUED | OUTPATIENT
Start: 2023-11-14 | End: 2023-11-16 | Stop reason: HOSPADM

## 2023-11-14 RX ORDER — DEXAMETHASONE 4 MG/1
4 TABLET ORAL EVERY 6 HOURS SCHEDULED
Status: DISCONTINUED | OUTPATIENT
Start: 2023-11-14 | End: 2023-11-16 | Stop reason: HOSPADM

## 2023-11-14 RX ORDER — HYDROMORPHONE HYDROCHLORIDE 1 MG/ML
0.5 INJECTION, SOLUTION INTRAMUSCULAR; INTRAVENOUS; SUBCUTANEOUS
Status: DISCONTINUED | OUTPATIENT
Start: 2023-11-14 | End: 2023-11-16 | Stop reason: HOSPADM

## 2023-11-14 RX ORDER — ONDANSETRON 2 MG/ML
4 INJECTION INTRAMUSCULAR; INTRAVENOUS EVERY 6 HOURS PRN
Status: DISCONTINUED | OUTPATIENT
Start: 2023-11-14 | End: 2023-11-16 | Stop reason: HOSPADM

## 2023-11-14 RX ORDER — ACETAMINOPHEN 160 MG/5ML
650 SOLUTION ORAL EVERY 4 HOURS PRN
Status: DISCONTINUED | OUTPATIENT
Start: 2023-11-14 | End: 2023-11-16 | Stop reason: HOSPADM

## 2023-11-14 RX ORDER — SODIUM CHLORIDE 0.9 % (FLUSH) 0.9 %
10 SYRINGE (ML) INJECTION EVERY 12 HOURS SCHEDULED
Status: DISCONTINUED | OUTPATIENT
Start: 2023-11-14 | End: 2023-11-16 | Stop reason: HOSPADM

## 2023-11-14 RX ORDER — BISACODYL 5 MG/1
5 TABLET, DELAYED RELEASE ORAL DAILY PRN
Status: DISCONTINUED | OUTPATIENT
Start: 2023-11-14 | End: 2023-11-16 | Stop reason: HOSPADM

## 2023-11-14 RX ORDER — NAPROXEN 500 MG/1
500 TABLET ORAL 2 TIMES DAILY WITH MEALS
Status: DISCONTINUED | OUTPATIENT
Start: 2023-11-14 | End: 2023-11-16 | Stop reason: HOSPADM

## 2023-11-14 RX ORDER — POLYETHYLENE GLYCOL 3350 17 G/17G
17 POWDER, FOR SOLUTION ORAL DAILY PRN
Status: DISCONTINUED | OUTPATIENT
Start: 2023-11-14 | End: 2023-11-16 | Stop reason: HOSPADM

## 2023-11-14 RX ORDER — NITROGLYCERIN 0.4 MG/1
0.4 TABLET SUBLINGUAL
Status: DISCONTINUED | OUTPATIENT
Start: 2023-11-14 | End: 2023-11-16 | Stop reason: HOSPADM

## 2023-11-14 RX ORDER — HYDROCODONE BITARTRATE AND ACETAMINOPHEN 5; 325 MG/1; MG/1
1 TABLET ORAL EVERY 12 HOURS PRN
Status: DISCONTINUED | OUTPATIENT
Start: 2023-11-14 | End: 2023-11-16 | Stop reason: HOSPADM

## 2023-11-14 RX ORDER — CETIRIZINE HYDROCHLORIDE 10 MG/1
10 TABLET ORAL DAILY
Status: DISCONTINUED | OUTPATIENT
Start: 2023-11-14 | End: 2023-11-16 | Stop reason: HOSPADM

## 2023-11-14 RX ORDER — AMOXICILLIN 250 MG
2 CAPSULE ORAL 2 TIMES DAILY
Status: DISCONTINUED | OUTPATIENT
Start: 2023-11-14 | End: 2023-11-16 | Stop reason: HOSPADM

## 2023-11-14 RX ORDER — METHOCARBAMOL 750 MG/1
750 TABLET, FILM COATED ORAL EVERY 8 HOURS SCHEDULED
Status: DISCONTINUED | OUTPATIENT
Start: 2023-11-14 | End: 2023-11-16 | Stop reason: HOSPADM

## 2023-11-14 RX ORDER — ACETAMINOPHEN 325 MG/1
650 TABLET ORAL EVERY 4 HOURS PRN
Status: DISCONTINUED | OUTPATIENT
Start: 2023-11-14 | End: 2023-11-16 | Stop reason: HOSPADM

## 2023-11-14 RX ORDER — SODIUM CHLORIDE 9 MG/ML
40 INJECTION, SOLUTION INTRAVENOUS AS NEEDED
Status: DISCONTINUED | OUTPATIENT
Start: 2023-11-14 | End: 2023-11-16 | Stop reason: HOSPADM

## 2023-11-14 RX ORDER — GABAPENTIN 400 MG/1
400 CAPSULE ORAL 3 TIMES DAILY
Status: DISCONTINUED | OUTPATIENT
Start: 2023-11-14 | End: 2023-11-16 | Stop reason: HOSPADM

## 2023-11-14 RX ORDER — CHLORHEXIDINE GLUCONATE 4 G/100ML
SOLUTION TOPICAL
Qty: 120 ML | Refills: 0 | Status: ON HOLD | OUTPATIENT
Start: 2023-11-14 | End: 2023-11-15

## 2023-11-14 RX ORDER — BISACODYL 10 MG
10 SUPPOSITORY, RECTAL RECTAL DAILY PRN
Status: DISCONTINUED | OUTPATIENT
Start: 2023-11-14 | End: 2023-11-16 | Stop reason: HOSPADM

## 2023-11-14 RX ADMIN — HYDROCODONE BITARTRATE AND ACETAMINOPHEN 1 TABLET: 5; 325 TABLET ORAL at 18:51

## 2023-11-14 RX ADMIN — POTASSIUM CHLORIDE 40 MEQ: 1500 TABLET, EXTENDED RELEASE ORAL at 20:44

## 2023-11-14 RX ADMIN — CETIRIZINE HYDROCHLORIDE 10 MG: 10 TABLET, FILM COATED ORAL at 20:44

## 2023-11-14 RX ADMIN — Medication 10 ML: at 20:44

## 2023-11-14 RX ADMIN — NAPROXEN 500 MG: 500 TABLET ORAL at 20:51

## 2023-11-14 RX ADMIN — SENNOSIDES AND DOCUSATE SODIUM 2 TABLET: 8.6; 5 TABLET ORAL at 20:43

## 2023-11-14 RX ADMIN — METHOCARBAMOL 750 MG: 750 TABLET ORAL at 20:51

## 2023-11-14 RX ADMIN — HYDROMORPHONE HYDROCHLORIDE 0.5 MG: 1 INJECTION, SOLUTION INTRAMUSCULAR; INTRAVENOUS; SUBCUTANEOUS at 20:44

## 2023-11-14 RX ADMIN — DEXAMETHASONE 4 MG: 4 TABLET ORAL at 20:44

## 2023-11-14 RX ADMIN — GABAPENTIN 400 MG: 400 CAPSULE ORAL at 20:44

## 2023-11-14 NOTE — PROGRESS NOTES
Patient: Nelsy Bill  : 1990  Chart #: [id number]    Date of Service: 2023    Chief Complaint   Patient presents with    Back Pain    Leg Pain     RLE    Numbness     RLE       Back Pain  Associated symptoms include leg pain and numbness. Pertinent negatives include no abdominal pain, chest pain, dysuria, fever, headaches, pelvic pain or weakness.   Leg Pain   Associated symptoms include numbness.     This 31 yo female from [place], [place]. She has been having right leg pain since September. She has not improved with PT or multiple medications and injections. Her pain has accelerated to the point she was going to the ED today.  Her PCP reached out and we are seeing her in the office today.  She has been having right-sided low back pain radiating down the right leg since October.  She had x-rays and could not get insurance approval for an MRI until she did 6 weeks of physical therapy despite the evidence of findings on x-ray and examination.  Her pain is progressively gotten worse and to the point today that she could not tolerate the pain anymore.  She has bladder control, she feels that she empties her bladder and she has urgency but no loss of bladder control.  She is having trouble sleeping due to pain, her pain is worse with trying to sit, she has pain with standing and walking.    Chronic Illnesses:  Past Medical History:   Diagnosis Date    Brain concussion     Soccer related    Headache     Chronic migraines that went away with medication    HTN in pregnancy, chronic 2021    Lumbosacral disc disease     Patient denies medical problems     Positive testing for group B Streptococcus 2018    Preeclampsia 2010    Varicella 2000         Past Surgical History:   Procedure Laterality Date     SECTION  ,     x's 2     SECTION WITH TUBAL N/A 2018    Procedure:  SECTION REPEAT;  Surgeon: Swetha Hernandez MD;  Location: [facility] LABOR DELIVERY;  " Service: Obstetrics/Gynecology     SECTION WITH TUBAL N/A 2022    Procedure:  SECTION REPEAT WITH TUBAL;  Surgeon: Swetha Hernandez MD;  Location: [facility];  Service: Obstetrics/Gynecology;  Laterality: N/A;    KNEE ACL RECONSTRUCTION      TUBAL ABDOMINAL LIGATION      WISDOM TOOTH EXTRACTION         Allergies   Allergen Reactions    Mastisol [Wound Dressing Adhesive] Rash     \"a terrible fire burning, blistered rash\"    Other Rash     Steri-strips         Current Outpatient Medications:     diclofenac (VOLTAREN) 75 MG EC tablet, Take 1 tablet by mouth 2 (Two) Times a Day., Disp: 60 tablet, Rfl: 1    gabapentin (NEURONTIN) 400 MG capsule, Take 1 capsule by mouth 3 (Three) Times a Day., Disp: 90 capsule, Rfl: 0    HYDROcodone-acetaminophen (NORCO) 5-325 MG per tablet, Take 1 tablet by mouth Every 12 (Twelve) Hours As Needed for Severe Pain., Disp: 12 tablet, Rfl: 0    loratadine (CLARITIN) 10 MG tablet, Take 1 tablet by mouth Daily., Disp: 30 tablet, Rfl: 0    methocarbamol (ROBAXIN) 750 MG tablet, 1 tablet., Disp: , Rfl:     methylPREDNISolone (MEDROL) 4 MG dose pack, Take as directed on package instructions., Disp: 21 tablet, Rfl: 0    phentermine (Adipex-P) 37.5 MG tablet, Take 1 tablet by mouth Every Morning Before Breakfast., Disp: 30 tablet, Rfl: 2    tiZANidine (Zanaflex) 4 MG tablet, Take 1 tablet by mouth At Night As Needed for Muscle Spasms., Disp: 30 tablet, Rfl: 1    traMADol (ULTRAM) 50 MG tablet, Take 1 tablet by mouth Every 8 (Eight) Hours As Needed for Moderate Pain., Disp: 25 tablet, Rfl: 0    Social History     Socioeconomic History    Marital status:    Tobacco Use    Smoking status: Never     Passive exposure: Never    Smokeless tobacco: Never   Vaping Use    Vaping Use: Never used   Substance and Sexual Activity    Alcohol use: No    Drug use: No    Sexual activity: Yes     Partners: Male     Birth control/protection: Tubal ligation     Comment: Plans bilateral " tubal ligation        Family History   Problem Relation Age of Onset    Prostate cancer Paternal Grandfather     Cancer Paternal Grandmother         Skin    Lung cancer Maternal Grandmother     Cancer Maternal Grandmother         Lung    Prostate cancer Maternal Grandfather     Cancer Maternal Grandfather         Prostate    Miscarriages / Stillbirths Mother         Miscarriage               Social History    Tobacco Use      Smoking status: Never      Smokeless tobacco: Never       Review of Systems   Constitutional:  Negative for activity change, appetite change, chills, diaphoresis, fatigue, fever and unexpected weight change.   HENT:  Negative for congestion, dental problem, drooling, ear discharge, ear pain, facial swelling, hearing loss, mouth sores, nosebleeds, postnasal drip, rhinorrhea, sinus pressure, sinus pain, sneezing, sore throat, tinnitus, trouble swallowing and voice change.    Eyes:  Negative for photophobia, pain, discharge, redness, itching and visual disturbance.   Respiratory:  Negative for apnea, cough, choking, chest tightness, shortness of breath, wheezing and stridor.    Cardiovascular:  Negative for chest pain, palpitations and leg swelling.   Gastrointestinal:  Negative for abdominal distention, abdominal pain, anal bleeding, blood in stool, constipation, diarrhea, nausea, rectal pain and vomiting.   Endocrine: Negative for cold intolerance, heat intolerance, polydipsia, polyphagia and polyuria.   Genitourinary:  Positive for urgency. Negative for decreased urine volume, difficulty urinating, dyspareunia, dysuria, enuresis, flank pain, frequency, genital sores, hematuria, menstrual problem, pelvic pain, vaginal bleeding, vaginal discharge and vaginal pain.   Musculoskeletal:  Positive for arthralgias and back pain. Negative for gait problem, joint swelling, myalgias, neck pain and neck stiffness.   Skin:  Negative for color change, pallor, rash and wound.   Allergic/Immunologic: Negative  "for environmental allergies, food allergies and immunocompromised state.   Neurological:  Positive for numbness. Negative for dizziness, tremors, seizures, syncope, facial asymmetry, speech difficulty, weakness, light-headedness and headaches.   Hematological:  Negative for adenopathy. Does not bruise/bleed easily.   Psychiatric/Behavioral:  Positive for sleep disturbance. Negative for agitation, behavioral problems, confusion, decreased concentration, dysphoric mood, hallucinations, self-injury and suicidal ideas. The patient is not nervous/anxious and is not hyperactive.         Physical examination:  Blood pressure 128/88, temperature 97.7 °F (36.5 °C), temperature source Infrared, height 167.6 cm (66\"), weight 99.4 kg (219 lb 3.2 oz), not currently breastfeeding.  HEENT- normocephalic, atraumatic, sclera clear  Lungs-normal expansion, no wheezing  Heart-regular rate and rhythm  Extremities-positive pulses, no edema    Neurologic Exam  [id number]  A/A/C, speech clear, attention normal, conversant, answers questions appropriately, good historian.  Cranial nerves II through XII are intact.  Motor examination does not reveals mild weakness in the right lower extremity which appears to be pain related.  Straight leg raising is positive.  She has increased pain with motion and motor testing in the right leg  Sensation is intact.  Gait is antalgic favoring the right leg, balance is normal.   No tremors are noted.  Reflexes are absent in the Achilles    Radiographic Imaging:  For my review, x-rays of the lumbar spine from 10/25/2023 revealed narrowing of the disc space and bulging disc at L4-5.  MRI of the lumbar spine from 11/1/2023 reveals disc base narrowing at L4-5, there is a large extruded disc abutting the L5 nerve root on the right and also creating central stenosis.    Medical Decision Making  Diagnoses and all orders for this visit:    1. HNP (herniated nucleus pulposus), lumbar (Primary)    Patient presents " uncontrolled pain in the right leg.  Her MRI from 11/1/2023 reveals a large extruded disc creating compression of the L5 nerve root.  She has failed conservative treatment and she is being admitted to the hospital with plans of surgery on Wednesday.  Dr. Shultz has met with the patient and reviewed her history and performed an independent examination, having failed conservative treatments she will require a lumbar discectomy at L4-5 on the right.  The risks of the surgery including infection, CSF leak or nerve damage was discussed with the patient and we also discussed the possibility of potential future surgery given the 3 level degenerative disc disease in her lumbar spine.  She understands these risks and is excepting.  We will proceed with admission and plans for surgery in the a.m.    Any copied data from previous notes included in the (1) HPI, (2) PE, (3) MDM and/or assessment and plan has been reviewed and is accurate as of this day.    María Chawla, MYESHA    Patient Care Team:  Africa Lowe APRN as PCP - General (Nurse Practitioner)

## 2023-11-14 NOTE — H&P
[facility] Medicine Services  HISTORY AND PHYSICAL    Patient Name: Nelsy Bill  : 1990  MRN: [id number]  Primary Care Physician: Africa Lowe APRN  Date of admission: 2023    Subjective   Subjective     Chief Complaint:  Right-sided low back pain with sciatica to right leg    HPI:  Nelsy Bill is a 32 y.o. female with PMH negative for any medical problems except back pain with radiation down her right leg since September.  She lives in [facility] and was going to go to the ER, but her PCP intervened and called Dr. Shultz's office and because patient could no longer tolerate the pain, she was sent  as a direct admit on 2023  for Dr Shultz.  She is complaining of urgency, numbness in right leg and sleep disturbance d/t pain.  Hospital medicine will admit for further evaluation and treatment.    Personal History     Past Medical History:   Diagnosis Date    Brain concussion     Soccer related    Headache     Chronic migraines that went away with medication    HTN in pregnancy, chronic 2021    Lumbosacral disc disease     Patient denies medical problems     Positive testing for group B Streptococcus 2018    Preeclampsia 2010    Varicella        Past Surgical History:   Procedure Laterality Date     SECTION  ,     x's 2     SECTION WITH TUBAL N/A 2018    Procedure:  SECTION REPEAT;  Surgeon: Swetha Hernandez MD;  Location: [facility] LABOR DELIVERY;  Service: Obstetrics/Gynecology     SECTION WITH TUBAL N/A 2022    Procedure:  SECTION REPEAT WITH TUBAL;  Surgeon: Swetha Hernandez MD;  Location: [facility] OR;  Service: Obstetrics/Gynecology;  Laterality: N/A;    KNEE ACL RECONSTRUCTION      TUBAL ABDOMINAL LIGATION      WISDOM TOOTH EXTRACTION         Family History:  family history includes Cancer in her maternal grandfather, maternal grandmother, and paternal  "grandmother; Lung cancer in her maternal grandmother; Miscarriages / Stillbirths in her mother; Prostate cancer in her maternal grandfather and paternal grandfather.     Social History:  reports that she has never smoked. She has never been exposed to tobacco smoke. She has never used smokeless tobacco. She reports that she does not drink alcohol and does not use drugs.  Social History     Social History Narrative    Not on file       Medications:  HYDROcodone-acetaminophen, chlorhexidine, diclofenac, gabapentin, loratadine, methocarbamol, phentermine, tiZANidine, and traMADol    Allergies   Allergen Reactions    Mastisol [Wound Dressing Adhesive] Rash     \"a terrible fire burning, blistered rash\"    Other Rash     Steri-strips       Objective   Objective     Vital Signs:   Temp:  [97.7 °F (36.5 °C)-98 °F (36.7 °C)] 98 °F (36.7 °C)  Heart Rate:  [106] 106  Resp:  [18] 18  BP: (128-135)/() 135/101    Physical Exam   Constitutional: Alert, WD, WN female standing up eating dinner because she is unable to sit in NAD  ENT: Pink, moist mucous membranes   Respiratory: Nonlabored, symmetrical chest expansion, CTAB, RA  Cardiovascular: RRR, no M/R/G  Musculoskeletal: AGGARWAL; no LE edema bilaterally  Neurologic: Oriented x4, follows all commands, CN II-XII intact, speech clear  Skin: No rashes on exposed skin  Psychiatric: Pleasant and cooperative; normal affect      Result Review:  I have personally reviewed the results from the time of this admission to 11/14/2023 18:45 EST and agree with these findings:  []  Laboratory list / accordion  []  Microbiology  []  Radiology  []  EKG/Telemetry   []  Cardiology/Vascular   []  Pathology  []  Old records  []  Other:  Most notable findings include: Pending    LAB RESULTS:                              Brief Urine Lab Results       None          Microbiology Results (last 10 days)       ** No results found for the last 240 hours. **            No radiology results from the last 24 " hrs        Assessment & Plan   Assessment & Plan       Lumbar herniated disc    Herniated Disc  --Patient of Dr Shultz here for lumbar back surgery  -- Pain control Home medications and IV dilaudid  -- Decadron 4 mg Q6H    No other medical problems  --PRN Bowel meds ordered  --PRN Antiemetic medications  --PRN Antacids  --Electrolyte replacement protocol      DVT prophylaxis: Mechanical    CODE STATUS:    Level Of Support Discussed With: Patient  Code Status (Patient has no pulse and is not breathing): CPR (Attempt to Resuscitate)  Medical Interventions (Patient has pulse or is breathing): Full Support      Expected Discharge 11/18/2023  Expected Discharge Date: 11/18/2023; Expected Discharge Time:       This note has been completed as part of a split-shared workflow.     Electronically signed by JULIANNE Barr, 11/14/23, 5:57 PM EST.      Attending   Admission Attestation       I have performed an independent face-to-face diagnostic evaluation including performing an independent physical examination.  The documented plan of care above was reviewed and developed with the advanced practice clinician (APC).  I have updated the HPI as appropriate.    Brief HPI    Ms. Bill is a 33 yo WF w/ no PMH who presented with two months of worsening lumbar pain. Pt reports that her back started hurting about two months ago with no apparent injury. Pain has progressed despite multiple outpatient medications and has significantly worsened over the last two weeks. She can barely sit, she can only lie down if she is prone and she has had severe pain in her left leg and hip especially. She called her PCP today who got her in to see NSGY emergently this afternoon (she had been scheduled later this week originally).  We were called by NSGY to directly admit for surgical intervention tomorrow.     Attending Physical Exam:  Temp:  [97.7 °F (36.5 °C)-98.1 °F (36.7 °C)] 98.1 °F (36.7 °C)  Heart Rate:  [106] 106  Resp:  [18] 18  BP:  (128-144)/() 144/92    Constitutional: Awake, alert  Eyes: PERRLA, sclerae anicteric, no conjunctival injection  HENT: NCAT, mucous membranes moist  Neck: Supple, no thyromegaly, no lymphadenopathy, trachea midline  Respiratory: Clear to auscultation bilaterally, nonlabored respirations   Cardiovascular: RRR, no murmurs, rubs, or gallops, palpable pedal pulses bilaterally  Gastrointestinal: Positive bowel sounds, soft, nontender, nondistended  Musculoskeletal: No bilateral ankle edema, no clubbing or cyanosis to extremities  Psychiatric: Appropriate affect, cooperative  Neurologic: Oriented x 3, exam limited by patient's back pain, only able to stand up currently, able to move her legs but limited due to pain  Skin: No rashes      Assessment and Plan:    See assessment and plan documented by APC above and updated/edited by me as appropriate.    Carolina Parker MD  11/14/23

## 2023-11-15 ENCOUNTER — APPOINTMENT (OUTPATIENT)
Dept: GENERAL RADIOLOGY | Facility: HOSPITAL | Age: 33
End: 2023-11-15
Payer: COMMERCIAL

## 2023-11-15 ENCOUNTER — ANESTHESIA EVENT (OUTPATIENT)
Dept: PERIOP | Facility: HOSPITAL | Age: 33
End: 2023-11-15
Payer: COMMERCIAL

## 2023-11-15 ENCOUNTER — ANESTHESIA (OUTPATIENT)
Dept: PERIOP | Facility: HOSPITAL | Age: 33
End: 2023-11-15
Payer: COMMERCIAL

## 2023-11-15 LAB
ANION GAP SERPL CALCULATED.3IONS-SCNC: 8 MMOL/L (ref 5–15)
B-HCG UR QL: NEGATIVE
BASOPHILS # BLD AUTO: 0.01 10*3/MM3 (ref 0–0.2)
BASOPHILS NFR BLD AUTO: 0.2 % (ref 0–1.5)
BUN SERPL-MCNC: 10 MG/DL (ref 6–20)
BUN/CREAT SERPL: 16.7 (ref 7–25)
CALCIUM SPEC-SCNC: 9 MG/DL (ref 8.6–10.5)
CHLORIDE SERPL-SCNC: 104 MMOL/L (ref 98–107)
CO2 SERPL-SCNC: 23 MMOL/L (ref 22–29)
CREAT SERPL-MCNC: 0.6 MG/DL (ref 0.57–1)
DEPRECATED RDW RBC AUTO: 40.9 FL (ref 37–54)
EGFRCR SERPLBLD CKD-EPI 2021: 122.5 ML/MIN/1.73
EOSINOPHIL # BLD AUTO: 0 10*3/MM3 (ref 0–0.4)
EOSINOPHIL NFR BLD AUTO: 0 % (ref 0.3–6.2)
ERYTHROCYTE [DISTWIDTH] IN BLOOD BY AUTOMATED COUNT: 12.4 % (ref 12.3–15.4)
EXPIRATION DATE: NORMAL
GLUCOSE SERPL-MCNC: 157 MG/DL (ref 65–99)
HCT VFR BLD AUTO: 43.3 % (ref 34–46.6)
HGB BLD-MCNC: 14.3 G/DL (ref 12–15.9)
IMM GRANULOCYTES # BLD AUTO: 0.02 10*3/MM3 (ref 0–0.05)
IMM GRANULOCYTES NFR BLD AUTO: 0.3 % (ref 0–0.5)
INTERNAL NEGATIVE CONTROL: NEGATIVE
INTERNAL POSITIVE CONTROL: POSITIVE
LYMPHOCYTES # BLD AUTO: 0.55 10*3/MM3 (ref 0.7–3.1)
LYMPHOCYTES NFR BLD AUTO: 9.3 % (ref 19.6–45.3)
Lab: NORMAL
MAGNESIUM SERPL-MCNC: 2.4 MG/DL (ref 1.6–2.6)
MCH RBC QN AUTO: 30.2 PG (ref 26.6–33)
MCHC RBC AUTO-ENTMCNC: 33 G/DL (ref 31.5–35.7)
MCV RBC AUTO: 91.4 FL (ref 79–97)
MONOCYTES # BLD AUTO: 0.06 10*3/MM3 (ref 0.1–0.9)
MONOCYTES NFR BLD AUTO: 1 % (ref 5–12)
NEUTROPHILS NFR BLD AUTO: 5.26 10*3/MM3 (ref 1.7–7)
NEUTROPHILS NFR BLD AUTO: 89.2 % (ref 42.7–76)
NRBC BLD AUTO-RTO: 0 /100 WBC (ref 0–0.2)
PLATELET # BLD AUTO: 331 10*3/MM3 (ref 140–450)
PMV BLD AUTO: 9.5 FL (ref 6–12)
POTASSIUM SERPL-SCNC: 5.4 MMOL/L (ref 3.5–5.2)
POTASSIUM SERPL-SCNC: 5.4 MMOL/L (ref 3.5–5.2)
RBC # BLD AUTO: 4.74 10*6/MM3 (ref 3.77–5.28)
SODIUM SERPL-SCNC: 135 MMOL/L (ref 136–145)
WBC NRBC COR # BLD: 5.9 10*3/MM3 (ref 3.4–10.8)

## 2023-11-15 PROCEDURE — 84132 ASSAY OF SERUM POTASSIUM: CPT | Performed by: NURSE PRACTITIONER

## 2023-11-15 PROCEDURE — 63030 LAMOT DCMPRN NRV RT 1 LMBR: CPT | Performed by: STUDENT IN AN ORGANIZED HEALTH CARE EDUCATION/TRAINING PROGRAM

## 2023-11-15 PROCEDURE — 25810000003 LACTATED RINGERS PER 1000 ML: Performed by: ANESTHESIOLOGY

## 2023-11-15 PROCEDURE — 83735 ASSAY OF MAGNESIUM: CPT | Performed by: NURSE PRACTITIONER

## 2023-11-15 PROCEDURE — 25010000002 FENTANYL CITRATE (PF) 50 MCG/ML SOLUTION: Performed by: NURSE ANESTHETIST, CERTIFIED REGISTERED

## 2023-11-15 PROCEDURE — 25010000002 CEFAZOLIN PER 500 MG: Performed by: STUDENT IN AN ORGANIZED HEALTH CARE EDUCATION/TRAINING PROGRAM

## 2023-11-15 PROCEDURE — 25810000003 LACTATED RINGERS PER 1000 ML: Performed by: STUDENT IN AN ORGANIZED HEALTH CARE EDUCATION/TRAINING PROGRAM

## 2023-11-15 PROCEDURE — 25010000002 HYDROMORPHONE PER 4 MG: Performed by: ANESTHESIOLOGY

## 2023-11-15 PROCEDURE — 25010000002 METHYLPREDNISOLONE PER 40 MG: Performed by: STUDENT IN AN ORGANIZED HEALTH CARE EDUCATION/TRAINING PROGRAM

## 2023-11-15 PROCEDURE — 96375 TX/PRO/DX INJ NEW DRUG ADDON: CPT

## 2023-11-15 PROCEDURE — 25010000002 ONDANSETRON PER 1 MG: Performed by: NURSE PRACTITIONER

## 2023-11-15 PROCEDURE — 96376 TX/PRO/DX INJ SAME DRUG ADON: CPT

## 2023-11-15 PROCEDURE — 63030 LAMOT DCMPRN NRV RT 1 LMBR: CPT

## 2023-11-15 PROCEDURE — G0378 HOSPITAL OBSERVATION PER HR: HCPCS

## 2023-11-15 PROCEDURE — 76000 FLUOROSCOPY <1 HR PHYS/QHP: CPT

## 2023-11-15 PROCEDURE — 81025 URINE PREGNANCY TEST: CPT | Performed by: ANESTHESIOLOGY

## 2023-11-15 PROCEDURE — 25010000002 HYDROMORPHONE PER 4 MG: Performed by: NURSE ANESTHETIST, CERTIFIED REGISTERED

## 2023-11-15 PROCEDURE — 63710000001 DEXAMETHASONE PER 0.25 MG: Performed by: STUDENT IN AN ORGANIZED HEALTH CARE EDUCATION/TRAINING PROGRAM

## 2023-11-15 PROCEDURE — 80048 BASIC METABOLIC PNL TOTAL CA: CPT | Performed by: NURSE PRACTITIONER

## 2023-11-15 PROCEDURE — S0260 H&P FOR SURGERY: HCPCS | Performed by: STUDENT IN AN ORGANIZED HEALTH CARE EDUCATION/TRAINING PROGRAM

## 2023-11-15 PROCEDURE — 25010000002 PROPOFOL 10 MG/ML EMULSION: Performed by: NURSE ANESTHETIST, CERTIFIED REGISTERED

## 2023-11-15 PROCEDURE — 85025 COMPLETE CBC W/AUTO DIFF WBC: CPT | Performed by: NURSE PRACTITIONER

## 2023-11-15 PROCEDURE — 25010000002 ONDANSETRON PER 1 MG: Performed by: NURSE ANESTHETIST, CERTIFIED REGISTERED

## 2023-11-15 PROCEDURE — 25010000002 HYDROMORPHONE PER 4 MG: Performed by: STUDENT IN AN ORGANIZED HEALTH CARE EDUCATION/TRAINING PROGRAM

## 2023-11-15 PROCEDURE — 25010000002 HYDROMORPHONE PER 4 MG: Performed by: NURSE PRACTITIONER

## 2023-11-15 PROCEDURE — 25010000002 ONDANSETRON PER 1 MG: Performed by: STUDENT IN AN ORGANIZED HEALTH CARE EDUCATION/TRAINING PROGRAM

## 2023-11-15 PROCEDURE — 63710000001 DEXAMETHASONE PER 0.25 MG: Performed by: NURSE PRACTITIONER

## 2023-11-15 PROCEDURE — 25010000002 SUGAMMADEX 200 MG/2ML SOLUTION: Performed by: NURSE ANESTHETIST, CERTIFIED REGISTERED

## 2023-11-15 PROCEDURE — 25010000002 DEXAMETHASONE PER 1 MG: Performed by: NURSE ANESTHETIST, CERTIFIED REGISTERED

## 2023-11-15 PROCEDURE — 99232 SBSQ HOSP IP/OBS MODERATE 35: CPT | Performed by: NURSE PRACTITIONER

## 2023-11-15 DEVICE — IMPLANTABLE DEVICE
Type: IMPLANTABLE DEVICE | Site: SPINE LUMBAR | Status: FUNCTIONAL
Brand: SURGIFOAM® ABSORBABLE GELATIN SPONGE, U.S.P.

## 2023-11-15 DEVICE — FLOSEAL WITH RECOTHROM - 10ML.
Type: IMPLANTABLE DEVICE | Site: SPINE LUMBAR | Status: FUNCTIONAL
Brand: FLOSEAL HEMOSTATIC MATRIX

## 2023-11-15 RX ORDER — FENTANYL CITRATE 50 UG/ML
50 INJECTION, SOLUTION INTRAMUSCULAR; INTRAVENOUS
Status: DISCONTINUED | OUTPATIENT
Start: 2023-11-15 | End: 2023-11-15 | Stop reason: HOSPADM

## 2023-11-15 RX ORDER — HYDRALAZINE HYDROCHLORIDE 20 MG/ML
5 INJECTION INTRAMUSCULAR; INTRAVENOUS
Status: DISCONTINUED | OUTPATIENT
Start: 2023-11-15 | End: 2023-11-15 | Stop reason: HOSPADM

## 2023-11-15 RX ORDER — SODIUM CHLORIDE 0.9 % (FLUSH) 0.9 %
3 SYRINGE (ML) INJECTION EVERY 12 HOURS SCHEDULED
Status: DISCONTINUED | OUTPATIENT
Start: 2023-11-15 | End: 2023-11-16 | Stop reason: HOSPADM

## 2023-11-15 RX ORDER — FAMOTIDINE 20 MG/1
20 TABLET, FILM COATED ORAL ONCE
Status: DISCONTINUED | OUTPATIENT
Start: 2023-11-15 | End: 2023-11-15 | Stop reason: HOSPADM

## 2023-11-15 RX ORDER — HYDROCODONE BITARTRATE AND ACETAMINOPHEN 5; 325 MG/1; MG/1
2 TABLET ORAL EVERY 4 HOURS PRN
Status: DISCONTINUED | OUTPATIENT
Start: 2023-11-15 | End: 2023-11-16 | Stop reason: HOSPADM

## 2023-11-15 RX ORDER — SODIUM CHLORIDE 0.9 % (FLUSH) 0.9 %
10 SYRINGE (ML) INJECTION AS NEEDED
Status: DISCONTINUED | OUTPATIENT
Start: 2023-11-15 | End: 2023-11-16 | Stop reason: HOSPADM

## 2023-11-15 RX ORDER — MAGNESIUM HYDROXIDE 1200 MG/15ML
LIQUID ORAL AS NEEDED
Status: DISCONTINUED | OUTPATIENT
Start: 2023-11-15 | End: 2023-11-15 | Stop reason: HOSPADM

## 2023-11-15 RX ORDER — ONDANSETRON 2 MG/ML
INJECTION INTRAMUSCULAR; INTRAVENOUS AS NEEDED
Status: DISCONTINUED | OUTPATIENT
Start: 2023-11-15 | End: 2023-11-15

## 2023-11-15 RX ORDER — DIAZEPAM 5 MG/1
5 TABLET ORAL EVERY 6 HOURS PRN
Status: DISCONTINUED | OUTPATIENT
Start: 2023-11-15 | End: 2023-11-16 | Stop reason: HOSPADM

## 2023-11-15 RX ORDER — ENOXAPARIN SODIUM 100 MG/ML
40 INJECTION SUBCUTANEOUS NIGHTLY
Status: DISCONTINUED | OUTPATIENT
Start: 2023-11-16 | End: 2023-11-16 | Stop reason: HOSPADM

## 2023-11-15 RX ORDER — SODIUM CHLORIDE 0.9 % (FLUSH) 0.9 %
10 SYRINGE (ML) INJECTION EVERY 12 HOURS SCHEDULED
Status: DISCONTINUED | OUTPATIENT
Start: 2023-11-15 | End: 2023-11-15 | Stop reason: HOSPADM

## 2023-11-15 RX ORDER — METHYLPREDNISOLONE ACETATE 40 MG/ML
INJECTION, SUSPENSION INTRA-ARTICULAR; INTRALESIONAL; INTRAMUSCULAR; SOFT TISSUE AS NEEDED
Status: DISCONTINUED | OUTPATIENT
Start: 2023-11-15 | End: 2023-11-15 | Stop reason: HOSPADM

## 2023-11-15 RX ORDER — ROCURONIUM BROMIDE 10 MG/ML
INJECTION, SOLUTION INTRAVENOUS AS NEEDED
Status: DISCONTINUED | OUTPATIENT
Start: 2023-11-15 | End: 2023-11-15 | Stop reason: SURG

## 2023-11-15 RX ORDER — SODIUM CHLORIDE 9 MG/ML
40 INJECTION, SOLUTION INTRAVENOUS AS NEEDED
Status: DISCONTINUED | OUTPATIENT
Start: 2023-11-15 | End: 2023-11-15 | Stop reason: HOSPADM

## 2023-11-15 RX ORDER — SODIUM CHLORIDE 9 MG/ML
40 INJECTION, SOLUTION INTRAVENOUS AS NEEDED
Status: DISCONTINUED | OUTPATIENT
Start: 2023-11-15 | End: 2023-11-16 | Stop reason: HOSPADM

## 2023-11-15 RX ORDER — DOCUSATE SODIUM 100 MG/1
100 CAPSULE, LIQUID FILLED ORAL 2 TIMES DAILY PRN
Status: DISCONTINUED | OUTPATIENT
Start: 2023-11-15 | End: 2023-11-16 | Stop reason: HOSPADM

## 2023-11-15 RX ORDER — MIDAZOLAM HYDROCHLORIDE 1 MG/ML
1 INJECTION INTRAMUSCULAR; INTRAVENOUS
Status: DISCONTINUED | OUTPATIENT
Start: 2023-11-15 | End: 2023-11-15 | Stop reason: HOSPADM

## 2023-11-15 RX ORDER — PROMETHAZINE HYDROCHLORIDE 25 MG/1
25 SUPPOSITORY RECTAL ONCE AS NEEDED
Status: DISCONTINUED | OUTPATIENT
Start: 2023-11-15 | End: 2023-11-15 | Stop reason: HOSPADM

## 2023-11-15 RX ORDER — DEXAMETHASONE SODIUM PHOSPHATE 10 MG/ML
INJECTION INTRAMUSCULAR; INTRAVENOUS AS NEEDED
Status: DISCONTINUED | OUTPATIENT
Start: 2023-11-15 | End: 2023-11-15 | Stop reason: SURG

## 2023-11-15 RX ORDER — HYDROCODONE BITARTRATE AND ACETAMINOPHEN 5; 325 MG/1; MG/1
1 TABLET ORAL ONCE AS NEEDED
Status: DISCONTINUED | OUTPATIENT
Start: 2023-11-15 | End: 2023-11-15 | Stop reason: HOSPADM

## 2023-11-15 RX ORDER — IPRATROPIUM BROMIDE AND ALBUTEROL SULFATE 2.5; .5 MG/3ML; MG/3ML
3 SOLUTION RESPIRATORY (INHALATION) ONCE AS NEEDED
Status: DISCONTINUED | OUTPATIENT
Start: 2023-11-15 | End: 2023-11-15 | Stop reason: HOSPADM

## 2023-11-15 RX ORDER — DROPERIDOL 2.5 MG/ML
0.62 INJECTION, SOLUTION INTRAMUSCULAR; INTRAVENOUS
Status: DISCONTINUED | OUTPATIENT
Start: 2023-11-15 | End: 2023-11-15 | Stop reason: HOSPADM

## 2023-11-15 RX ORDER — LIDOCAINE HYDROCHLORIDE 10 MG/ML
INJECTION, SOLUTION EPIDURAL; INFILTRATION; INTRACAUDAL; PERINEURAL AS NEEDED
Status: DISCONTINUED | OUTPATIENT
Start: 2023-11-15 | End: 2023-11-15 | Stop reason: SURG

## 2023-11-15 RX ORDER — MEPERIDINE HYDROCHLORIDE 25 MG/ML
12.5 INJECTION INTRAMUSCULAR; INTRAVENOUS; SUBCUTANEOUS
Status: DISCONTINUED | OUTPATIENT
Start: 2023-11-15 | End: 2023-11-15 | Stop reason: HOSPADM

## 2023-11-15 RX ORDER — SODIUM CHLORIDE 0.9 % (FLUSH) 0.9 %
3 SYRINGE (ML) INJECTION EVERY 12 HOURS SCHEDULED
Status: DISCONTINUED | OUTPATIENT
Start: 2023-11-15 | End: 2023-11-15 | Stop reason: HOSPADM

## 2023-11-15 RX ORDER — FENTANYL CITRATE 50 UG/ML
INJECTION, SOLUTION INTRAMUSCULAR; INTRAVENOUS AS NEEDED
Status: DISCONTINUED | OUTPATIENT
Start: 2023-11-15 | End: 2023-11-15

## 2023-11-15 RX ORDER — FAMOTIDINE 10 MG/ML
20 INJECTION, SOLUTION INTRAVENOUS ONCE
Status: COMPLETED | OUTPATIENT
Start: 2023-11-15 | End: 2023-11-15

## 2023-11-15 RX ORDER — PROPOFOL 10 MG/ML
VIAL (ML) INTRAVENOUS AS NEEDED
Status: DISCONTINUED | OUTPATIENT
Start: 2023-11-15 | End: 2023-11-15 | Stop reason: SURG

## 2023-11-15 RX ORDER — PROMETHAZINE HYDROCHLORIDE 25 MG/1
25 TABLET ORAL ONCE AS NEEDED
Status: DISCONTINUED | OUTPATIENT
Start: 2023-11-15 | End: 2023-11-15 | Stop reason: HOSPADM

## 2023-11-15 RX ORDER — ONDANSETRON 2 MG/ML
4 INJECTION INTRAMUSCULAR; INTRAVENOUS ONCE AS NEEDED
Status: DISCONTINUED | OUTPATIENT
Start: 2023-11-15 | End: 2023-11-15 | Stop reason: HOSPADM

## 2023-11-15 RX ORDER — NALOXONE HCL 0.4 MG/ML
0.4 VIAL (ML) INJECTION AS NEEDED
Status: DISCONTINUED | OUTPATIENT
Start: 2023-11-15 | End: 2023-11-15 | Stop reason: HOSPADM

## 2023-11-15 RX ORDER — DROPERIDOL 2.5 MG/ML
0.62 INJECTION, SOLUTION INTRAMUSCULAR; INTRAVENOUS ONCE AS NEEDED
Status: DISCONTINUED | OUTPATIENT
Start: 2023-11-15 | End: 2023-11-15 | Stop reason: HOSPADM

## 2023-11-15 RX ORDER — LIDOCAINE HYDROCHLORIDE 10 MG/ML
0.5 INJECTION, SOLUTION EPIDURAL; INFILTRATION; INTRACAUDAL; PERINEURAL ONCE AS NEEDED
Status: DISCONTINUED | OUTPATIENT
Start: 2023-11-15 | End: 2023-11-15 | Stop reason: HOSPADM

## 2023-11-15 RX ORDER — SODIUM CHLORIDE 0.9 % (FLUSH) 0.9 %
10 SYRINGE (ML) INJECTION AS NEEDED
Status: DISCONTINUED | OUTPATIENT
Start: 2023-11-15 | End: 2023-11-15 | Stop reason: HOSPADM

## 2023-11-15 RX ORDER — HYDROMORPHONE HYDROCHLORIDE 1 MG/ML
0.5 INJECTION, SOLUTION INTRAMUSCULAR; INTRAVENOUS; SUBCUTANEOUS
Status: DISCONTINUED | OUTPATIENT
Start: 2023-11-15 | End: 2023-11-15 | Stop reason: HOSPADM

## 2023-11-15 RX ORDER — SODIUM CHLORIDE 0.9 % (FLUSH) 0.9 %
3-10 SYRINGE (ML) INJECTION AS NEEDED
Status: DISCONTINUED | OUTPATIENT
Start: 2023-11-15 | End: 2023-11-15 | Stop reason: HOSPADM

## 2023-11-15 RX ORDER — SODIUM CHLORIDE, SODIUM LACTATE, POTASSIUM CHLORIDE, CALCIUM CHLORIDE 600; 310; 30; 20 MG/100ML; MG/100ML; MG/100ML; MG/100ML
9 INJECTION, SOLUTION INTRAVENOUS CONTINUOUS
Status: DISCONTINUED | OUTPATIENT
Start: 2023-11-15 | End: 2023-11-16 | Stop reason: HOSPADM

## 2023-11-15 RX ORDER — HYDROMORPHONE HYDROCHLORIDE 1 MG/ML
0.5 INJECTION, SOLUTION INTRAMUSCULAR; INTRAVENOUS; SUBCUTANEOUS ONCE
Status: COMPLETED | OUTPATIENT
Start: 2023-11-15 | End: 2023-11-15

## 2023-11-15 RX ORDER — LIDOCAINE HYDROCHLORIDE AND EPINEPHRINE 5; 5 MG/ML; UG/ML
INJECTION, SOLUTION INFILTRATION; PERINEURAL AS NEEDED
Status: DISCONTINUED | OUTPATIENT
Start: 2023-11-15 | End: 2023-11-15 | Stop reason: HOSPADM

## 2023-11-15 RX ORDER — LABETALOL HYDROCHLORIDE 5 MG/ML
5 INJECTION, SOLUTION INTRAVENOUS
Status: DISCONTINUED | OUTPATIENT
Start: 2023-11-15 | End: 2023-11-15 | Stop reason: HOSPADM

## 2023-11-15 RX ADMIN — FENTANYL CITRATE 50 MCG: 50 INJECTION, SOLUTION INTRAMUSCULAR; INTRAVENOUS at 15:27

## 2023-11-15 RX ADMIN — CETIRIZINE HYDROCHLORIDE 10 MG: 10 TABLET, FILM COATED ORAL at 08:44

## 2023-11-15 RX ADMIN — SUGAMMADEX 200 MG: 100 INJECTION, SOLUTION INTRAVENOUS at 14:25

## 2023-11-15 RX ADMIN — GABAPENTIN 400 MG: 400 CAPSULE ORAL at 16:35

## 2023-11-15 RX ADMIN — SODIUM CHLORIDE 2000 MG: 900 INJECTION INTRAVENOUS at 20:01

## 2023-11-15 RX ADMIN — HYDROMORPHONE HYDROCHLORIDE 0.5 MG: 1 INJECTION, SOLUTION INTRAMUSCULAR; INTRAVENOUS; SUBCUTANEOUS at 12:22

## 2023-11-15 RX ADMIN — DEXAMETHASONE 4 MG: 4 TABLET ORAL at 00:15

## 2023-11-15 RX ADMIN — Medication 10 ML: at 08:45

## 2023-11-15 RX ADMIN — POTASSIUM CHLORIDE 40 MEQ: 1500 TABLET, EXTENDED RELEASE ORAL at 00:15

## 2023-11-15 RX ADMIN — HYDROMORPHONE HYDROCHLORIDE 0.5 MG: 1 INJECTION, SOLUTION INTRAMUSCULAR; INTRAVENOUS; SUBCUTANEOUS at 00:15

## 2023-11-15 RX ADMIN — SENNOSIDES AND DOCUSATE SODIUM 2 TABLET: 8.6; 5 TABLET ORAL at 20:13

## 2023-11-15 RX ADMIN — HYDROCODONE BITARTRATE AND ACETAMINOPHEN 1 TABLET: 5; 325 TABLET ORAL at 08:52

## 2023-11-15 RX ADMIN — METHOCARBAMOL 750 MG: 750 TABLET ORAL at 20:13

## 2023-11-15 RX ADMIN — FENTANYL CITRATE 100 MCG: 50 INJECTION, SOLUTION INTRAMUSCULAR; INTRAVENOUS at 13:15

## 2023-11-15 RX ADMIN — ONDANSETRON 4 MG: 2 INJECTION INTRAMUSCULAR; INTRAVENOUS at 00:18

## 2023-11-15 RX ADMIN — SODIUM CHLORIDE 2000 MG: 900 INJECTION INTRAVENOUS at 13:22

## 2023-11-15 RX ADMIN — SODIUM CHLORIDE, POTASSIUM CHLORIDE, SODIUM LACTATE AND CALCIUM CHLORIDE: 600; 310; 30; 20 INJECTION, SOLUTION INTRAVENOUS at 13:12

## 2023-11-15 RX ADMIN — METHOCARBAMOL 750 MG: 750 TABLET ORAL at 05:08

## 2023-11-15 RX ADMIN — NAPROXEN 500 MG: 500 TABLET ORAL at 08:44

## 2023-11-15 RX ADMIN — HYDROMORPHONE HYDROCHLORIDE 0.5 MG: 1 INJECTION, SOLUTION INTRAMUSCULAR; INTRAVENOUS; SUBCUTANEOUS at 15:13

## 2023-11-15 RX ADMIN — HYDROMORPHONE HYDROCHLORIDE 0.5 MG: 1 INJECTION, SOLUTION INTRAMUSCULAR; INTRAVENOUS; SUBCUTANEOUS at 05:08

## 2023-11-15 RX ADMIN — ONDANSETRON 4 MG: 2 INJECTION INTRAMUSCULAR; INTRAVENOUS at 14:25

## 2023-11-15 RX ADMIN — NAPROXEN 500 MG: 500 TABLET ORAL at 18:20

## 2023-11-15 RX ADMIN — HYDROCODONE BITARTRATE AND ACETAMINOPHEN 2 TABLET: 5; 325 TABLET ORAL at 20:21

## 2023-11-15 RX ADMIN — ROCURONIUM BROMIDE 50 MG: 10 SOLUTION INTRAVENOUS at 13:15

## 2023-11-15 RX ADMIN — DEXAMETHASONE SODIUM PHOSPHATE 10 MG: 10 INJECTION INTRAMUSCULAR; INTRAVENOUS at 13:32

## 2023-11-15 RX ADMIN — HYDROCODONE BITARTRATE AND ACETAMINOPHEN 2 TABLET: 5; 325 TABLET ORAL at 16:41

## 2023-11-15 RX ADMIN — FAMOTIDINE 20 MG: 10 INJECTION INTRAVENOUS at 12:09

## 2023-11-15 RX ADMIN — PROPOFOL 250 MG: 10 INJECTION, EMULSION INTRAVENOUS at 13:15

## 2023-11-15 RX ADMIN — DEXAMETHASONE 4 MG: 4 TABLET ORAL at 18:20

## 2023-11-15 RX ADMIN — GABAPENTIN 400 MG: 400 CAPSULE ORAL at 20:13

## 2023-11-15 RX ADMIN — SENNOSIDES AND DOCUSATE SODIUM 2 TABLET: 8.6; 5 TABLET ORAL at 08:44

## 2023-11-15 RX ADMIN — SODIUM CHLORIDE, POTASSIUM CHLORIDE, SODIUM LACTATE AND CALCIUM CHLORIDE 9 ML/HR: 600; 310; 30; 20 INJECTION, SOLUTION INTRAVENOUS at 12:10

## 2023-11-15 RX ADMIN — GABAPENTIN 400 MG: 400 CAPSULE ORAL at 08:44

## 2023-11-15 RX ADMIN — LIDOCAINE HYDROCHLORIDE 50 MG: 10 INJECTION, SOLUTION EPIDURAL; INFILTRATION; INTRACAUDAL; PERINEURAL at 13:15

## 2023-11-15 RX ADMIN — DEXAMETHASONE 4 MG: 4 TABLET ORAL at 05:08

## 2023-11-15 NOTE — ANESTHESIA PROCEDURE NOTES
Airway  Urgency: elective    Date/Time: 11/15/2023 1:37 PM  Airway not difficult    General Information and Staff    Patient location during procedure: OR    Indications and Patient Condition  Indications for airway management: airway protection    Preoxygenated: yes  MILS not maintained throughout  Mask difficulty assessment: 1 - vent by mask    Final Airway Details  Final airway type: endotracheal airway      Successful airway: ETT  Cuffed: yes   Successful intubation technique: direct laryngoscopy  Endotracheal tube insertion site: oral  Blade: Rafael  Blade size: 3  ETT size (mm): 7.0  Cormack-Lehane Classification: grade I - full view of glottis  Placement verified by: chest auscultation and capnometry   Measured from: lips  ETT/EBT  to lips (cm): 21  Number of attempts at approach: 1  Assessment: lips, teeth, and gum same as pre-op and atraumatic intubation    Additional Comments  Negative epigastric sounds, Breath sound equal bilaterally with symmetric chest rise and fall

## 2023-11-15 NOTE — PLAN OF CARE
Goal Outcome Evaluation: Pts pain decently controlled overnight with IV pain medications. Pt denies any additional needs at this time. VS WDL; POC ongoing. Tamiko Spicer RN

## 2023-11-15 NOTE — PROGRESS NOTES
NEUROSURGERY PROGRESS NOTE    Chief Complaint: Right leg pain    Subjective: Stable overnight.  Patient continues to have significant pain in the right lower extremity.    Objective    Vital Signs: Blood pressure 130/75, pulse 71, temperature 97.9 °F (36.6 °C), temperature source Oral, resp. rate 18, SpO2 92%, not currently breastfeeding.    Physical Exam  Awake, alert and oriented x 3  Opens eyes spont  Pupils 3 mm reactive bilaterally  Extraocular muscles intact bilaterally  Face symmetric bilaterally  Tongue midline  Right lower extremity dorsiflexion and knee extension are 4 to 4+ out of 5, secondary to pain.  Otherwise, there is good strength in the bilateral lower extremities    Intake/Output: No intake or output data in the 24 hours ending 11/15/23 1036    Current Medications:   Current Facility-Administered Medications:     acetaminophen (TYLENOL) tablet 650 mg, 650 mg, Oral, Q4H PRN **OR** acetaminophen (TYLENOL) 160 MG/5ML oral solution 650 mg, 650 mg, Oral, Q4H PRN **OR** acetaminophen (TYLENOL) suppository 650 mg, 650 mg, Rectal, Q4H PRN, Amy Ernandez APRN    sennosides-docusate (PERICOLACE) 8.6-50 MG per tablet 2 tablet, 2 tablet, Oral, BID, 2 tablet at 11/15/23 0844 **AND** polyethylene glycol (MIRALAX) packet 17 g, 17 g, Oral, Daily PRN **AND** bisacodyl (DULCOLAX) EC tablet 5 mg, 5 mg, Oral, Daily PRN **AND** bisacodyl (DULCOLAX) suppository 10 mg, 10 mg, Rectal, Daily PRN, Amy Ernandez APRN    calcium carbonate (TUMS) chewable tablet 500 mg (200 mg elemental), 2 tablet, Oral, BID PRN, Amy Ernandez APRKHAI    Calcium Replacement - Follow Nurse / BPA Driven Protocol, , Does not apply, PRN, Amy Ernandez APRN    cetirizine (zyrTEC) tablet 10 mg, 10 mg, Oral, Daily, SerAmy hernandez APRN, 10 mg at 11/15/23 0844    dexAMETHasone (DECADRON) tablet 4 mg, 4 mg, Oral, Q6H, Amy Ernandez APRN, 4 mg at 11/15/23 0508    gabapentin (NEURONTIN) capsule 400 mg, 400 mg, Oral, TID, Amy Ernandez, APRN, 400 mg at  11/15/23 0844    HYDROcodone-acetaminophen (NORCO) 5-325 MG per tablet 1 tablet, 1 tablet, Oral, Q12H PRN, Amy Ernandez APRN, 1 tablet at 11/15/23 0852    HYDROmorphone (DILAUDID) injection 0.5 mg, 0.5 mg, Intravenous, Q2H PRN, 0.5 mg at 11/15/23 0508 **AND** naloxone (NARCAN) injection 0.4 mg, 0.4 mg, Intravenous, Q5 Min PRN, Amy Ernandez, APRN    Magnesium Standard Dose Replacement - Follow Nurse / BPA Driven Protocol, , Does not apply, PRN, SerAmy hernandez, APRN    methocarbamol (ROBAXIN) tablet 750 mg, 750 mg, Oral, Q8H, SerAmy hernandez, APRN, 750 mg at 11/15/23 0508    naproxen (NAPROSYN) tablet 500 mg, 500 mg, Oral, BID With Meals, SerAmy hernandez, APRN, 500 mg at 11/15/23 0844    nitroglycerin (NITROSTAT) SL tablet 0.4 mg, 0.4 mg, Sublingual, Q5 Min PRN, SerAmy hernandez, APRN    ondansetron (ZOFRAN) tablet 4 mg, 4 mg, Oral, Q6H PRN **OR** ondansetron (ZOFRAN) injection 4 mg, 4 mg, Intravenous, Q6H PRN, Amy Ernandez, APRN, 4 mg at 11/15/23 0018    Phosphorus Replacement - Follow Nurse / BPA Driven Protocol, , Does not apply, PRN, Amy Ernandez APRN    [Held by provider] Potassium Replacement - Follow Nurse / BPA Driven Protocol, , Does not apply, PRN, Amy Ernandez, APRN    sodium chloride 0.9 % flush 10 mL, 10 mL, Intravenous, Q12H, Amy Ernandez APRN, 10 mL at 11/15/23 0845    sodium chloride 0.9 % flush 10 mL, 10 mL, Intravenous, PRN, SerAmy hernandez, APRN    sodium chloride 0.9 % infusion 40 mL, 40 mL, Intravenous, PRN, SerAmy hernandez, APRN     Laboratory Results:       Lab 11/15/23  0452 11/14/23 1911   WBC 5.90 7.64   HEMOGLOBIN 14.3 14.7   HEMATOCRIT 43.3 44.0   PLATELETS 331 390   NEUTROS ABS 5.26 4.79   IMMATURE GRANS (ABS) 0.02 0.02   LYMPHS ABS 0.55* 2.11   MONOS ABS 0.06* 0.45   EOS ABS 0.00 0.23   MCV 91.4 90.3         Lab 11/15/23  0452 11/14/23 1911   SODIUM 135* 140   POTASSIUM 5.4*  5.4* 3.5   CHLORIDE 104 102   CO2 23.0 28.0   ANION GAP 8.0 10.0   BUN 10 13   CREATININE 0.60 0.86   EGFR 122.5  92.2   GLUCOSE 157* 142*   CALCIUM 9.0 9.5   MAGNESIUM 2.4 2.2   HEMOGLOBIN A1C  --  5.20         Lab 11/14/23  1911   TOTAL PROTEIN 7.7   ALBUMIN 4.7   GLOBULIN 3.0   ALT (SGPT) 31   AST (SGOT) 23   BILIRUBIN 0.3   ALK PHOS 103                     Brief Urine Lab Results       None          Microbiology Results (last 10 days)       ** No results found for the last 240 hours. **             Diagnostic Imaging: I reviewed and independently interpreted the new imaging.    Assessment/Plan:  This is a 32-year-old female with medically refractory severe right leg pain due to a large disc herniation at L4-5.  Plan for discectomy today.  I again reviewed the risks, benefits and alternatives with the patient regarding a microlumbar discectomy.  These include but are not limited to bleeding, infection, CSF leak and nerve injury.  The patient understood these and has agreed to proceed with the surgery.    Any copied data from previous notes included in the (1) History of Present Illness, (2) Physical Examination and (3) Medical Decision Making and/or Assessment and Plan has been reviewed and is accurate as of 11/15/23      Medhat Shultz MD  11/15/23  10:36 EST

## 2023-11-15 NOTE — OP NOTE
Date of operation: November 15, 2023    Attending surgeon: Dr. Medhat Shultz MD  Surgical Assistance: JEN Block    Preoperative diagnosis: Disc herniation at L4-5 causing compression of the nerve root.    Postoperative diagnosis: The same    Operations performed: 1.  Microlumbar discectomy through a MetRx tube system at L4-5 on the right  2. Use of intraoperative microscope with microdissection techniques    Findings: Several large pieces of disc fragment removed.  Circumferential decompression of the nerve root achieved.    Specimens: None    Indications: Severe right leg pain    Procedure in detail: The patient was brought back to the operating room and placed under general anesthesia.  They were endotracheally intubated.  They were positioned prone on a Cale frame with all pressure points well padded.  At this time, the patient's lumbar back was then prepped and draped in a sterile fashion.  A timeout was performed and antibiotics were given.  At this time, we used fluoroscopy to localize an incision marking 1.5 inches off the midline to the patient's right at the L4-5 disc base.  Local anesthetic was injected into the incision marking.  At this time, using fluoroscopy, the MetRx tube system was used to dilate the tubes and docked them at the L4-5 disc space.  At this time, we brought the microscope into the field.  Under direct microscopic vision and with microdissection techniques we removed the remaining muscle off of the lamina.  We then used a combination of drills and Kerrisons to remove the lamina and the medial one third portion of the facet joint.  In doing this, we could visualize the ligamentum flavum.  At this time, using curettes and Kerrisons we removed the ligament until we could visualize the thecal sac as well as the shoulder of the exiting nerve root.  We continued our decompression out the foramen of the exiting nerve root.  At this time, we retracted the nerve root and thecal sac  medially to visualize the disc space.  We used an 11 blade knife to incise the disc space.  We then used a combination of curettes and pituitaries to remove several large pieces of disc.  Once this was done, we palpated circumferentially around the nerve root and thecal sac to ensure that there was complete decompression, which there was.  At this time we copiously irrigated the field and achieved complete hemostasis with Gelfoam powder. We then injected 2 mL of Depo-Medrol around the nerve root. At this time, we slowly removed the MetRx tube using bipolar cautery of the muscle as we removed it.  We then turned our attention to the closure where the fascia was closed with an interrupted 2-0 Vicryl stitch.  The dermis was closed with interrupted inverted 3-0 Vicryl stitches and the skin with a Monocryl stitch and Dermabond.    Leighann Castillo was present for all portions of the surgery and assisted with patient positioning, opening, retraction, suturing, and closing.  At the end of the case all counts were correct x2 and there were no complications noted.

## 2023-11-15 NOTE — CASE MANAGEMENT/SOCIAL WORK
Discharge Planning Assessment  [facility]     Patient Name: Nelsy Bill  MRN: [id number]  Today's Date: 11/15/2023    Admit Date: 11/14/2023    Plan: Home with spouse's assistance   Discharge Needs Assessment       Row Name 11/15/23 0908       Living Environment    People in Home child(bree), dependent    Current Living Arrangements home    Potentially Unsafe Housing Conditions none    In the past 12 months has the electric, gas, oil, or water company threatened to shut off services in your home? No    Primary Care Provided by self    Provides Primary Care For child(bree)    Family Caregiver if Needed spouse    Family Caregiver Names Octaviano Bill Spouse [phone]    Quality of Family Relationships helpful;involved;supportive    Able to Return to Prior Arrangements yes       Resource/Environmental Concerns    Resource/Environmental Concerns none    Transportation Concerns none       Food Insecurity    Within the past 12 months, you worried that your food would run out before you got the money to buy more. Never true    Within the past 12 months, the food you bought just didn't last and you didn't have money to get more. Never true       Transition Planning    Patient/Family Anticipates Transition to home    Patient/Family Anticipated Services at Transition none    Transportation Anticipated family or friend will provide       Discharge Needs Assessment    Readmission Within the Last 30 Days no previous admission in last 30 days    Equipment Currently Used at Home none    Concerns to be Addressed denies needs/concerns at this time    Anticipated Changes Related to Illness none                   Discharge Plan       Row Name 11/15/23 0909       Plan    Plan Home with spouse's assistance    Patient/Family in Agreement with Plan yes    Plan Comments CM spoke with patient at bedside regarding DC planning. Patient resides in [place] with her spouse and dep children. Patient is independent with ADL's, denies  any DME. Patient denies any current home health or outpatient services. Patient has medical insurance, prescription coverage and is able to afford/obtain medications without difficulty. Patient has no advanced directives. Patient denies any discharge planning needs. Spouse works from home and will be home with her at [place]. To OR today. CM will continue to follow    Final Discharge Disposition Code 01 - home or self-care                  Continued Care and Services - Admitted Since 11/14/2023    Coordination has not been started for this encounter.       Expected Discharge Date and Time       Expected Discharge Date Expected Discharge Time    Nov 16, 2023            Demographic Summary       Row Name 11/15/23 0906       General Information    Arrived From home    Referral Source physician    Reason for Consult discharge planning    Preferred Language English       Contact Information    Contact Information Comments Octaviano Bill Spouse [phone]                   Functional Status       Row Name 11/15/23 0906       Functional Status    Usual Activity Tolerance good    Current Activity Tolerance moderate       Physical Activity    On average, how many days per week do you engage in moderate to strenuous exercise (like a brisk walk)? 0 days    On average, how many minutes do you engage in exercise at this level? 0 min    Number of minutes of exercise per week 0       Assessment of Health Literacy    How often do you have someone help you read hospital materials? Never    How often do you have problems learning about your medical condition because of difficulty understanding written information? Never    How often do you have a problem understanding what is told to you about your medical condition? Never    How confident are you filling out medical forms by yourself? Quite a bit    Health Literacy Good       Functional Status, IADL    Medications independent    Meal Preparation independent    Housekeeping independent     Laundry independent    Shopping independent       Mental Status    General Appearance WDL WDL       Employment/    Employment Status employed full-time                   Psychosocial    No documentation.                  Abuse/Neglect    No documentation.                  Legal    No documentation.                  Substance Abuse    No documentation.                  Patient Forms    No documentation.                     Rebecca Londono RN

## 2023-11-15 NOTE — BRIEF OP NOTE
LUMBAR DISCECTOMY  Progress Note    Nelsy Bill  11/15/2023    Pre-op Diagnosis:   HNP (herniated nucleus pulposus), lumbar [M51.26]       Post-Op Diagnosis Codes:     * HNP (herniated nucleus pulposus), lumbar [M51.26]    Procedure/CPT® Codes:        Procedure(s):  LUMBAR DISCECTOMY              Surgeon(s):  Medhat Shultz MD    Anesthesia: General    Staff:   Circulator: Liza Fish RN  Physician Assistant: Leighann Castillo PA-C  Scrub Person: Jose Parker Dragica  Nursing Assistant: Kourtney Waddell         Estimated Blood Loss: minimal    Urine Voided: * No values recorded between 11/15/2023  1:12 PM and 11/15/2023  2:22 PM *    Specimens:                None          Drains: * No LDAs found *    Findings: Several large pieces of disc fragment removed.  Circumferential decompression of the nerve root achieved.        Complications: None apparent          Medhat Shultz MD     Date: 11/15/2023  Time: 14:24 EST

## 2023-11-15 NOTE — ANESTHESIA PREPROCEDURE EVALUATION
Anesthesia Evaluation     Patient summary reviewed and Nursing notes reviewed   no history of anesthetic complications:   NPO Solid Status: > 8 hours  NPO Liquid Status: > 8 hours           Airway   Mallampati: II  TM distance: >3 FB  Neck ROM: full  No difficulty expected  Dental      Pulmonary - negative pulmonary ROS and normal exam   Cardiovascular - normal exam    (+) hypertension      Neuro/Psych  (+) headaches  GI/Hepatic/Renal/Endo    (+) morbid obesity    Musculoskeletal     Abdominal    Substance History      OB/GYN          Other                    Anesthesia Plan    ASA 3     general     intravenous induction     Anesthetic plan, risks, benefits, and alternatives have been provided, discussed and informed consent has been obtained with: patient.    Plan discussed with CRNA.    CODE STATUS:    Level Of Support Discussed With: Patient  Code Status (Patient has no pulse and is not breathing): CPR (Attempt to Resuscitate)  Medical Interventions (Patient has pulse or is breathing): Full Support

## 2023-11-15 NOTE — ANESTHESIA POSTPROCEDURE EVALUATION
Patient: Nelsy Bill    Procedure Summary       Date: 11/15/23 Room / Location:  [facility]  [facility]    Anesthesia Start: 1312 Anesthesia Stop: 1450    Procedure: LUMBAR DISCECTOMY L4-5 (Right: Spine Lumbar) Diagnosis:       HNP (herniated nucleus pulposus), lumbar      (HNP (herniated nucleus pulposus), lumbar [M51.26])    Surgeons: Medhat Shultz MD Provider: Michael Guerrero MD    Anesthesia Type: general ASA Status: 3            Anesthesia Type: general    Vitals  Vitals Value Taken Time   /61 11/15/23 1447   Temp     Pulse 91 11/15/23 1450   Resp     SpO2 97 % 11/15/23 1450   Vitals shown include unfiled device data.        Post Anesthesia Care and Evaluation    Patient location during evaluation: PACU  Patient participation: complete - patient participated  Level of consciousness: awake and alert  Pain management: adequate    Airway patency: patent  Anesthetic complications: No anesthetic complications  PONV Status: none  Cardiovascular status: hemodynamically stable and acceptable  Respiratory status: nonlabored ventilation, acceptable and nasal cannula  Hydration status: acceptable

## 2023-11-15 NOTE — PROGRESS NOTES
[facility] Medicine Services  PROGRESS NOTE    Patient Name: Nelsy Bill  : 1990  MRN: [id number]    Date of Admission: 2023  Primary Care Physician: Africa Lowe APRN    Subjective   Subjective   CC:  Low Back pain    HPI:  Patient sound asleep on her stomach with stable vital signs.  Patient states she has not slept in days due to her back pain, I will let her sleep.  She is scheduled for surgery at 12 noon today.      Objective   Objective     Vital Signs:   Temp:  [97.7 °F (36.5 °C)-98.2 °F (36.8 °C)] 97.9 °F (36.6 °C)  Heart Rate:  [] 71  Resp:  [18] 18  BP: (128-144)/() 130/75     Physical Exam:  Constitutional: Sleeping WD, WN female sound asleep because she is unable to sit in NAD  ENT: Pink, moist mucous membranes   Respiratory: Nonlabored, symmetrical chest expansion, CTAB, RA  Cardiovascular: RRR, no M/R/G  Musculoskeletal: AGGARWAL; no LE edema bilaterally  Neurologic: Asleep  Skin: No rashes on exposed skin  Psychiatric: Pleasant and cooperative; normal affect    Results Reviewed:  LAB RESULTS:      Lab 11/15/23  0452 23   WBC 5.90 7.64   HEMOGLOBIN 14.3 14.7   HEMATOCRIT 43.3 44.0   PLATELETS 331 390   NEUTROS ABS 5.26 4.79   IMMATURE GRANS (ABS) 0.02 0.02   LYMPHS ABS 0.55* 2.11   MONOS ABS 0.06* 0.45   EOS ABS 0.00 0.23   MCV 91.4 90.3         Lab 11/15/23  0452 23   SODIUM 135* 140   POTASSIUM 5.4*  5.4* 3.5   CHLORIDE 104 102   CO2 23.0 28.0   ANION GAP 8.0 10.0   BUN 10 13   CREATININE 0.60 0.86   EGFR 122.5 92.2   GLUCOSE 157* 142*   CALCIUM 9.0 9.5   MAGNESIUM 2.4 2.2   HEMOGLOBIN A1C  --  5.20         Lab 23   TOTAL PROTEIN 7.7   ALBUMIN 4.7   GLOBULIN 3.0   ALT (SGPT) 31   AST (SGOT) 23   BILIRUBIN 0.3   ALK PHOS 103                     Brief Urine Lab Results       None            Microbiology Results Abnormal       None            No radiology results from the last 24 hrs        Current  medications:  Scheduled Meds:cetirizine, 10 mg, Oral, Daily  dexAMETHasone, 4 mg, Oral, Q6H  gabapentin, 400 mg, Oral, TID  methocarbamol, 750 mg, Oral, Q8H  naproxen, 500 mg, Oral, BID With Meals  senna-docusate sodium, 2 tablet, Oral, BID  sodium chloride, 10 mL, Intravenous, Q12H      Continuous Infusions:   PRN Meds:.  acetaminophen **OR** acetaminophen **OR** acetaminophen    senna-docusate sodium **AND** polyethylene glycol **AND** bisacodyl **AND** bisacodyl    calcium carbonate    Calcium Replacement - Follow Nurse / BPA Driven Protocol    HYDROcodone-acetaminophen    HYDROmorphone **AND** naloxone    Magnesium Standard Dose Replacement - Follow Nurse / BPA Driven Protocol    nitroglycerin    ondansetron **OR** ondansetron    Phosphorus Replacement - Follow Nurse / BPA Driven Protocol    [Held by provider] Potassium Replacement - Follow Nurse / BPA Driven Protocol    sodium chloride    sodium chloride    Assessment & Plan   Assessment & Plan     Active Hospital Problems    Diagnosis  POA    **Lumbar herniated disc [M51.26]  Yes    HNP (herniated nucleus pulposus), lumbar [M51.26]  Unknown      Resolved Hospital Problems   No resolved problems to display.        Brief Hospital Course to date:  Nelsy Bill is a 32 y.o. female with PMH negative for any medical problems except back pain with radiation down her right leg since September.  She lives in [facility] and was going to go to the ER, but her PCP intervened and called Dr. Shultz's office and because patient could no longer tolerate the pain, she was sent  as a direct admit on 11/14/2023  for Dr Shultz.  She is complaining of urgency, numbness in right leg and sleep disturbance d/t pain.  Hospital medicine will admit for further evaluation and treatment.     Herniated Disc  --Patient of Dr Shultz here for lumbar back surgery  -- Pain control Home medications and IV dilaudid  -- Decadron 4 mg Q6H  --Patient scheduled for surgery at  12 noon today is probably back, --AM labs     No other medical problems  --PRN Bowel meds ordered  --PRN Antiemetic medications  --PRN Antacids  --Electrolyte replacement protocol    Expected Discharge Location and Transportation:   Expected Discharge   Expected Discharge Date: 11/16/2023; Expected Discharge Time:      DVT prophylaxis:  Mechanical DVT prophylaxis orders are present.          CODE STATUS:   Code Status and Medical Interventions:   Ordered at: 11/14/23 1573     Level Of Support Discussed With:    Patient     Code Status (Patient has no pulse and is not breathing):    CPR (Attempt to Resuscitate)     Medical Interventions (Patient has pulse or is breathing):    Full Support       Amy Ernandez, APRN  11/15/23

## 2023-11-16 ENCOUNTER — APPOINTMENT (OUTPATIENT)
Dept: CARDIOLOGY | Facility: HOSPITAL | Age: 33
End: 2023-11-16
Payer: COMMERCIAL

## 2023-11-16 ENCOUNTER — READMISSION MANAGEMENT (OUTPATIENT)
Dept: CALL CENTER | Facility: HOSPITAL | Age: 33
End: 2023-11-16
Payer: COMMERCIAL

## 2023-11-16 VITALS
RESPIRATION RATE: 18 BRPM | BODY MASS INDEX: 35.2 KG/M2 | WEIGHT: 219 LBS | HEIGHT: 66 IN | DIASTOLIC BLOOD PRESSURE: 72 MMHG | OXYGEN SATURATION: 98 % | SYSTOLIC BLOOD PRESSURE: 132 MMHG | TEMPERATURE: 98.2 F | HEART RATE: 87 BPM

## 2023-11-16 PROBLEM — M79.662 PAIN OF LEFT CALF: Status: ACTIVE | Noted: 2023-11-16

## 2023-11-16 LAB
ANION GAP SERPL CALCULATED.3IONS-SCNC: 9 MMOL/L (ref 5–15)
BH CV LOWER VASCULAR LEFT COMMON FEMORAL AUGMENT: NORMAL
BH CV LOWER VASCULAR LEFT COMMON FEMORAL COMPRESS: NORMAL
BH CV LOWER VASCULAR LEFT COMMON FEMORAL PHASIC: NORMAL
BH CV LOWER VASCULAR LEFT COMMON FEMORAL SPONT: NORMAL
BH CV LOWER VASCULAR LEFT DISTAL FEMORAL AUGMENT: NORMAL
BH CV LOWER VASCULAR LEFT DISTAL FEMORAL COMPRESS: NORMAL
BH CV LOWER VASCULAR LEFT DISTAL FEMORAL PHASIC: NORMAL
BH CV LOWER VASCULAR LEFT DISTAL FEMORAL SPONT: NORMAL
BH CV LOWER VASCULAR LEFT GASTRONEMIUS COMPRESS: NORMAL
BH CV LOWER VASCULAR LEFT GREATER SAPH AK COMPRESS: NORMAL
BH CV LOWER VASCULAR LEFT GREATER SAPH BK COMPRESS: NORMAL
BH CV LOWER VASCULAR LEFT LESSER SAPH COMPRESS: NORMAL
BH CV LOWER VASCULAR LEFT MID FEMORAL AUGMENT: NORMAL
BH CV LOWER VASCULAR LEFT MID FEMORAL COMPRESS: NORMAL
BH CV LOWER VASCULAR LEFT MID FEMORAL PHASIC: NORMAL
BH CV LOWER VASCULAR LEFT MID FEMORAL SPONT: NORMAL
BH CV LOWER VASCULAR LEFT PERONEAL AUGMENT: NORMAL
BH CV LOWER VASCULAR LEFT PERONEAL COMPRESS: NORMAL
BH CV LOWER VASCULAR LEFT POPLITEAL AUGMENT: NORMAL
BH CV LOWER VASCULAR LEFT POPLITEAL COMPRESS: NORMAL
BH CV LOWER VASCULAR LEFT POPLITEAL PHASIC: NORMAL
BH CV LOWER VASCULAR LEFT POPLITEAL SPONT: NORMAL
BH CV LOWER VASCULAR LEFT POSTERIOR TIBIAL AUGMENT: NORMAL
BH CV LOWER VASCULAR LEFT POSTERIOR TIBIAL COMPRESS: NORMAL
BH CV LOWER VASCULAR LEFT PROFUNDA FEMORAL AUGMENT: NORMAL
BH CV LOWER VASCULAR LEFT PROFUNDA FEMORAL PHASIC: NORMAL
BH CV LOWER VASCULAR LEFT PROFUNDA FEMORAL SPONT: NORMAL
BH CV LOWER VASCULAR LEFT PROXIMAL FEMORAL AUGMENT: NORMAL
BH CV LOWER VASCULAR LEFT PROXIMAL FEMORAL COMPRESS: NORMAL
BH CV LOWER VASCULAR LEFT PROXIMAL FEMORAL PHASIC: NORMAL
BH CV LOWER VASCULAR LEFT PROXIMAL FEMORAL SPONT: NORMAL
BH CV LOWER VASCULAR LEFT SAPHENOFEMORAL JUNCTION AUGMENT: NORMAL
BH CV LOWER VASCULAR LEFT SAPHENOFEMORAL JUNCTION COMPRESS: NORMAL
BH CV LOWER VASCULAR LEFT SAPHENOFEMORAL JUNCTION PHASIC: NORMAL
BH CV LOWER VASCULAR LEFT SAPHENOFEMORAL JUNCTION SPONT: NORMAL
BH CV LOWER VASCULAR RIGHT COMMON FEMORAL AUGMENT: NORMAL
BH CV LOWER VASCULAR RIGHT COMMON FEMORAL COMPRESS: NORMAL
BH CV LOWER VASCULAR RIGHT COMMON FEMORAL PHASIC: NORMAL
BH CV LOWER VASCULAR RIGHT COMMON FEMORAL SPONT: NORMAL
BH CV LOWER VASCULAR RIGHT SAPHENOFEMORAL JUNCTION COMPRESS: NORMAL
BUN SERPL-MCNC: 9 MG/DL (ref 6–20)
BUN/CREAT SERPL: 14.8 (ref 7–25)
CALCIUM SPEC-SCNC: 8.2 MG/DL (ref 8.6–10.5)
CHLORIDE SERPL-SCNC: 106 MMOL/L (ref 98–107)
CO2 SERPL-SCNC: 26 MMOL/L (ref 22–29)
CREAT SERPL-MCNC: 0.61 MG/DL (ref 0.57–1)
DEPRECATED RDW RBC AUTO: 43.9 FL (ref 37–54)
EGFRCR SERPLBLD CKD-EPI 2021: 122 ML/MIN/1.73
ERYTHROCYTE [DISTWIDTH] IN BLOOD BY AUTOMATED COUNT: 13.1 % (ref 12.3–15.4)
GLUCOSE SERPL-MCNC: 199 MG/DL (ref 65–99)
HCT VFR BLD AUTO: 37.4 % (ref 34–46.6)
HGB BLD-MCNC: 12.1 G/DL (ref 12–15.9)
MCH RBC QN AUTO: 30.2 PG (ref 26.6–33)
MCHC RBC AUTO-ENTMCNC: 32.4 G/DL (ref 31.5–35.7)
MCV RBC AUTO: 93.3 FL (ref 79–97)
PLATELET # BLD AUTO: 313 10*3/MM3 (ref 140–450)
PMV BLD AUTO: 9.2 FL (ref 6–12)
POTASSIUM SERPL-SCNC: 4.4 MMOL/L (ref 3.5–5.2)
RBC # BLD AUTO: 4.01 10*6/MM3 (ref 3.77–5.28)
SODIUM SERPL-SCNC: 141 MMOL/L (ref 136–145)
WBC NRBC COR # BLD: 15.69 10*3/MM3 (ref 3.4–10.8)

## 2023-11-16 PROCEDURE — 93971 EXTREMITY STUDY: CPT | Performed by: INTERNAL MEDICINE

## 2023-11-16 PROCEDURE — G0378 HOSPITAL OBSERVATION PER HR: HCPCS

## 2023-11-16 PROCEDURE — 63710000001 DEXAMETHASONE PER 0.25 MG: Performed by: STUDENT IN AN ORGANIZED HEALTH CARE EDUCATION/TRAINING PROGRAM

## 2023-11-16 PROCEDURE — 85027 COMPLETE CBC AUTOMATED: CPT | Performed by: NURSE PRACTITIONER

## 2023-11-16 PROCEDURE — 97161 PT EVAL LOW COMPLEX 20 MIN: CPT

## 2023-11-16 PROCEDURE — 63710000001 ONDANSETRON PER 8 MG: Performed by: STUDENT IN AN ORGANIZED HEALTH CARE EDUCATION/TRAINING PROGRAM

## 2023-11-16 PROCEDURE — 99238 HOSP IP/OBS DSCHRG MGMT 30/<: CPT | Performed by: NURSE PRACTITIONER

## 2023-11-16 PROCEDURE — 25010000002 CEFAZOLIN PER 500 MG: Performed by: STUDENT IN AN ORGANIZED HEALTH CARE EDUCATION/TRAINING PROGRAM

## 2023-11-16 PROCEDURE — 93971 EXTREMITY STUDY: CPT

## 2023-11-16 PROCEDURE — 80048 BASIC METABOLIC PNL TOTAL CA: CPT | Performed by: NURSE PRACTITIONER

## 2023-11-16 RX ORDER — AMOXICILLIN 250 MG
1 CAPSULE ORAL DAILY
Qty: 30 TABLET | Refills: 0 | Status: SHIPPED | OUTPATIENT
Start: 2023-11-16

## 2023-11-16 RX ORDER — HYDROCODONE BITARTRATE AND ACETAMINOPHEN 5; 325 MG/1; MG/1
1 TABLET ORAL EVERY 4 HOURS PRN
Qty: 30 TABLET | Refills: 0 | Status: SHIPPED | OUTPATIENT
Start: 2023-11-16 | End: 2023-11-26 | Stop reason: SDUPTHER

## 2023-11-16 RX ORDER — DIAZEPAM 5 MG/1
5 TABLET ORAL EVERY 8 HOURS PRN
Qty: 20 TABLET | Refills: 0 | Status: SHIPPED | OUTPATIENT
Start: 2023-11-16 | End: 2023-11-26 | Stop reason: SDUPTHER

## 2023-11-16 RX ADMIN — NAPROXEN 500 MG: 500 TABLET ORAL at 08:22

## 2023-11-16 RX ADMIN — GABAPENTIN 400 MG: 400 CAPSULE ORAL at 08:22

## 2023-11-16 RX ADMIN — ACETAMINOPHEN 650 MG: 325 TABLET ORAL at 05:57

## 2023-11-16 RX ADMIN — ONDANSETRON HYDROCHLORIDE 4 MG: 4 TABLET, FILM COATED ORAL at 06:13

## 2023-11-16 RX ADMIN — SODIUM CHLORIDE 2000 MG: 900 INJECTION INTRAVENOUS at 04:13

## 2023-11-16 RX ADMIN — DIAZEPAM 5 MG: 5 TABLET ORAL at 09:29

## 2023-11-16 RX ADMIN — Medication 3 ML: at 08:26

## 2023-11-16 RX ADMIN — METHOCARBAMOL 750 MG: 750 TABLET ORAL at 16:02

## 2023-11-16 RX ADMIN — DEXAMETHASONE 4 MG: 4 TABLET ORAL at 00:49

## 2023-11-16 RX ADMIN — METHOCARBAMOL 750 MG: 750 TABLET ORAL at 05:52

## 2023-11-16 RX ADMIN — DEXAMETHASONE 4 MG: 4 TABLET ORAL at 05:52

## 2023-11-16 RX ADMIN — SENNOSIDES AND DOCUSATE SODIUM 2 TABLET: 8.6; 5 TABLET ORAL at 08:22

## 2023-11-16 RX ADMIN — Medication 10 ML: at 08:26

## 2023-11-16 RX ADMIN — CETIRIZINE HYDROCHLORIDE 10 MG: 10 TABLET, FILM COATED ORAL at 08:22

## 2023-11-16 RX ADMIN — GABAPENTIN 400 MG: 400 CAPSULE ORAL at 16:02

## 2023-11-16 RX ADMIN — DEXAMETHASONE 4 MG: 4 TABLET ORAL at 11:50

## 2023-11-16 NOTE — PLAN OF CARE
Goal Outcome Evaluation:      Pt rested well, pain controlled with oral PO pain meds. Walked the whole loop of the hallway, so s/s noted afterwards, peeing well.

## 2023-11-16 NOTE — DISCHARGE SUMMARY
[facility] Medicine Services  DISCHARGE SUMMARY    Patient Name: Nelsy Bill  : 1990  MRN: [id number]    Date of Admission: 2023  4:34 PM  Date of Discharge:  2023  Primary Care Physician: Africa Lowe APRN    Consults       Date and Time Order Name Status Description    2023  5:56 PM Inpatient Neurosurgery Consult              Hospital Course     Active Hospital Problems    Diagnosis  POA    **Lumbar herniated disc [M51.26]  Yes    Pain of left calf [M79.662]  Unknown    HNP (herniated nucleus pulposus), lumbar [M51.26]  Yes      Resolved Hospital Problems   No resolved problems to display.          Hospital Course:  Nelsy Bill is a 32 y.o. female with PMH negative for any medical problems except back pain with radiation down her right leg since September.  She lives in [facility] and was going to go to the ER, but her PCP intervened and called Dr. Shultz's office and because patient could no longer tolerate the pain, she was sent as a direct admit on 2023  for Dr Shultz.  She is complaining of urgency, numbness in right leg and sleep disturbance d/t pain.  Hospital medicine will admit for further evaluation and treatment.      Herniated Disc  --S/p microlumbar discectomy per Dr Shultz on 11/15/2023 surgery  -- Pain control Home medications and   -- IV dilaudid dc'd  -- Decadron 4 mg Q6H  --Elevated glucose most likely d/t steroids    Hyperkalemia, mild  --K+ 5.4  --AM labs; K 4.4    Leukocytosis  --WBC 15.69  --Most likely reactive from surgery     Left Calf Pain  --LLE venous doppler-normal     No other medical problems  --PRN Bowel meds ordered  --PRN Antiemetic medications  --PRN Antacids  --Electrolyte replacement protocol    Patient is ready for discharge home today and family will transport.  Discharge follow-up recommendations and appointments are listed below.    Discharge Follow Up Recommendations for  outpatient labs/diagnostics:  -- Follow-up with your family doctor in 1 week  --Follow-up with Dr. Shultz in 2 weeks  --Take Valium as prescribed for muscle relaxer  --Take Norco as prescribed for pain    Day of Discharge     HPI:   Patient sitting up in bed and states that her back pain is much better but she still having so.  All the numbness has resolved.  She is hoping for discharge home today.     Vital Signs:   Temp:  [97.8 °F (36.6 °C)-98.6 °F (37 °C)] 97.8 °F (36.6 °C)  Heart Rate:  [] 87  Resp:  [16-18] 18  BP: (116-136)/(58-78) 132/72  Flow (L/min):  [2] 2      Physical Exam:  Constitutional: Awake sitting up in bed in NAD  ENT: Pink, moist mucous membranes   Respiratory: Nonlabored, symmetrical chest expansion, CTAB, RA  Cardiovascular: RRR, no M/R/G  Musculoskeletal: AGGARWAL; no LE edema bilaterally  Neurologic: Oriented x4, normal speech, no focal deficits, equal strength  Skin: No rashes on exposed skin  Psychiatric: Pleasant and cooperative; normal affect    Pertinent  and/or Most Recent Results     LAB RESULTS:      Lab 11/16/23  0956 11/15/23  0452 11/14/23 1911   WBC 15.69* 5.90 7.64   HEMOGLOBIN 12.1 14.3 14.7   HEMATOCRIT 37.4 43.3 44.0   PLATELETS 313 331 390   NEUTROS ABS  --  5.26 4.79   IMMATURE GRANS (ABS)  --  0.02 0.02   LYMPHS ABS  --  0.55* 2.11   MONOS ABS  --  0.06* 0.45   EOS ABS  --  0.00 0.23   MCV 93.3 91.4 90.3         Lab 11/16/23  0956 11/15/23  0452 11/14/23 1911   SODIUM 141 135* 140   POTASSIUM 4.4 5.4*  5.4* 3.5   CHLORIDE 106 104 102   CO2 26.0 23.0 28.0   ANION GAP 9.0 8.0 10.0   BUN 9 10 13   CREATININE 0.61 0.60 0.86   EGFR 122.0 122.5 92.2   GLUCOSE 199* 157* 142*   CALCIUM 8.2* 9.0 9.5   MAGNESIUM  --  2.4 2.2   HEMOGLOBIN A1C  --   --  5.20         Lab 11/14/23 1911   TOTAL PROTEIN 7.7   ALBUMIN 4.7   GLOBULIN 3.0   ALT (SGPT) 31   AST (SGOT) 23   BILIRUBIN 0.3   ALK PHOS 103                     Brief Urine Lab Results  (Last result in the past 365 days)         Color   Clarity   Blood   Leuk Est   Nitrite   Protein   CREAT   Urine HCG        11/15/23 1305               Negative             Microbiology Results (last 10 days)       ** No results found for the last 240 hours. **            Duplex Venous Lower Extremity - Left CAR    Result Date: 11/16/2023    Normal left lower extremity venous duplex scan.     FL C Arm During Surgery    Result Date: 11/15/2023  This procedure was auto-finalized with no dictation required.     Results for orders placed during the hospital encounter of 11/14/23    Duplex Venous Lower Extremity - Left CAR    Interpretation Summary    Normal left lower extremity venous duplex scan.      Results for orders placed during the hospital encounter of 11/14/23    Duplex Venous Lower Extremity - Left CAR    Interpretation Summary    Normal left lower extremity venous duplex scan.          Plan for Follow-up of Pending Labs/Results:     Discharge Details        Discharge Medications        New Medications        Instructions Start Date   diazePAM 5 MG tablet  Commonly known as: VALIUM   5 mg, Oral, Every 8 Hours PRN      sennosides-docusate 8.6-50 MG per tablet  Commonly known as: PERICOLACE   Take 1 tablet by mouth Daily. Take while using hydrocodone to prevent constipation.             Changes to Medications        Instructions Start Date   HYDROcodone-acetaminophen 5-325 MG per tablet  Commonly known as: NORCO  What changed: Another medication with the same name was added. Make sure you understand how and when to take each.   1 tablet, Oral, Every 12 Hours PRN      HYDROcodone-acetaminophen 5-325 MG per tablet  Commonly known as: NORCO  What changed: You were already taking a medication with the same name, and this prescription was added. Make sure you understand how and when to take each.   1 tablet, Oral, Every 4 Hours PRN             Continue These Medications        Instructions Start Date   diclofenac 75 MG EC tablet  Commonly known as:  "VOLTAREN   75 mg, Oral, 2 Times Daily      gabapentin 400 MG capsule  Commonly known as: NEURONTIN   400 mg, Oral, 3 Times Daily      loratadine 10 MG tablet  Commonly known as: CLARITIN   10 mg, Oral, Daily      methocarbamol 750 MG tablet  Commonly known as: ROBAXIN   750 mg      phentermine 37.5 MG tablet  Commonly known as: Adipex-P   37.5 mg, Oral, Every Morning Before Breakfast      tiZANidine 4 MG tablet  Commonly known as: Zanaflex   4 mg, Oral, Nightly PRN      traMADol 50 MG tablet  Commonly known as: ULTRAM   50 mg, Oral, Every 8 Hours PRN               Allergies   Allergen Reactions    Mastisol [Wound Dressing Adhesive] Rash     \"a terrible fire burning, blistered rash\"    Other Rash     Steri-strips         Discharge Disposition:  Home or Self Care    Diet:  Hospital:  Diet Order   Procedures    Diet: Regular/House Diet; Texture: Regular Texture (IDDSI 7); Fluid Consistency: Thin (IDDSI 0)       Diet Instructions       Diet: Regular/House Diet; Regular Texture (IDDSI 7); Thin (IDDSI 0)      Discharge Diet: Regular/House Diet    Texture: Regular Texture (IDDSI 7)    Fluid Consistency: Thin (IDDSI 0)             Activity:  Activity Instructions       Activity as Tolerated              Restrictions or Other Recommendations:  Per Dr Shultz       CODE STATUS:    Code Status and Medical Interventions:   Ordered at: 11/15/23 1611     Code Status (Patient has no pulse and is not breathing):    CPR (Attempt to Resuscitate)     Medical Interventions (Patient has pulse or is breathing):    Full Support       Future Appointments   Date Time Provider Department Center   11/30/2023 10:30 AM Leighann Castillo PA-C [facility]   1/19/2024  2:30 PM Africa Lowe APRN [facility]       Additional Instructions for the Follow-ups that You Need to Schedule       Discharge Follow-up with PCP   As directed       Currently Documented PCP:    Africa Lowe APRN    PCP Phone Number:    [phone]     Follow " Up Details: 1 week; please schedule appointment prior to patient's discharge        Discharge Follow-up with Specified Provider: Dr. Shultz; 2 weeks; please schedule appointment prior to patient's discharge   As directed      To: Dr. Shultz; 2 weeks; please schedule appointment prior to patient's discharge                      JULIANNE Barr  11/16/23      Time Spent on Discharge:  I spent  25  minutes on this discharge activity which included: face-to-face encounter with the patient, reviewing the data in the system, coordination of the care with the nursing staff as well as consultants, documentation, and entering orders.

## 2023-11-16 NOTE — PROGRESS NOTES
[facility]    NEUROSURGERY PROGRESS NOTE    11/16/23    Chief Complaint: Lumbar spondylosis    Subjective: Stable overnight.  Patient reports incisional pain.  She states her right leg symptoms have significantly improved.  She does report some cramping in her left calf.    1 Day Post-Op    Objective:     /70 (BP Location: Right arm, Patient Position: Lying)   Pulse 90   Temp 97.8 °F (36.6 °C) (Oral)   Resp 18   SpO2 98%        Physical Exam  Awake, alert and oriented x 3  Speech f/c  Opens eyes spontaneously  EOM intact bilaterally  Pupils 3mm reactive bilaterally  Face symmetric  Motor exam shows 5/5 strength in bilateral lower extremities  Normal sensation to light touch in all 4 extremities  No Gross's or clonus bilaterally  No pronator drift or dysmetria  Gait not assessed  Incision clean dry and intact, mild bruising        Intake/Output Summary (Last 24 hours) at 11/16/2023 0914  Last data filed at 11/16/2023 0714  Gross per 24 hour   Intake 970 ml   Output 1500 ml   Net -530 ml         Current Facility-Administered Medications:     acetaminophen (TYLENOL) tablet 650 mg, 650 mg, Oral, Q4H PRN, 650 mg at 11/16/23 0557 **OR** acetaminophen (TYLENOL) 160 MG/5ML oral solution 650 mg, 650 mg, Oral, Q4H PRN **OR** acetaminophen (TYLENOL) suppository 650 mg, 650 mg, Rectal, Q4H PRN, Medhat Shultz MD    sennosides-docusate (PERICOLACE) 8.6-50 MG per tablet 2 tablet, 2 tablet, Oral, BID, 2 tablet at 11/16/23 0822 **AND** polyethylene glycol (MIRALAX) packet 17 g, 17 g, Oral, Daily PRN **AND** bisacodyl (DULCOLAX) EC tablet 5 mg, 5 mg, Oral, Daily PRN **AND** bisacodyl (DULCOLAX) suppository 10 mg, 10 mg, Rectal, Daily PRN, Medhat Shultz MD    calcium carbonate (TUMS) chewable tablet 500 mg (200 mg elemental), 2 tablet, Oral, BID PRN, Medhat Shultz MD    Calcium Replacement - Follow Nurse / BPA Driven Protocol, , Does not apply, PRN,  Medhat Shultz MD    cetirizine (zyrTEC) tablet 10 mg, 10 mg, Oral, Daily, Medhat Shultz MD, 10 mg at 11/16/23 0822    dexAMETHasone (DECADRON) tablet 4 mg, 4 mg, Oral, Q6H, Medhat Shultz MD, 4 mg at 11/16/23 0552    diazePAM (VALIUM) tablet 5 mg, 5 mg, Oral, Q6H PRN, Medhat Shultz MD    docusate sodium (COLACE) capsule 100 mg, 100 mg, Oral, BID PRN, Medhat Shultz MD    Enoxaparin Sodium (LOVENOX) syringe 40 mg, 40 mg, Subcutaneous, Nightly, Medhat Shultz MD    gabapentin (NEURONTIN) capsule 400 mg, 400 mg, Oral, TID, Medhat Shutlz MD, 400 mg at 11/16/23 0822    [MAR Hold] HYDROcodone-acetaminophen (NORCO) 5-325 MG per tablet 1 tablet, 1 tablet, Oral, Q12H PRN, Amy Ernandez APRN, 1 tablet at 11/15/23 0852    HYDROcodone-acetaminophen (NORCO) 5-325 MG per tablet 2 tablet, 2 tablet, Oral, Q4H PRN, Medhat Shultz MD, 2 tablet at 11/15/23 2021    HYDROmorphone (DILAUDID) injection 0.5 mg, 0.5 mg, Intravenous, Q2H PRN, 0.5 mg at 11/15/23 0508 **AND** naloxone (NARCAN) injection 0.4 mg, 0.4 mg, Intravenous, Q5 Min PRN, Medhat Shultz MD    lactated ringers infusion, 9 mL/hr, Intravenous, Continuous, Medhat Shultz MD, Stopped at 11/15/23 1428    magnesium hydroxide (MILK OF MAGNESIA) suspension 10 mL, 10 mL, Oral, Daily PRN, Medhat Shultz MD    Magnesium Standard Dose Replacement - Follow Nurse / BPA Driven Protocol, , Does not apply, PRN, Medhat Shultz MD    methocarbamol (ROBAXIN) tablet 750 mg, 750 mg, Oral, Q8H, Medhat Shultz MD, 750 mg at 11/16/23 0552    naproxen (NAPROSYN) tablet 500 mg, 500 mg, Oral, BID With Meals, Medhat Shultz MD, 500 mg at 11/16/23 0822    nitroglycerin (NITROSTAT) SL tablet 0.4 mg, 0.4 mg, Sublingual, Q5 Min PRN, Medhat Shultz MD    ondansetron (ZOFRAN) tablet 4 mg, 4 mg, Oral, Q6H PRN, 4 mg at 11/16/23 0613 **OR** ondansetron (ZOFRAN) injection 4 mg, 4 mg, Intravenous, Q6H PRN, Medhat Shultz MD, 4 mg at 11/15/23  0018    Phosphorus Replacement - Follow Nurse / BPA Driven Protocol, , Does not apply, Carrington LYNNE Nicolas, MD    [Held by provider] Potassium Replacement - Follow Nurse / BPA Driven Protocol, , Does not apply, Awais LYNNE Lauren, APRN    sodium chloride 0.9 % flush 10 mL, 10 mL, Intravenous, Q12H, Medhat Shultz MD, 10 mL at 11/16/23 0826    sodium chloride 0.9 % flush 10 mL, 10 mL, Intravenous, Carrington LYNNE Nicolas, MD    sodium chloride 0.9 % flush 10 mL, 10 mL, Intravenous, PRN, Medhat Shultz MD    sodium chloride 0.9 % flush 3 mL, 3 mL, Intravenous, Q12H, Medhat Shultz MD, 3 mL at 11/16/23 0826    sodium chloride 0.9 % infusion 40 mL, 40 mL, Intravenous, Carrington LYNNE Nicolas, MD    sodium chloride 0.9 % infusion 40 mL, 40 mL, Intravenous, Carrington LYNNE Nicolas, MD    Laboratory Results:        Lab 11/15/23  0452 11/14/23 1911   WBC 5.90 7.64   HEMOGLOBIN 14.3 14.7   HEMATOCRIT 43.3 44.0   PLATELETS 331 390   NEUTROS ABS 5.26 4.79   IMMATURE GRANS (ABS) 0.02 0.02   LYMPHS ABS 0.55* 2.11   MONOS ABS 0.06* 0.45   EOS ABS 0.00 0.23   MCV 91.4 90.3         Lab 11/15/23  0452 11/14/23 1911   SODIUM 135* 140   POTASSIUM 5.4*  5.4* 3.5   CHLORIDE 104 102   CO2 23.0 28.0   ANION GAP 8.0 10.0   BUN 10 13   CREATININE 0.60 0.86   EGFR 122.5 92.2   GLUCOSE 157* 142*   CALCIUM 9.0 9.5   MAGNESIUM 2.4 2.2   HEMOGLOBIN A1C  --  5.20         Lab 11/14/23 1911   TOTAL PROTEIN 7.7   ALBUMIN 4.7   GLOBULIN 3.0   ALT (SGPT) 31   AST (SGOT) 23   BILIRUBIN 0.3   ALK PHOS 103                     Brief Urine Lab Results  (Last result in the past 365 days)        Color   Clarity   Blood   Leuk Est   Nitrite   Protein   CREAT   Urine HCG        11/15/23 1305               Negative             Microbiology Results (last 10 days)       ** No results found for the last 240 hours. **                     Diagnostic Imaging: I personally reviewed and interpreted the new imaging    Assessment and plan:  This is a  32-year-old female who is 1 day status post right L4-5 discectomy with Dr. Shultz.  Surgery went without complications.  She notes significant improvement in her right leg pain.  She does have some mild bruising around her incision.  She is neurologically intact on exam.  Pain controlled with PO pain meds.  From a neurosurgical perspective she can discharge home later today.  She will follow-up in the office in 2 weeks.       Any copied data from previous notes included in the (1) HPI, (2) PE, (3) MDM and/or Assessment and Plan has been reviewed and accurate as of 11/16/23.    Leighann Castillo PA-C  11/16/23  09:14 EST

## 2023-11-16 NOTE — PLAN OF CARE
Goal Outcome Evaluation:  Plan of Care Reviewed With: patient, spouse, family        Progress: improving  Outcome Evaluation: PT eval completed POD #1 L4-5 discectomy. Patient presents w/ post-surgical pain and weakness and decreased activity tolerance. She completed bed mobility w/out difficulty and ambulated in hallway w/ supervision and no LOB or instability. Provided education re: spinal precautions, gait mechanics/safety, and HEP. Pt. verbalized good understanding and maintained spinal precautions throughout. No further acute PT services indicated. Pt. appears functionally safe to D/C home w/ assist.      Anticipated Discharge Disposition (PT): home with assist

## 2023-11-16 NOTE — OUTREACH NOTE
Prep Survey      Flowsheet Row Responses   [facility] facility patient discharged from? [facility]   Is LACE score < 7 ? Yes   Eligibility [facility]   Date of Admission 11/14/23   Date of Discharge 11/16/23   Discharge Disposition Home or Self Care   Discharge diagnosis LUMBAR DISCECTOMY L4-5   Does the patient have one of the following disease processes/diagnoses(primary or secondary)? General Surgery   Does the patient have Home health ordered? No   Is there a DME ordered? No   Prep survey completed? Yes            Dariana HARRIS - Registered Nurse

## 2023-11-16 NOTE — PROGRESS NOTES
[facility] Medicine Services  PROGRESS NOTE    Patient Name: Nelsy Bill  : 1990  MRN: [id number]    Date of Admission: 2023  Primary Care Physician: Africa Lowe APRN    Subjective   Subjective   CC:  Low Back pain    HPI:  Patient sitting up in bed and states that her back pain is much better but she still having so.  All the numbness has resolved.  She is hoping for discharge home today.    Objective   Objective     Vital Signs:   Temp:  [97.6 °F (36.4 °C)-98.6 °F (37 °C)] 97.8 °F (36.6 °C)  Heart Rate:  [] 90  Resp:  [14-18] 18  BP: (115-136)/(58-79) 123/70  Flow (L/min):  [2] 2     Physical Exam:  Constitutional: Awake sitting up in bed in NAD  ENT: Pink, moist mucous membranes   Respiratory: Nonlabored, symmetrical chest expansion, CTAB, RA  Cardiovascular: RRR, no M/R/G  Musculoskeletal: AGGARWAL; no LE edema bilaterally  Neurologic: Oriented x4, normal speech, no focal deficits, equal strength  Skin: No rashes on exposed skin  Psychiatric: Pleasant and cooperative; normal affect    Results Reviewed:  LAB RESULTS:      Lab 11/15/23  0452 23   WBC 5.90 7.64   HEMOGLOBIN 14.3 14.7   HEMATOCRIT 43.3 44.0   PLATELETS 331 390   NEUTROS ABS 5.26 4.79   IMMATURE GRANS (ABS) 0.02 0.02   LYMPHS ABS 0.55* 2.11   MONOS ABS 0.06* 0.45   EOS ABS 0.00 0.23   MCV 91.4 90.3         Lab 11/15/23  0452 23   SODIUM 135* 140   POTASSIUM 5.4*  5.4* 3.5   CHLORIDE 104 102   CO2 23.0 28.0   ANION GAP 8.0 10.0   BUN 10 13   CREATININE 0.60 0.86   EGFR 122.5 92.2   GLUCOSE 157* 142*   CALCIUM 9.0 9.5   MAGNESIUM 2.4 2.2   HEMOGLOBIN A1C  --  5.20         Lab 23   TOTAL PROTEIN 7.7   ALBUMIN 4.7   GLOBULIN 3.0   ALT (SGPT) 31   AST (SGOT) 23   BILIRUBIN 0.3   ALK PHOS 103                     Brief Urine Lab Results  (Last result in the past 365 days)        Color   Clarity   Blood   Leuk Est   Nitrite   Protein   CREAT   Urine HCG         11/15/23 1305               Negative               Microbiology Results Abnormal       None            FL C Arm During Surgery    Result Date: 11/15/2023  This procedure was auto-finalized with no dictation required.         Current medications:  Scheduled Meds:cetirizine, 10 mg, Oral, Daily  dexAMETHasone, 4 mg, Oral, Q6H  enoxaparin, 40 mg, Subcutaneous, Nightly  gabapentin, 400 mg, Oral, TID  methocarbamol, 750 mg, Oral, Q8H  naproxen, 500 mg, Oral, BID With Meals  senna-docusate sodium, 2 tablet, Oral, BID  sodium chloride, 10 mL, Intravenous, Q12H  sodium chloride, 3 mL, Intravenous, Q12H      Continuous Infusions:lactated ringers, 9 mL/hr, Last Rate: Stopped (11/15/23 1428)      PRN Meds:.  acetaminophen **OR** acetaminophen **OR** acetaminophen    senna-docusate sodium **AND** polyethylene glycol **AND** bisacodyl **AND** bisacodyl    calcium carbonate    Calcium Replacement - Follow Nurse / BPA Driven Protocol    diazePAM    docusate sodium    [MAR Hold] HYDROcodone-acetaminophen    HYDROcodone-acetaminophen    HYDROmorphone **AND** naloxone    magnesium hydroxide    Magnesium Standard Dose Replacement - Follow Nurse / BPA Driven Protocol    nitroglycerin    ondansetron **OR** ondansetron    Phosphorus Replacement - Follow Nurse / BPA Driven Protocol    [Held by provider] Potassium Replacement - Follow Nurse / BPA Driven Protocol    sodium chloride    sodium chloride    sodium chloride    sodium chloride    Assessment & Plan   Assessment & Plan     Active Hospital Problems    Diagnosis  POA    **Lumbar herniated disc [M51.26]  Yes    HNP (herniated nucleus pulposus), lumbar [M51.26]  Unknown      Resolved Hospital Problems   No resolved problems to display.        Brief Hospital Course to date:  Nelsy Bill is a 32 y.o. female with PMH negative for any medical problems except back pain with radiation down her right leg since September.  She lives in [facility] and was going to go to the ER,  but her PCP intervened and called Dr. Shultz's office and because patient could no longer tolerate the pain, she was sent  as a direct admit on 11/14/2023  for Dr Shultz.  She is complaining of urgency, numbness in right leg and sleep disturbance d/t pain.  Hospital medicine will admit for further evaluation and treatment.     Herniated Disc  --S/p microlumbar discectomy per Dr Shultz on 11/15/2023 surgery  -- Pain control Home medications and   -- IV dilaudid dc'd  -- Decadron 4 mg Q6H  --Elevated glucose most likely d/t steroids    Left Calf Pain  --LLE venous doppler-normal    No other medical problems  --PRN Bowel meds ordered  --PRN Antiemetic medications  --PRN Antacids  --Electrolyte replacement protocol    Expected Discharge Location and Transportation:   Expected Discharge   Expected Discharge Date: 11/16/2023; Expected Discharge Time:      DVT prophylaxis:  Medical and mechanical DVT prophylaxis orders are present.     AM-PAC 6 Clicks Score (PT): 18 (11/15/23 2021)    CODE STATUS:   Code Status and Medical Interventions:   Ordered at: 11/15/23 1611     Code Status (Patient has no pulse and is not breathing):    CPR (Attempt to Resuscitate)     Medical Interventions (Patient has pulse or is breathing):    Full Support       Amy Ernandez, JULIANNE  11/16/23

## 2023-11-16 NOTE — THERAPY DISCHARGE NOTE
Patient Name: Nelsy Bill  : 1990    MRN: [id number]                              Today's Date: 2023       Admit Date: 2023    Visit Dx:     ICD-10-CM ICD-9-CM   1. Lumbar herniated disc  M51.26 722.10     Patient Active Problem List   Diagnosis    Rash of face    HNP (herniated nucleus pulposus), lumbar    Lumbar herniated disc    Pain of left calf     Past Medical History:   Diagnosis Date    Brain concussion     Soccer related    Headache     Chronic migraines that went away with medication    HTN in pregnancy, chronic 2021    Lumbosacral disc disease     Patient denies medical problems     Positive testing for group B Streptococcus 2018    Preeclampsia     Varicella      Past Surgical History:   Procedure Laterality Date     SECTION  ,     x's 2     SECTION WITH TUBAL N/A 2018    Procedure:  SECTION REPEAT;  Surgeon: Swetha Hernandez MD;  Location: [facility] LABOR DELIVERY;  Service: Obstetrics/Gynecology     SECTION WITH TUBAL N/A 2022    Procedure:  SECTION REPEAT WITH TUBAL;  Surgeon: Swetha Hernandez MD;  Location: [facility] OR;  Service: Obstetrics/Gynecology;  Laterality: N/A;    KNEE ACL RECONSTRUCTION      LUMBAR DISCECTOMY Right 11/15/2023    Procedure: LUMBAR DISCECTOMY L4-5;  Surgeon: Medhat Shultz MD;  Location: [facility] OR;  Service: Neurosurgery;  Laterality: Right;    TUBAL ABDOMINAL LIGATION      WISDOM TOOTH EXTRACTION        General Information       Row Name 23 1355          Physical Therapy Time and Intention    Document Type evaluation;discharge evaluation/summary  -MB     Mode of Treatment physical therapy  -MB       Row Name 23 7805          General Information    Patient Profile Reviewed yes  -MB     Prior Level of Function independent:;ADL's;bed mobility;transfer;gait;all household mobility;community mobility;driving;home management;work  -MB     Existing  Precautions/Restrictions spinal  -MB     Barriers to Rehab none identified  -MB       Row Name 11/16/23 1356          Living Environment    People in Home spouse;child(bree), dependent  -MB       Row Name 11/16/23 1356          Home Main Entrance    Number of Stairs, Main Entrance one  -MB     Stair Railings, Main Entrance none  -MB       Row Name 11/16/23 1356          Stairs Within Home, Primary    Number of Stairs, Within Home, Primary none  -MB       Row Name 11/16/23 1356          Cognition    Orientation Status (Cognition) oriented x 4  -MB       Row Name 11/16/23 1356          Safety Issues, Functional Mobility    Safety Issues Affecting Function (Mobility) safety precaution awareness  new learning deficits only  -MB     Impairments Affecting Function (Mobility) pain;range of motion (ROM);strength  -MB               User Key  (r) = Recorded By, (t) = Taken By, (c) = Cosigned By      Initials Name Provider Type    Lamar Dickson, PT Physical Therapist                   Mobility       Row Name 11/16/23 1504          Bed Mobility    Bed Mobility rolling right;sidelying-sit;sit-sidelying  -MB     Rolling Right [place] (Bed Mobility) supervision;verbal cues  -MB     Sidelying-Sit [place] (Bed Mobility) supervision;verbal cues  -MB     Sit-Sidelying [place] (Bed Mobility) supervision;verbal cues  -MB     Assistive Device (Bed Mobility) bed rails;draw sheet  -MB     Comment, (Bed Mobility) Provided education re: spinal precautions and log roll technique. Pt. completed bed mobility w/out difficulty, compliant w/ spinal precautions.  -MB       Row Name 11/16/23 1504          Transfers    Comment, (Transfers) VCs for safe hand placement and avoiding twisting while standing. No LOB or instability.  -MB       Row Name 11/16/23 1504          Sit-Stand Transfer    Sit-Stand [place] (Transfers) supervision  -MB       Row Name 11/16/23 1504          Gait/Stairs (Locomotion)    [place] Level  (Gait) supervision  -MB     Distance in Feet (Gait) 350  -MB     Deviations/Abnormal Patterns (Gait) ross decreased  -MB     [place] Level (Stairs) supervision  -MB     Number of Steps (Stairs) 1  -MB     Comment, (Gait/Stairs) Pt. ambulated in hallway w/ slightly slower pace. No LOB or instability.  -MB               User Key  (r) = Recorded By, (t) = Taken By, (c) = Cosigned By      Initials Name Provider Type    MB Lamar Ulloa, PT Physical Therapist                   Obj/Interventions       Row Name 11/16/23 1508          Range of Motion Comprehensive    General Range of Motion no range of motion deficits identified  -MB       Row Name 11/16/23 1508          Strength Comprehensive (MMT)    General Manual Muscle Testing (MMT) Assessment no strength deficits identified  -MB     Comment, General Manual Muscle Testing (MMT) Assessment BUEs/BLEs not formally tested to maintain spinal precautions. Pt. able to move against gravity.  -MB       Row Name 11/16/23 1508          Motor Skills    Therapeutic Exercise hip;knee;ankle  Issued and reviewed written/illustrated HEP w/ spinal precautions.  -MB       Row Name 11/16/23 1508          Hip (Therapeutic Exercise)    Hip (Therapeutic Exercise) AROM (active range of motion);isometric exercises  -MB     Hip AROM (Therapeutic Exercise) bilateral;external rotation;internal rotation  -MB     Hip Isometrics (Therapeutic Exercise) bilateral;gluteal sets  abd set, bent knee fall out  -MB       Row Name 11/16/23 1508          Knee (Therapeutic Exercise)    Knee (Therapeutic Exercise) strengthening exercise;isometric exercises  -MB     Knee Isometrics (Therapeutic Exercise) bilateral;quad sets  -MB     Knee Strengthening (Therapeutic Exercise) bilateral;LAQ (long arc quad)  -MB       Row Name 11/16/23 1508          Ankle (Therapeutic Exercise)    Ankle (Therapeutic Exercise) AROM (active range of motion)  -MB     Ankle AROM (Therapeutic Exercise)  bilateral;dorsiflexion;plantarflexion  -MB       Row Name 11/16/23 1508          Balance    Balance Assessment standing dynamic balance;standing static balance  -MB     Static Standing Balance independent  -MB     Dynamic Standing Balance supervision  -MB     Position/Device Used, Standing Balance unsupported  -MB     Balance Interventions standing;sit to stand;weight shifting activity;vision occluded activity;narrowed base of support  -MB       Row Name 11/16/23 1508          Sensory Assessment (Somatosensory)    Sensory Assessment (Somatosensory) LE sensation intact;UE sensation intact  -MB               User Key  (r) = Recorded By, (t) = Taken By, (c) = Cosigned By      Initials Name Provider Type    Lamar Dickson, PT Physical Therapist                   Goals/Plan    No documentation.                  Clinical Impression       Row Name 11/16/23 1514          Pain    Pretreatment Pain Rating 4/10  -MB     Posttreatment Pain Rating 4/10  -MB     Pain Location incisional  -MB     Pain Location - back  -MB     Pain Intervention(s) Ambulation/increased activity;Repositioned;Cold applied  -MB       Row Name 11/16/23 1514          Plan of Care Review    Plan of Care Reviewed With patient;spouse;family  -MB     Progress improving  -MB     Outcome Evaluation PT eval completed POD #1 L4-5 discectomy. Patient presents w/ post-surgical pain and weakness and decreased activity tolerance. She completed bed mobility w/out difficulty and ambulated in hallway w/ supervision and no LOB or instability. Provided education re: spinal precautions, gait mechanics/safety, and HEP. Pt. verbalized good understanding and maintained spinal precautions throughout. No further acute PT services indicated. Pt. appears functionally safe to D/C home w/ assist.  -MB       Row Name 11/16/23 1514          Therapy Assessment/Plan (PT)    Criteria for Skilled Interventions Met (PT) no;no problems identified which require skilled intervention   -MB     Therapy Frequency (PT) evaluation only  -MB       Row Name 11/16/23 1514          Positioning and Restraints    Pre-Treatment Position in bed  -MB     Post Treatment Position bed  -MB     In Bed notified nsg;supine;call light within reach;encouraged to call for assist;with family/caregiver  -MB               User Key  (r) = Recorded By, (t) = Taken By, (c) = Cosigned By      Initials Name Provider Type    Lamar Dickson, PT Physical Therapist                   Outcome Measures       Row Name 11/16/23 1518 11/16/23 0800       How much help from another person do you currently need...    Turning from your back to your side while in flat bed without using bedrails? 4  -MB 4  -PT    Moving from lying on back to sitting on the side of a flat bed without bedrails? 3  -MB 3  -PT    Moving to and from a bed to a chair (including a wheelchair)? 3  -MB 3  -PT    Standing up from a chair using your arms (e.g., wheelchair, bedside chair)? 3  -MB 3  -PT    Climbing 3-5 steps with a railing? 3  -MB 3  -PT    To walk in hospital room? 3  -MB 3  -PT    AM-PAC 6 Clicks Score (PT) 19  -MB 19  -PT    Highest Level of Mobility Goal 6 --> Walk 10 steps or more  -MB 6 --> Walk 10 steps or more  -PT      Row Name 11/16/23 1518          Functional Assessment    Outcome Measure Options AM-PAC 6 Clicks Basic Mobility (PT)  -MB               User Key  (r) = Recorded By, (t) = Taken By, (c) = Cosigned By      Initials Name Provider Type    Lamar Dickson, PT Physical Therapist    PT Olga Moreira RN Registered Nurse                  Physical Therapy Education       Title: PT OT SLP Therapies (Done)       Topic: Physical Therapy (Done)       Point: Mobility training (Done)       Learning Progress Summary             Patient Acceptance, E,MIGUEL, WENDY BAUER by MB at 11/16/2023 1518   Family Acceptance, E,MIGUEL, WENDY BAUER by MB at 11/16/2023 1518                         Point: Home exercise program (Done)       Learning Progress Summary              Patient Acceptance, E,D, VU,DU by MB at 11/16/2023 1518   Family Acceptance, E,D, VU,DU by MB at 11/16/2023 1518                         Point: Body mechanics (Done)       Learning Progress Summary             Patient Acceptance, E,D, VU,DU by MB at 11/16/2023 1518   Family Acceptance, E,D, VU,DU by MB at 11/16/2023 1518                         Point: Precautions (Done)       Learning Progress Summary             Patient Acceptance, E,D, VU,DU by MB at 11/16/2023 1518   Family Acceptance, E,D, VU,DU by MB at 11/16/2023 1518                                         User Key       Initials Effective Dates Name Provider Type Discipline    MB 06/16/21 -  Lamar Ulloa, PT Physical Therapist PT                  PT Recommendation and Plan     Plan of Care Reviewed With: patient, spouse, family  Progress: improving  Outcome Evaluation: PT eval completed POD #1 L4-5 discectomy. Patient presents w/ post-surgical pain and weakness and decreased activity tolerance. She completed bed mobility w/out difficulty and ambulated in hallway w/ supervision and no LOB or instability. Provided education re: spinal precautions, gait mechanics/safety, and HEP. Pt. verbalized good understanding and maintained spinal precautions throughout. No further acute PT services indicated. Pt. appears functionally safe to D/C home w/ assist.     Time Calculation:   PT Evaluation Complexity  History, PT Evaluation Complexity: 1-2 personal factors and/or comorbidities  Examination of Body Systems (PT Eval Complexity): total of 3 or more elements  Clinical Presentation (PT Evaluation Complexity): evolving  Clinical Decision Making (PT Evaluation Complexity): low complexity  Overall Complexity (PT Evaluation Complexity): low complexity     PT Charges       Row Name 11/16/23 1519             Time Calculation    Start Time 1356  -MB      PT Received On 11/16/23  -MB         Untimed Charges    PT Eval/Re-eval Minutes 46  -MB         Total  Minutes    Untimed Charges Total Minutes 46  -MB       Total Minutes 46  -MB                User Key  (r) = Recorded By, (t) = Taken By, (c) = Cosigned By      Initials Name Provider Type    Lamar Dickson, PT Physical Therapist                  Therapy Charges for Today       Code Description Service Date Service Provider Modifiers Qty    [id number] HC PT EVAL LOW COMPLEXITY 4 11/16/2023 Lamar Ulloa, PT GP 1            PT G-Codes  Outcome Measure Options: AM-PAC 6 Clicks Basic Mobility (PT)  AM-PAC 6 Clicks Score (PT): 19    PT Discharge Summary  Anticipated Discharge Disposition (PT): home with assist    Lamar Ulloa, PT  11/16/2023

## 2023-11-17 ENCOUNTER — DOCUMENTATION (OUTPATIENT)
Dept: NEUROSURGERY | Facility: CLINIC | Age: 33
End: 2023-11-17
Payer: COMMERCIAL

## 2023-11-17 ENCOUNTER — TRANSITIONAL CARE MANAGEMENT TELEPHONE ENCOUNTER (OUTPATIENT)
Dept: CALL CENTER | Facility: HOSPITAL | Age: 33
End: 2023-11-17
Payer: COMMERCIAL

## 2023-11-17 NOTE — PROGRESS NOTES
The patient called our office today and states she had 1 episode of incontinence this afternoon.  I spoke to the patient over the phone.  She states she stood up and began leaking urine out of her bladder.  She did not feel the urge or need to urinate.  Prior to this event, she has been voiding spontaneously and on her own since surgery.  She denies any significant back pain.  She has no leg pain or leg weakness.  After this incontinence episode, she went to the bathroom and was able to void on her own.  The patient has not had a bowel movement since surgery.  The patient does not seem to be having any concerning symptoms other than this 1 brief episode of incontinence.  We are going to increase her bowel regimen as I think her constipation could have contributed to this event.  In the absence of any new leg pain or leg weakness, I think we can monitor this conservatively.  I told the patient if she has another episode of incontinence, she should go to the emergency room to be further evaluated.  I also told the patient if she develops any new leg pain or leg weakness she should come to the emergency room for evaluation.  The patient was in agreement with the plan.  We will plan to touch base with the patient on Monday to see how she is doing.

## 2023-11-17 NOTE — OUTREACH NOTE
Call Center TCM Note      Flowsheet Row Responses   [facility] patient discharged from? [place]   Does the patient have one of the following disease processes/diagnoses(primary or secondary)? General Surgery   TCM attempt successful? Yes   Call start time 0844   Call end time 0849   Discharge diagnosis LUMBAR DISCECTOMY L4-5   Is patient permission given to speak with other caregiver? Yes   Person spoke with today (if not patient) and relationship LandyOctaviano Spouse   Meds reviewed with patient/caregiver? Yes   Does the patient have all medications related to this admission filled (includes all antibiotics, pain medications, etc.) Yes   Is the patient taking all medications as directed (includes completed medication regime)? Yes   Comments PCP [facility] WHITAKER. Hospital follow up in place for 11/22/23  3pm with PCP   Does the patient have an appointment with their PCP within 7-14 days of discharge? Yes   Has home health visited the patient within 72 hours of discharge? N/A   Psychosocial issues? No   Did the patient receive a copy of their discharge instructions? Yes   Nursing interventions Reviewed instructions with patient  [spouse]   What is the patient's perception of their health status since discharge? Improving   Nursing interventions Nurse provided patient education   Is the patient /caregiver able to teach back basic post-op care? Lifting as instructed by MD in discharge instructions  [Activity as tolerated]   Is the patient/caregiver able to teach back steps to recovery at home? Set small, achievable goals for return to baseline health, Rest and rebuild strength, gradually increase activity, Eat a well-balance diet  [Encouraged to have food on stomach prior to taking pain med to prevent nausea.]   If the patient is a current smoker, are they able to teach back resources for cessation? Not a smoker   Is the patient/caregiver able to teach back the hierarchy of who to call/visit for  symptoms/problems? PCP, Specialist, Home health nurse, Urgent Care, ED, 911 Yes   TCM call completed? Yes   Wrap up additional comments Post op at neurosurgery 11/30/23  1030am   Call end time 0849   Would this patient benefit from a Referral to [facility] Social Work? No   Is the patient interested in additional calls from an ambulatory ? No            Giovanna Lopez RN    11/17/2023, 08:51 EST

## 2023-11-20 ENCOUNTER — DOCUMENTATION (OUTPATIENT)
Dept: NEUROSURGERY | Facility: CLINIC | Age: 33
End: 2023-11-20
Payer: COMMERCIAL

## 2023-11-20 ENCOUNTER — TELEPHONE (OUTPATIENT)
Dept: NEUROSURGERY | Facility: CLINIC | Age: 33
End: 2023-11-20
Payer: COMMERCIAL

## 2023-11-20 NOTE — TELEPHONE ENCOUNTER
Provider: [place]   Surgery/Procedure:  LUMBAR DISCECTOMY L4-5   Surgery/Procedure Date: 11-  Last visit:   11-14-23  Next visit: 11-     Reason for call:  Patient called and wanted to know who she needs to talk to about her FMLA papers. Please Advise. Thank you.

## 2023-11-20 NOTE — PROGRESS NOTES
I called the patient back to check on her symptoms today.  She states she has not had any episodes of further incontinence since that one-time event last week.  Her pain is controlled with pain medications.  No issues with her incision.  No new or worsening leg pain or weakness.  Overall seems to be doing well.  We will see her at her follow-up on 11/30/2023.  Patient aware that if she develops any new or worsening symptoms that she should call the office.

## 2023-11-22 ENCOUNTER — HOSPITAL ENCOUNTER (EMERGENCY)
Facility: HOSPITAL | Age: 33
Discharge: HOME | End: 2023-11-22
Attending: EMERGENCY MEDICINE | Admitting: EMERGENCY MEDICINE
Payer: COMMERCIAL

## 2023-11-22 ENCOUNTER — APPOINTMENT (OUTPATIENT)
Dept: CT IMAGING | Facility: HOSPITAL | Age: 33
End: 2023-11-22
Payer: COMMERCIAL

## 2023-11-22 VITALS
SYSTOLIC BLOOD PRESSURE: 121 MMHG | OXYGEN SATURATION: 98 % | HEIGHT: 66 IN | WEIGHT: 215 LBS | BODY MASS INDEX: 34.55 KG/M2 | RESPIRATION RATE: 20 BRPM | HEART RATE: 93 BPM | TEMPERATURE: 98.1 F | DIASTOLIC BLOOD PRESSURE: 65 MMHG

## 2023-11-22 DIAGNOSIS — R19.7 NAUSEA VOMITING AND DIARRHEA: Primary | ICD-10-CM

## 2023-11-22 DIAGNOSIS — R11.2 NAUSEA VOMITING AND DIARRHEA: Primary | ICD-10-CM

## 2023-11-22 DIAGNOSIS — R10.84 GENERALIZED ABDOMINAL PAIN: ICD-10-CM

## 2023-11-22 LAB
ALBUMIN SERPL-MCNC: 4.2 G/DL (ref 3.5–5.2)
ALBUMIN/GLOB SERPL: 1.4 G/DL
ALP SERPL-CCNC: 140 U/L (ref 39–117)
ALT SERPL W P-5'-P-CCNC: 110 U/L (ref 1–33)
ANION GAP SERPL CALCULATED.3IONS-SCNC: 12 MMOL/L (ref 5–15)
AST SERPL-CCNC: 36 U/L (ref 1–32)
B-HCG UR QL: NEGATIVE
BACTERIA UR QL AUTO: ABNORMAL /HPF
BASOPHILS # BLD AUTO: 0.02 10*3/MM3 (ref 0–0.2)
BASOPHILS NFR BLD AUTO: 0.1 % (ref 0–1.5)
BILIRUB SERPL-MCNC: 0.8 MG/DL (ref 0–1.2)
BILIRUB UR QL STRIP: NEGATIVE
BUN SERPL-MCNC: 11 MG/DL (ref 6–20)
BUN/CREAT SERPL: 14.3 (ref 7–25)
CALCIUM SPEC-SCNC: 9.3 MG/DL (ref 8.6–10.5)
CHLORIDE SERPL-SCNC: 100 MMOL/L (ref 98–107)
CLARITY UR: CLEAR
CO2 SERPL-SCNC: 24 MMOL/L (ref 22–29)
COLOR UR: YELLOW
CREAT SERPL-MCNC: 0.77 MG/DL (ref 0.57–1)
DEPRECATED RDW RBC AUTO: 43.6 FL (ref 37–54)
EGFRCR SERPLBLD CKD-EPI 2021: 105.3 ML/MIN/1.73
EOSINOPHIL # BLD AUTO: 0.08 10*3/MM3 (ref 0–0.4)
EOSINOPHIL NFR BLD AUTO: 0.5 % (ref 0.3–6.2)
ERYTHROCYTE [DISTWIDTH] IN BLOOD BY AUTOMATED COUNT: 13.5 % (ref 12.3–15.4)
EXPIRATION DATE: NORMAL
GLOBULIN UR ELPH-MCNC: 3.1 GM/DL
GLUCOSE SERPL-MCNC: 132 MG/DL (ref 65–99)
GLUCOSE UR STRIP-MCNC: NEGATIVE MG/DL
HCT VFR BLD AUTO: 49.9 % (ref 34–46.6)
HGB BLD-MCNC: 16.5 G/DL (ref 12–15.9)
HGB UR QL STRIP.AUTO: ABNORMAL
HOLD SPECIMEN: NORMAL
HYALINE CASTS UR QL AUTO: ABNORMAL /LPF
IMM GRANULOCYTES # BLD AUTO: 0.08 10*3/MM3 (ref 0–0.05)
IMM GRANULOCYTES NFR BLD AUTO: 0.5 % (ref 0–0.5)
INTERNAL NEGATIVE CONTROL: NORMAL
INTERNAL POSITIVE CONTROL: NORMAL
KETONES UR QL STRIP: ABNORMAL
LEUKOCYTE ESTERASE UR QL STRIP.AUTO: ABNORMAL
LIPASE SERPL-CCNC: 13 U/L (ref 13–60)
LYMPHOCYTES # BLD AUTO: 1.11 10*3/MM3 (ref 0.7–3.1)
LYMPHOCYTES NFR BLD AUTO: 7.5 % (ref 19.6–45.3)
Lab: NORMAL
MCH RBC QN AUTO: 30 PG (ref 26.6–33)
MCHC RBC AUTO-ENTMCNC: 33.1 G/DL (ref 31.5–35.7)
MCV RBC AUTO: 90.7 FL (ref 79–97)
MONOCYTES # BLD AUTO: 0.97 10*3/MM3 (ref 0.1–0.9)
MONOCYTES NFR BLD AUTO: 6.6 % (ref 5–12)
NEUTROPHILS NFR BLD AUTO: 12.46 10*3/MM3 (ref 1.7–7)
NEUTROPHILS NFR BLD AUTO: 84.8 % (ref 42.7–76)
NITRITE UR QL STRIP: NEGATIVE
NRBC BLD AUTO-RTO: 0 /100 WBC (ref 0–0.2)
PH UR STRIP.AUTO: <=5 [PH] (ref 5–8)
PLATELET # BLD AUTO: 428 10*3/MM3 (ref 140–450)
PMV BLD AUTO: 8.7 FL (ref 6–12)
POTASSIUM SERPL-SCNC: 4 MMOL/L (ref 3.5–5.2)
PROT SERPL-MCNC: 7.3 G/DL (ref 6–8.5)
PROT UR QL STRIP: ABNORMAL
RBC # BLD AUTO: 5.5 10*6/MM3 (ref 3.77–5.28)
RBC # UR STRIP: ABNORMAL /HPF
REF LAB TEST METHOD: ABNORMAL
SODIUM SERPL-SCNC: 136 MMOL/L (ref 136–145)
SP GR UR STRIP: 1.02 (ref 1–1.03)
SQUAMOUS #/AREA URNS HPF: ABNORMAL /HPF
UROBILINOGEN UR QL STRIP: ABNORMAL
WBC # UR STRIP: ABNORMAL /HPF
WBC NRBC COR # BLD AUTO: 14.72 10*3/MM3 (ref 3.4–10.8)
WHOLE BLOOD HOLD COAG: NORMAL
WHOLE BLOOD HOLD SPECIMEN: NORMAL

## 2023-11-22 PROCEDURE — 85025 COMPLETE CBC W/AUTO DIFF WBC: CPT | Performed by: EMERGENCY MEDICINE

## 2023-11-22 PROCEDURE — 96376 TX/PRO/DX INJ SAME DRUG ADON: CPT

## 2023-11-22 PROCEDURE — 80053 COMPREHEN METABOLIC PANEL: CPT | Performed by: EMERGENCY MEDICINE

## 2023-11-22 PROCEDURE — 25510000001 IOPAMIDOL PER 1 ML: Performed by: EMERGENCY MEDICINE

## 2023-11-22 PROCEDURE — 99285 EMERGENCY DEPT VISIT HI MDM: CPT

## 2023-11-22 PROCEDURE — 25010000002 HYDROMORPHONE 1 MG/ML SOLUTION: Performed by: EMERGENCY MEDICINE

## 2023-11-22 PROCEDURE — 71275 CT ANGIOGRAPHY CHEST: CPT

## 2023-11-22 PROCEDURE — 25010000002 ONDANSETRON PER 1 MG: Performed by: NURSE PRACTITIONER

## 2023-11-22 PROCEDURE — 96374 THER/PROPH/DIAG INJ IV PUSH: CPT

## 2023-11-22 PROCEDURE — 74177 CT ABD & PELVIS W/CONTRAST: CPT

## 2023-11-22 PROCEDURE — 25810000003 SODIUM CHLORIDE 0.9 % SOLUTION: Performed by: NURSE PRACTITIONER

## 2023-11-22 PROCEDURE — 93005 ELECTROCARDIOGRAM TRACING: CPT | Performed by: NURSE PRACTITIONER

## 2023-11-22 PROCEDURE — 25010000002 MORPHINE PER 10 MG: Performed by: EMERGENCY MEDICINE

## 2023-11-22 PROCEDURE — 96375 TX/PRO/DX INJ NEW DRUG ADDON: CPT

## 2023-11-22 PROCEDURE — 81001 URINALYSIS AUTO W/SCOPE: CPT | Performed by: EMERGENCY MEDICINE

## 2023-11-22 PROCEDURE — 83690 ASSAY OF LIPASE: CPT | Performed by: EMERGENCY MEDICINE

## 2023-11-22 PROCEDURE — 81025 URINE PREGNANCY TEST: CPT | Performed by: EMERGENCY MEDICINE

## 2023-11-22 RX ORDER — MORPHINE SULFATE 4 MG/ML
4 INJECTION, SOLUTION INTRAMUSCULAR; INTRAVENOUS ONCE
Status: DISCONTINUED | OUTPATIENT
Start: 2023-11-22 | End: 2023-11-22 | Stop reason: ALTCHOICE

## 2023-11-22 RX ORDER — HYDROCODONE BITARTRATE AND ACETAMINOPHEN 5; 325 MG/1; MG/1
1 TABLET ORAL ONCE
Status: DISCONTINUED | OUTPATIENT
Start: 2023-11-22 | End: 2023-11-22

## 2023-11-22 RX ORDER — DICYCLOMINE HCL 20 MG
20 TABLET ORAL EVERY 6 HOURS
Qty: 24 TABLET | Refills: 0 | Status: SHIPPED | OUTPATIENT
Start: 2023-11-22 | End: 2023-11-30

## 2023-11-22 RX ORDER — ONDANSETRON 2 MG/ML
4 INJECTION INTRAMUSCULAR; INTRAVENOUS ONCE
Status: COMPLETED | OUTPATIENT
Start: 2023-11-22 | End: 2023-11-22

## 2023-11-22 RX ORDER — ONDANSETRON 4 MG/1
4 TABLET, ORALLY DISINTEGRATING ORAL 4 TIMES DAILY PRN
Qty: 16 TABLET | Refills: 0 | Status: SHIPPED | OUTPATIENT
Start: 2023-11-22 | End: 2023-11-30

## 2023-11-22 RX ORDER — SODIUM CHLORIDE 0.9 % (FLUSH) 0.9 %
10 SYRINGE (ML) INJECTION AS NEEDED
Status: DISCONTINUED | OUTPATIENT
Start: 2023-11-22 | End: 2023-11-22 | Stop reason: HOSPADM

## 2023-11-22 RX ORDER — MORPHINE SULFATE 4 MG/ML
4 INJECTION, SOLUTION INTRAMUSCULAR; INTRAVENOUS ONCE
Status: COMPLETED | OUTPATIENT
Start: 2023-11-22 | End: 2023-11-22

## 2023-11-22 RX ORDER — SODIUM CHLORIDE 9 MG/ML
10 INJECTION INTRAVENOUS AS NEEDED
Status: DISCONTINUED | OUTPATIENT
Start: 2023-11-22 | End: 2023-11-22 | Stop reason: HOSPADM

## 2023-11-22 RX ADMIN — MORPHINE SULFATE 4 MG: 4 INJECTION, SOLUTION INTRAMUSCULAR; INTRAVENOUS at 20:12

## 2023-11-22 RX ADMIN — SODIUM CHLORIDE 1000 ML: 9 INJECTION, SOLUTION INTRAVENOUS at 20:09

## 2023-11-22 RX ADMIN — HYDROMORPHONE HYDROCHLORIDE 1 MG: 1 INJECTION, SOLUTION INTRAMUSCULAR; INTRAVENOUS; SUBCUTANEOUS at 21:35

## 2023-11-22 RX ADMIN — IOPAMIDOL 85 ML: 755 INJECTION, SOLUTION INTRAVENOUS at 18:25

## 2023-11-22 RX ADMIN — ONDANSETRON 4 MG: 2 INJECTION INTRAMUSCULAR; INTRAVENOUS at 20:34

## 2023-11-22 RX ADMIN — ONDANSETRON 4 MG: 2 INJECTION INTRAMUSCULAR; INTRAVENOUS at 17:16

## 2023-11-22 NOTE — ED PROVIDER NOTES
EMERGENCY DEPARTMENT ENCOUNTER    Pt Name: Nelsy Bill  MRN: [id number]  Pt :   1990  Room Number:  10/10  Date of encounter:  2023  PCP: Africa Lowe APRN  ED Provider: JULIANNE Wills    Historian: Patient    HPI:  Chief Complaint: Nausea, vomiting, diarrhea    Context: Nelsy Bill is a 32 y.o. female who presents to the ED c/o nausea, vomiting, diarrhea.  Patient explains that she had a spinal surgery 1 week ago.  Dr. Argueta is her surgeon.  She advises that she had not had a bowel movement over the past week.  She did have 1 last night.  Initially it was constipation followed by diarrhea.  Patient has now had nausea, vomiting and diarrhea.  She complains of some abdominal discomfort.  Pain is primarily in her right upper quadrant patient is noted to be tachycardic.  Documented rate of 148.  She denies chest pain, shortness of breath.  She does complain of pain across her low back.  She denies fevers and chills.  Incision site shows no sign of infection.  HPI     REVIEW OF SYSTEMS  A chief complaint appropriate review of systems was completed and is negative except as noted in the HPI.     PAST MEDICAL HISTORY  Past Medical History:   Diagnosis Date    Brain concussion     Soccer related    Headache     Chronic migraines that went away with medication    HTN in pregnancy, chronic 2021    Lumbosacral disc disease     Patient denies medical problems     Positive testing for group B Streptococcus 2018    Preeclampsia 2010    Varicella 2000       PAST SURGICAL HISTORY  Past Surgical History:   Procedure Laterality Date     SECTION  ,     x's 2     SECTION WITH TUBAL N/A 2018    Procedure:  SECTION REPEAT;  Surgeon: Swetha Hernandez MD;  Location: [facility] LABOR DELIVERY;  Service: Obstetrics/Gynecology     SECTION WITH TUBAL N/A 2022    Procedure:  SECTION REPEAT WITH TUBAL;  Surgeon: Land,  Swetha MUELLER MD;  Location: [facility] OR;  Service: Obstetrics/Gynecology;  Laterality: N/A;    KNEE ACL RECONSTRUCTION      LUMBAR DISCECTOMY Right 11/15/2023    Procedure: LUMBAR DISCECTOMY L4-5;  Surgeon: Medhat Shultz MD;  Location: [facility] OR;  Service: Neurosurgery;  Laterality: Right;    TUBAL ABDOMINAL LIGATION      WISDOM TOOTH EXTRACTION  2011       FAMILY HISTORY  Family History   Problem Relation Age of Onset    Prostate cancer Paternal Grandfather     Cancer Paternal Grandmother         Skin    Lung cancer Maternal Grandmother     Cancer Maternal Grandmother         Lung    Prostate cancer Maternal Grandfather     Cancer Maternal Grandfather         Prostate    Miscarriages / Stillbirths Mother         Miscarriage       SOCIAL HISTORY  Social History     Socioeconomic History    Marital status:    Tobacco Use    Smoking status: Never     Passive exposure: Never    Smokeless tobacco: Never   Vaping Use    Vaping Use: Never used   Substance and Sexual Activity    Alcohol use: No    Drug use: No    Sexual activity: Yes     Partners: Male     Birth control/protection: Tubal ligation     Comment: Plans bilateral tubal ligation        ALLERGIES  Mastisol [wound dressing adhesive] and Other    PHYSICAL EXAM  Physical Exam  Vitals and nursing note reviewed.   Constitutional:       General: She is in acute distress.      Appearance: Normal appearance. She is ill-appearing.   HENT:      Head: Normocephalic and atraumatic.      Right Ear: Tympanic membrane normal.      Nose: Nose normal.      Mouth/Throat:      Mouth: Mucous membranes are moist.   Eyes:      Extraocular Movements: Extraocular movements intact.      Pupils: Pupils are equal, round, and reactive to light.   Cardiovascular:      Rate and Rhythm: Regular rhythm. Tachycardia present.      Pulses: Normal pulses.   Pulmonary:      Effort: Pulmonary effort is normal. No respiratory distress.      Breath sounds: Normal breath sounds. No wheezing.    Abdominal:      General: Bowel sounds are normal. There is no distension.      Tenderness: There is abdominal tenderness (RUQ pain.).   Musculoskeletal:      Cervical back: Normal range of motion and neck supple.      Lumbar back: No swelling, edema or tenderness. Normal range of motion.        Back:    Skin:     General: Skin is warm and dry.   Neurological:      General: No focal deficit present.      Mental Status: She is alert and oriented to person, place, and time.           LAB RESULTS  Results for orders placed or performed during the hospital encounter of 11/22/23   Comprehensive Metabolic Panel    Specimen: Blood   Result Value Ref Range    Glucose 132 (H) 65 - 99 mg/dL    BUN 11 6 - 20 mg/dL    Creatinine 0.77 0.57 - 1.00 mg/dL    Sodium 136 136 - 145 mmol/L    Potassium 4.0 3.5 - 5.2 mmol/L    Chloride 100 98 - 107 mmol/L    CO2 24.0 22.0 - 29.0 mmol/L    Calcium 9.3 8.6 - 10.5 mg/dL    Total Protein 7.3 6.0 - 8.5 g/dL    Albumin 4.2 3.5 - 5.2 g/dL    ALT (SGPT) 110 (H) 1 - 33 U/L    AST (SGOT) 36 (H) 1 - 32 U/L    Alkaline Phosphatase 140 (H) 39 - 117 U/L    Total Bilirubin 0.8 0.0 - 1.2 mg/dL    Globulin 3.1 gm/dL    A/G Ratio 1.4 g/dL    BUN/Creatinine Ratio 14.3 7.0 - 25.0    Anion Gap 12.0 5.0 - 15.0 mmol/L    eGFR 105.3 >60.0 mL/min/1.73   Lipase    Specimen: Blood   Result Value Ref Range    Lipase 13 13 - 60 U/L   Urinalysis With Microscopic If Indicated (No Culture) - Urine, Clean Catch    Specimen: Urine, Clean Catch   Result Value Ref Range    Color, UA Yellow Yellow, Straw    Appearance, UA Clear Clear    pH, UA <=5.0 5.0 - 8.0    Specific Gravity, UA 1.024 1.001 - 1.030    Glucose, UA Negative Negative    Ketones, UA Trace (A) Negative    Bilirubin, UA Negative Negative    Blood, UA Small (1+) (A) Negative    Protein, UA Trace (A) Negative    Leuk Esterase, UA Small (1+) (A) Negative    Nitrite, UA Negative Negative    Urobilinogen, UA 0.2 E.U./dL 0.2 - 1.0 E.U./dL   CBC Auto Differential     Specimen: Blood   Result Value Ref Range    WBC 14.72 (H) 3.40 - 10.80 10*3/mm3    RBC 5.50 (H) 3.77 - 5.28 10*6/mm3    Hemoglobin 16.5 (H) 12.0 - 15.9 g/dL    Hematocrit 49.9 (H) 34.0 - 46.6 %    MCV 90.7 79.0 - 97.0 fL    MCH 30.0 26.6 - 33.0 pg    MCHC 33.1 31.5 - 35.7 g/dL    RDW 13.5 12.3 - 15.4 %    RDW-SD 43.6 37.0 - 54.0 fl    MPV 8.7 6.0 - 12.0 fL    Platelets 428 140 - 450 10*3/mm3    Neutrophil % 84.8 (H) 42.7 - 76.0 %    Lymphocyte % 7.5 (L) 19.6 - 45.3 %    Monocyte % 6.6 5.0 - 12.0 %    Eosinophil % 0.5 0.3 - 6.2 %    Basophil % 0.1 0.0 - 1.5 %    Immature Grans % 0.5 0.0 - 0.5 %    Neutrophils, Absolute 12.46 (H) 1.70 - 7.00 10*3/mm3    Lymphocytes, Absolute 1.11 0.70 - 3.10 10*3/mm3    Monocytes, Absolute 0.97 (H) 0.10 - 0.90 10*3/mm3    Eosinophils, Absolute 0.08 0.00 - 0.40 10*3/mm3    Basophils, Absolute 0.02 0.00 - 0.20 10*3/mm3    Immature Grans, Absolute 0.08 (H) 0.00 - 0.05 10*3/mm3    nRBC 0.0 0.0 - 0.2 /100 WBC   Urinalysis, Microscopic Only - Urine, Clean Catch    Specimen: Urine, Clean Catch   Result Value Ref Range    RBC, UA 6-10 (A) None Seen, 0-2 /HPF    WBC, UA 11-20 (A) None Seen, 0-2 /HPF    Bacteria, UA 1+ (A) None Seen, Trace /HPF    Squamous Epithelial Cells, UA 3-6 (A) None Seen, 0-2 /HPF    Hyaline Casts, UA 13-20 0 - 6 /LPF    Methodology Manual Light Microscopy    POC Urine Pregnancy    Specimen: Urine   Result Value Ref Range    HCG, Urine, QL Negative Negative    Lot Number [id number]     Internal Positive Control Passed Positive, Passed    Internal Negative Control Passed Negative, Passed    Expiration Date 1,302,025    ECG 12 Lead Tachycardia   Result Value Ref Range    QT Interval 316 ms    QTC Interval 450 ms   Green Top (Gel)   Result Value Ref Range    Extra Tube Hold for add-ons.    Lavender Top   Result Value Ref Range    Extra Tube hold for add-on    Gold Top - SST   Result Value Ref Range    Extra Tube Hold for add-ons.    Gray Top   Result Value Ref Range     Extra Tube Hold for add-ons.    Light Blue Top   Result Value Ref Range    Extra Tube Hold for add-ons.        If labs were ordered, I independently reviewed the results and considered them in treating the patient.    RADIOLOGY  CT Angiogram Chest   Final Result   Impression:   No evidence of pulmonary embolism. No evidence of aortic aneurysm or dissection.      No acute intrathoracic process evident.      No acute intra-abdominal process evident.            Electronically Signed: Dinesh Brooks MD     11/22/2023 6:34 PM EST     Workstation ID: [id number]      CT Abdomen Pelvis With Contrast   Final Result   Impression:   No evidence of pulmonary embolism. No evidence of aortic aneurysm or dissection.      No acute intrathoracic process evident.      No acute intra-abdominal process evident.            Electronically Signed: Dinesh Brooks MD     11/22/2023 6:34 PM EST     Workstation ID: [id number]        [x] Radiologist's Report Reviewed:  I ordered and independently reviewed the above noted radiographic studies.  See radiologist's dictation for official interpretation.      PROCEDURES    Procedures    ECG 12 Lead Tachycardia   Preliminary Result   Test Reason : Tachycardia   Blood Pressure :   */*   mmHG   Vent. Rate : 122 BPM     Atrial Rate : 122 BPM      P-R Int : 128 ms          QRS Dur :  80 ms       QT Int : 316 ms       P-R-T Axes :  39  97  35 degrees      QTc Int : 450 ms      Sinus tachycardia   Rightward axis   Borderline ECG   No previous ECGs available      Referred By: ED MD           Confirmed By:           MEDICATIONS GIVEN IN ER    Medications   Sodium Chloride (PF) 0.9 % 10 mL (has no administration in time range)   sodium chloride 0.9 % flush 10 mL (has no administration in time range)   sodium chloride 0.9 % bolus 1,000 mL (0 mL Intravenous Stopped 11/22/23 2134)   ondansetron (ZOFRAN) injection 4 mg (4 mg Intravenous Given 11/22/23 1716)   iopamidol (ISOVUE-370) 76 % injection 100 mL (85 mL  Intravenous Given 11/22/23 1825)   Morphine sulfate (PF) injection 4 mg (4 mg Intravenous Given 11/22/23 2012)   ondansetron (ZOFRAN) injection 4 mg (4 mg Intravenous Given 11/22/23 2034)   HYDROmorphone (DILAUDID) injection 1 mg (1 mg Intravenous Given 11/22/23 2135)       MEDICAL DECISION MAKING, PROGRESS, and CONSULTS   Medical Decision Making  Nelsy Bill is a 32 y.o. female who presents to the ED c/o nausea, vomiting, diarrhea.  Patient explains that she had a spinal surgery 1 week ago.  Dr. Argueta is her surgeon.  She advises that she had not had a bowel movement over the past week.  She did have 1 last night.  Initially it was constipation followed by diarrhea.  Patient has now had nausea, vomiting and diarrhea.  She complains of some abdominal discomfort.  Pain is primarily in her right upper quadrant patient is noted to be tachycardic.  Documented rate of 148.  She denies chest pain, shortness of breath.  She does complain of pain across her low back.  She denies fevers and chills.  Incision site shows no sign of infection.      Problems Addressed:  Generalized abdominal pain: complicated acute illness or injury     Details: CT imaging is negative for acute findings.  Nausea vomiting and diarrhea: complicated acute illness or injury     Details: Pt was administered IV fluids, Zofran via IV.  Patient reported relief with medication.  We will discharge the patient home with Zofran 4 mg ODT and Bentyl 20 mg.    Amount and/or Complexity of Data Reviewed  Labs: ordered. Decision-making details documented in ED Course.  Radiology: ordered. Decision-making details documented in ED Course.  ECG/medicine tests: ordered. Decision-making details documented in ED Course.    Risk  Prescription drug management.        All labs have been independently reviewed by me.  All radiology studies have been reviewed by me and the radiologist dictating the report.  All EKG's have been independently viewed by me.    []  Discussed with radiology regarding test interpretation:    Discussion below represents my analysis of pertinent findings related to patient's condition, differential diagnosis, treatment plan and final disposition.    Differential diagnosis:  The differential diagnosis associated with the patient's presentation includes: Cholecystitis, SBO, colitis, gastroenteritis, dehydration, electrolyte imbalance    Additional sources  Discussed/ obtained information from independent historians:   [] Spouse  [] Parent  [] Family member  [] Friend  [] EMS   [] Other:  External (non-ED) record review:   [x] Inpatient record:   [] Office record:   [x] Outpatient record:   [x] Prior Outpatient labs:   [x] Prior Outpatient radiology:   [] Primary Care record:   [] Outside ED record:   [] Other:   Patient's care impacted by:   [] Diabetes  [x] Hypertension  [] Hyperlipidemia  [] Hypothyroidism   [] Coronary Artery Disease   [] COPD   [] Cancer   [] Obesity  [] GERD   [] Tobacco Abuse   [] Substance Abuse    [] Anxiety   [] Depression   [x] Other: Recent surgical procedure.  Care significantly affected by Social Determinants of Health (housing and economic circumstances, unemployment)    [] Yes     [x] No   If yes, Patient's care significantly limited by  Social Determinants of Health including:   [] Inadequate housing   [] Low income   [] Alcoholism and drug addiction in family   [] Problems related to primary support group   [] Unemployment   [] Problems related to employment   [] Other Social Determinants of Health:     Shared decision making: Shared decision making with patient.  CT imaging is negative for acute findings.  We will discharge the patient home.  Patient to follow-up with primary care.  Patient to take meds as ordered.    Orders placed during this visit:  Orders Placed This Encounter   Procedures    CT Angiogram Chest    CT Abdomen Pelvis With Contrast    [place] Draw    Comprehensive Metabolic Panel    Lipase     Urinalysis With Microscopic If Indicated (No Culture) - Urine, Clean Catch    CBC Auto Differential    Urinalysis, Microscopic Only - Urine, Clean Catch    NPO Diet NPO Type: Strict NPO    Undress & Gown    POC Urine Pregnancy    ECG 12 Lead Tachycardia    Insert Peripheral IV    Insert Peripheral IV    CBC & Differential    Green Top (Gel)    Lavender Top    Gold Top - SST    Gray Top    Light Blue Top       I considered prescription management  with:   [] Pain medication  [] Antiviral  [] Antibiotic   [] Other:   Rationale:  Additional orders considered but not ordered:  The following testing was considered but ultimately not selected after discussion with patient/family:    ED Course:    ED Course as of 11/22/23 2351   Wed Nov 22, 2023   1759 WBC(!): 14.72 [KG]   1759 Hemoglobin(!): 16.5 [KG]   1759 Hematocrit(!): 49.9 [KG]   1759 Leukocytes, UA(!): Small (1+) [KG]   1759 Blood, UA(!): Small (1+) [KG]   1816 Alkaline Phosphatase(!): 140 [KG]   1816 AST (SGOT)(!): 36 [KG]   1816 ALT (SGPT)(!): 110 [KG]   1816 Glucose(!): 132 [KG]   1816 EKG reviewed sinus tachycardia 122 no obvious sign of ischemia, injury or infarct. [KG]   1929 CTA reviewed, CT of the abdomen and pelvis reviewed. IMPRESSION:  Impression:  No evidence of pulmonary embolism. No evidence of aortic aneurysm or dissection.     No acute intrathoracic process evident.     No acute intra-abdominal process evident.      [KG]   2144 Leukocytes, UA(!): Small (1+) [KG]   2144 Bacteria, UA(!): 1+ [KG]   2144 WBC, UA(!): 11-20 [KG]   2144 RBC, UA(!): 6-10 [KG]      ED Course User Index  [KG] Irene Fountain, JULIANNE            DIAGNOSIS  Final diagnoses:   Nausea vomiting and diarrhea   Generalized abdominal pain       DISPOSITION    DISCHARGE    Patient discharged in stable condition.    Reviewed implications of results, diagnosis, meds, responsibility to follow up, warning signs and symptoms of possible worsening, potential complications and reasons to  return to ER.    Patient/Family voiced understanding of above instructions.    Discussed plan for discharge, as there is no emergent indication for admission.  Pt/family is agreeable and understands need for follow up and possible repeat testing.  Pt/family is aware that discharge does not mean that nothing is wrong but that it indicates no emergency is currently present that requires admission and they must continue care with follow-up as given below or with a physician of their choice.     FOLLOW-UP  Africa Lowe, APRN  [address] [facility] [address] [phone]               Medication List        New Prescriptions      dicyclomine 20 MG tablet  Commonly known as: BENTYL  Take 1 tablet by mouth Every 6 (Six) Hours.     ondansetron ODT 4 MG disintegrating tablet  Commonly known as: ZOFRAN-ODT  Place 1 tablet on the tongue 4 (Four) Times a Day As Needed for Nausea or Vomiting.               Where to Get Your Medications        These medications were sent to [address] - [phone]  - [phone] [address] [address]      Phone: [phone]   dicyclomine 20 MG tablet  ondansetron ODT 4 MG disintegrating tablet          ED Disposition       ED Disposition   Discharge    Condition   Stable    Comment   --               Please note that portions of this document were completed with voice recognition software.       Irene Fountain, APRN  11/22/23 1246

## 2023-11-22 NOTE — TELEPHONE ENCOUNTER
Left VM to get info or answer questions on [place] paperwork. A form has not been received in the office yet. Form can be faxed or emailed, if patient not able to drop off the form at the office in person.

## 2023-11-23 LAB
QT INTERVAL: 316 MS
QTC INTERVAL: 450 MS

## 2023-11-23 NOTE — DISCHARGE INSTRUCTIONS
As discussed no surgical emergency is found.    We will discharge you home with nausea medication.  I have also ordered Bentyl for abdominal cramping.  Follow-up with primary care as needed.  Return to the ER as needed.

## 2023-11-26 DIAGNOSIS — M51.26 LUMBAR HERNIATED DISC: ICD-10-CM

## 2023-11-26 RX ORDER — DIAZEPAM 5 MG/1
5 TABLET ORAL EVERY 8 HOURS PRN
Qty: 6 TABLET | Refills: 0 | Status: SHIPPED | OUTPATIENT
Start: 2023-11-26

## 2023-11-26 RX ORDER — HYDROCODONE BITARTRATE AND ACETAMINOPHEN 5; 325 MG/1; MG/1
1 TABLET ORAL EVERY 4 HOURS PRN
Qty: 8 TABLET | Refills: 0 | Status: SHIPPED | OUTPATIENT
Start: 2023-11-26 | End: 2023-11-27 | Stop reason: SDUPTHER

## 2023-11-27 ENCOUNTER — TELEPHONE (OUTPATIENT)
Dept: NEUROSURGERY | Facility: CLINIC | Age: 33
End: 2023-11-27
Payer: COMMERCIAL

## 2023-11-27 DIAGNOSIS — M54.16 LUMBAR RADICULOPATHY: ICD-10-CM

## 2023-11-27 DIAGNOSIS — M51.26 LUMBAR HERNIATED DISC: ICD-10-CM

## 2023-11-27 RX ORDER — HYDROCODONE BITARTRATE AND ACETAMINOPHEN 5; 325 MG/1; MG/1
1 TABLET ORAL EVERY 4 HOURS PRN
Qty: 28 TABLET | Refills: 0 | Status: SHIPPED | OUTPATIENT
Start: 2023-11-27

## 2023-11-27 RX ORDER — DIAZEPAM 5 MG/1
5 TABLET ORAL EVERY 8 HOURS PRN
Qty: 6 TABLET | Refills: 0 | Status: CANCELLED | OUTPATIENT
Start: 2023-11-27

## 2023-11-27 NOTE — TELEPHONE ENCOUNTER
Caller: Nelsy Bill    Relationship: Self    Best call back number: [phone]    Requested Prescriptions:   Requested Prescriptions     Pending Prescriptions Disp Refills    diazePAM (VALIUM) 5 MG tablet 6 tablet 0     Sig: Take 1 tablet by mouth Every 8 (Eight) Hours As Needed for Muscle Spasms.        Pharmacy where request should be sent: [address] [phone] [facility] [phone] [facility]     Last office visit with prescribing clinician: Visit date not found   Last telemedicine visit with prescribing clinician: Visit date not found   Next office visit with prescribing clinician: Visit date not found     Additional details provided by patient: PATIENT IS ALSO REQUESTING HYDROCODONE 5-325 MG (UNABLE TO ORDER). SENT REQUEST VIA [facility] ON 11/23/23 FOR BOTH MEDICATIONS AND WAS EVENTUALLY ABLE TO REACH PCP FOR HYDROCODONE REFILL YESTERDAY, BUT WAS ADVISED NEUROSURGERY OFFICE SHOULD BE MANAGING PAIN.    Does the patient have less than a 3 day supply:  [x] Yes  [] No    Would you like a call back once the refill request has been completed: [x] Yes [] No    If the office needs to give you a call back, can they leave a voicemail: [x] Yes [] No    Cameron Ibarra Rep   11/27/23 09:49 EST

## 2023-11-27 NOTE — TELEPHONE ENCOUNTER
Provider:  Carrington  Surgery/Procedure:  Lumbar discectomy L4-5  Surgery/Procedure Date:  11/15/23  Last visit:   11/14/23  Next visit: 11/30/23     Reason for call:  Updated patient that she should be on over the counter pain medications at this point. She is not having spasms so Valium would not be appropriate. She reports she is still in a lot of pain, some at the surgical site as well as some radiating into the left hip. I explained that some residual nerve pain is expected and there may be some inflammation still around the nerves, and again recommended she try ibuprofen for the anti-inflammatory property.

## 2023-11-30 ENCOUNTER — OFFICE VISIT (OUTPATIENT)
Dept: NEUROSURGERY | Facility: CLINIC | Age: 33
End: 2023-11-30
Payer: COMMERCIAL

## 2023-11-30 VITALS
TEMPERATURE: 97.1 F | BODY MASS INDEX: 35 KG/M2 | HEIGHT: 66 IN | DIASTOLIC BLOOD PRESSURE: 78 MMHG | WEIGHT: 217.8 LBS | SYSTOLIC BLOOD PRESSURE: 120 MMHG

## 2023-11-30 DIAGNOSIS — M54.16 LUMBAR RADICULOPATHY: Primary | ICD-10-CM

## 2023-11-30 DIAGNOSIS — M51.26 HNP (HERNIATED NUCLEUS PULPOSUS), LUMBAR: Primary | ICD-10-CM

## 2023-11-30 PROCEDURE — 99024 POSTOP FOLLOW-UP VISIT: CPT

## 2023-11-30 RX ORDER — METHYLPREDNISOLONE 4 MG/1
TABLET ORAL
Qty: 21 TABLET | Refills: 0 | Status: SHIPPED | OUTPATIENT
Start: 2023-11-30

## 2023-11-30 RX ORDER — GABAPENTIN 600 MG/1
600 TABLET ORAL 3 TIMES DAILY
Qty: 90 TABLET | Refills: 0 | Status: SHIPPED | OUTPATIENT
Start: 2023-11-30

## 2023-11-30 NOTE — PROGRESS NOTES
"Nelsy Bill  1990 11/30/2023  [id number]    CC: s/p right L4-5 microdiscectomy    HPI: Nelsy Bill is a 32 y.o. female who is 2 weeks s/p right L4-5 microdiscectomy with Dr. Shultz.  Patient was having 10 out of 10 constant right leg pain prior to surgery.  Immediately postoperatively, patient was doing well and noted significant improvement in her right leg pain.  However over the last couple of weeks, she states that her pain has become worse.  She states that her right leg pain is now a 7/10 and is worse with sitting and standing.  She is taking Norco 5 mg every 4 hours as needed.  She also takes gabapentin 400 mg 3 times daily.    Past Medical History:   Diagnosis Date    Brain concussion 2009    Soccer related    Headache 2010    Chronic migraines that went away with medication    HTN in pregnancy, chronic 09/22/2021    Lumbosacral disc disease     Patient denies medical problems     Positive testing for group B Streptococcus 05/07/2018    Preeclampsia 2010    Varicella 2000       Allergies   Allergen Reactions    Mastisol [Wound Dressing Adhesive] Rash     \"a terrible fire burning, blistered rash\"    Other Rash     Steri-strips         Current Outpatient Medications:     diclofenac (VOLTAREN) 75 MG EC tablet, Take 1 tablet by mouth 2 (Two) Times a Day., Disp: 60 tablet, Rfl: 1    HYDROcodone-acetaminophen (NORCO) 5-325 MG per tablet, Take 1 tablet by mouth Every 4 (Four) Hours As Needed for Moderate Pain., Disp: 28 tablet, Rfl: 0    loratadine (CLARITIN) 10 MG tablet, Take 1 tablet by mouth Daily., Disp: 30 tablet, Rfl: 0    methocarbamol (ROBAXIN) 750 MG tablet, 1 tablet., Disp: , Rfl:     phentermine (Adipex-P) 37.5 MG tablet, Take 1 tablet by mouth Every Morning Before Breakfast., Disp: 30 tablet, Rfl: 2    sennosides-docusate (senna-docusate sodium) 8.6-50 MG per tablet, Take 1 tablet by mouth Daily. Take while using hydrocodone to prevent constipation., Disp: 30 tablet, Rfl: " 0    tiZANidine (Zanaflex) 4 MG tablet, Take 1 tablet by mouth At Night As Needed for Muscle Spasms., Disp: 30 tablet, Rfl: 1    gabapentin (NEURONTIN) 600 MG tablet, Take 1 tablet by mouth 3 (Three) Times a Day., Disp: 90 tablet, Rfl: 0    methylPREDNISolone (MEDROL) 4 MG dose pack, Take as directed on package instructions., Disp: 21 tablet, Rfl: 0    Review of Systems   Constitutional:  Negative for activity change, appetite change, chills, diaphoresis, fatigue, fever and unexpected weight change.   HENT:  Negative for congestion, dental problem, drooling, ear discharge, ear pain, facial swelling, hearing loss, mouth sores, nosebleeds, postnasal drip, rhinorrhea, sinus pressure, sinus pain, sneezing, sore throat, tinnitus, trouble swallowing and voice change.    Eyes:  Negative for photophobia, pain, discharge, redness, itching and visual disturbance.   Respiratory:  Negative for apnea, cough, choking, chest tightness, shortness of breath, wheezing and stridor.    Cardiovascular:  Negative for chest pain, palpitations and leg swelling.   Gastrointestinal:  Negative for abdominal distention, abdominal pain, anal bleeding, blood in stool, constipation, diarrhea, nausea, rectal pain and vomiting.   Endocrine: Negative for cold intolerance, heat intolerance, polydipsia, polyphagia and polyuria.   Genitourinary:  Negative for decreased urine volume, difficulty urinating, dysuria, enuresis, flank pain, frequency, genital sores, hematuria and urgency.   Musculoskeletal:  Positive for back pain. Negative for arthralgias, gait problem, joint swelling, myalgias, neck pain and neck stiffness.   Skin:  Negative for color change, pallor, rash and wound.   Allergic/Immunologic: Negative for environmental allergies, food allergies and immunocompromised state.   Neurological:  Positive for numbness. Negative for dizziness, tremors, seizures, syncope, facial asymmetry, speech difficulty, weakness, light-headedness and headaches.  "  Hematological:  Negative for adenopathy. Does not bruise/bleed easily.   Psychiatric/Behavioral:  Negative for agitation, behavioral problems, confusion, decreased concentration, dysphoric mood, hallucinations, self-injury, sleep disturbance and suicidal ideas. The patient is not nervous/anxious and is not hyperactive.          PE:  /78 (BP Location: Left arm, Patient Position: Sitting, Cuff Size: Adult)   Temp 97.1 °F (36.2 °C) (Infrared)   Ht 167.6 cm (66\")   Wt 98.8 kg (217 lb 12.8 oz)   LMP 11/01/2023 (Approximate)   BMI 35.15 kg/m²     Neurologic Exam   Motor function intact 5/5 in bilateral lower extremities  Incision healing well    Social History    Tobacco Use      Smoking status: Never      Smokeless tobacco: Never       Tobacco Use: Low Risk  (11/30/2023)    Patient History     Smoking Tobacco Use: Never     Smokeless Tobacco Use: Never     Passive Exposure: Never         STEADI Fall Risk Assessment has not been completed.      Diagnoses and all orders for this visit:    1. HNP (herniated nucleus pulposus), lumbar (Primary)    Other orders  -     methylPREDNISolone (MEDROL) 4 MG dose pack; Take as directed on package instructions.  Dispense: 21 tablet; Refill: 0       Assessment/Plan  This is a 32 y.o. female who is 2 weeks s/p right L4-5 discectomy with Dr. Shultz.  Patient reports improvement of her right leg pain however still rates it a 7/10.  With the patient still in acute pain, we will start her on a Medrol Dosepak to hopefully reduce any inflammation.  Will also increase her gabapentin dose to 600 mg 3 times daily.  Will have her follow-up in 2 weeks to see how she is doing at that point.  If she continues to have pain, may warrant an MRI lumbar spine with and without contrast in the future. Activities and restrictions were discussed. Wound care was discussed with the patient. Patient encouraged to contact us if she has any changes in her condition or any concerns.    Any copied " data from previous notes included in the (1) HPI, (2) PE, (3) MDM and/or Assessment and Plan has been reviewed and is accurate as of 11/30/23    Leighann Castillo PA-C  11/30/23

## 2023-12-06 ENCOUNTER — TELEPHONE (OUTPATIENT)
Dept: NEUROSURGERY | Facility: CLINIC | Age: 33
End: 2023-12-06
Payer: COMMERCIAL

## 2023-12-06 DIAGNOSIS — M51.26 HNP (HERNIATED NUCLEUS PULPOSUS), LUMBAR: Primary | ICD-10-CM

## 2023-12-06 DIAGNOSIS — M51.26 LUMBAR HERNIATED DISC: ICD-10-CM

## 2023-12-06 NOTE — TELEPHONE ENCOUNTER
Provider:  Kyleigh  Surgery/Procedure:  Lumbar Discectomy L4-5  Surgery/Procedure Date:  11/15/23  Last visit:   11/30/23  Next visit: 12/14/23     Reason for call:  Ms. Bill reports she has taken the last dose of steroid this morning, and she has not had any improvement for her back. She is mostly only comfortable when laying down or in the recliner with ice on her back, when sitting or standing she has shooting pain from her back to her right foot. She would like to see if we could order a new MRI of her lumbar spine instead of waiting to her follow up? She also wanted to inquire about [place] paperwork that was filled out - she states that her time off was filled out as approximately 6 weeks, but that it was discussed on 11/30/23 to be the full 12 weeks.

## 2023-12-07 DIAGNOSIS — M54.41 ACUTE RIGHT-SIDED LOW BACK PAIN WITH RIGHT-SIDED SCIATICA: ICD-10-CM

## 2023-12-07 RX ORDER — HYDROCODONE BITARTRATE AND ACETAMINOPHEN 5; 325 MG/1; MG/1
1 TABLET ORAL EVERY 4 HOURS PRN
Qty: 28 TABLET | Refills: 0 | Status: SHIPPED | OUTPATIENT
Start: 2023-12-07

## 2023-12-07 RX ORDER — METHOCARBAMOL 750 MG/1
750 TABLET, FILM COATED ORAL
OUTPATIENT
Start: 2023-12-07

## 2023-12-07 RX ORDER — TIZANIDINE 4 MG/1
4 TABLET ORAL NIGHTLY PRN
Qty: 30 TABLET | Refills: 1 | OUTPATIENT
Start: 2023-12-07

## 2023-12-07 NOTE — TELEPHONE ENCOUNTER
Provider:  [facility]  Surgery/Procedure:  Lumbar discectomy L4-5  Surgery/Procedure Date:  11/15/23  Last visit:   11/30/23  Next visit: 12/14/23     Reason for call:  Refill request for Norco 5-325mg. Spoke to patient and documented a telephone encounter yesterday - Telephone with Medhat Shultz MD (12/06/2023). She is continuing to have shooting pain from her back down the right leg and foot, completed steroid pack without any improvement.     Agustin:    Pat ID Date Filled RX # Drug Name Prescriber Name Dispenser Name Qty Days MED PMT Rpt To  1 11/30/2023 [id number] Gabapentin 600MG Medhat Shultz [facility]   PHARMACY  90 30 04 [place]  Date Written New/Refill Dosage Form Prescriber [facility]  11/30/2023 New TABS Acme Mathias  Pat ID Date Filled RX # Drug Name Prescriber Name Dispenser Name Qty Days MED PMT Rpt To  1 11/27/2023 [id number] Hydrocodone Bitartrate/Ac 325MG/5MG *   Overlap *  Medhat Shultz [facility]   PHARMACY  28 [phone] [place]  Date Written New/Refill Dosage Form Prescriber [facility]  11/27/2023 New TABS Acme Mathias  Pat ID Date Filled RX # Drug Name Prescriber Name Dispenser Name Qty Days MED PMT Rpt To  2 11/26/2023 [id number] Diazepam 5MG * Overlap * Africa Lowe [facility] PHARMACY   [id number]  [address] 2 04 [place]  Date Written New/Refill Dosage Form Prescriber [facility]  11/26/2023 New QUAN Mathias  Pat ID Date Filled RX # Drug Name Prescriber Name Dispenser Name Qty Days MED PMT Rpt To  2 11/26/2023 [id number] Hydrocodone Bitartrate/Ac 325MG/5MG *   Overlap *  Africa Lowe [facility] PHARMACY   [id number]  8 2 20 04 [place]  Date Written New/Refill Dosage Form Prescriber [facility]  11/26/2023 New QUAN Mathias  Pat ID Date Filled RX # Drug Name Prescriber Name Dispenser Name Qty Days MED PMT Rpt To  1 11/17/2023 [id number] Gabapentin 100MG Africa Lowe [facility]   PHARMACY  60 10  04 [place]  Date Written New/Refill Dosage Form Prescriber [facility]  10/30/2023 Refill CAPS Valdez Mathias  Pat ID Date Filled RX # Drug Name Prescriber Name Dispenser Name Qty Days MED PMT Rpt To  1 11/16/2023 [id number] Diazepam 5MG * [facility]  20 7 04 [place]  Date Written New/Refill Dosage Form Prescriber [facility]  11/16/2023 [facility]  Pat ID Date Filled RX # Drug Name Prescriber Name Dispenser Name Qty Days MED PMT Rpt To  1 11/16/2023 [id number] Hydrocodone Bitartrate/Ac 325MG/5MG *   Overlap *  [facility]  30 5 30 04 [place]  Date Written New/Refill Dosage Form Prescriber [facility]  11/16/2023 [facility]

## 2023-12-08 DIAGNOSIS — M54.41 ACUTE RIGHT-SIDED LOW BACK PAIN WITH RIGHT-SIDED SCIATICA: ICD-10-CM

## 2023-12-08 RX ORDER — TIZANIDINE 4 MG/1
4 TABLET ORAL NIGHTLY PRN
Qty: 30 TABLET | Refills: 1 | Status: SHIPPED | OUTPATIENT
Start: 2023-12-08

## 2023-12-08 RX ORDER — METHOCARBAMOL 750 MG/1
TABLET, FILM COATED ORAL
Qty: 60 TABLET | Refills: 1 | Status: SHIPPED | OUTPATIENT
Start: 2023-12-08

## 2023-12-13 ENCOUNTER — HOSPITAL ENCOUNTER (OUTPATIENT)
Dept: MRI IMAGING | Facility: HOSPITAL | Age: 33
Discharge: HOME | End: 2023-12-13
Payer: COMMERCIAL

## 2023-12-13 DIAGNOSIS — M51.26 HNP (HERNIATED NUCLEUS PULPOSUS), LUMBAR: ICD-10-CM

## 2023-12-13 PROCEDURE — 0 GADOBENATE DIMEGLUMINE 529 MG/ML SOLUTION

## 2023-12-13 PROCEDURE — A9577 INJ MULTIHANCE: HCPCS

## 2023-12-13 PROCEDURE — 72158 MRI LUMBAR SPINE W/O & W/DYE: CPT

## 2023-12-13 RX ADMIN — GADOBENATE DIMEGLUMINE 19 ML: 529 INJECTION, SOLUTION INTRAVENOUS at 10:09

## 2023-12-14 ENCOUNTER — PREP FOR SURGERY (OUTPATIENT)
Dept: OTHER | Facility: HOSPITAL | Age: 33
End: 2023-12-14
Payer: COMMERCIAL

## 2023-12-14 ENCOUNTER — OFFICE VISIT (OUTPATIENT)
Dept: NEUROSURGERY | Facility: CLINIC | Age: 33
End: 2023-12-14
Payer: COMMERCIAL

## 2023-12-14 ENCOUNTER — LAB (OUTPATIENT)
Dept: LAB | Facility: HOSPITAL | Age: 33
End: 2023-12-14
Payer: COMMERCIAL

## 2023-12-14 ENCOUNTER — DOCUMENTATION (OUTPATIENT)
Dept: NEUROSURGERY | Facility: CLINIC | Age: 33
End: 2023-12-14
Payer: COMMERCIAL

## 2023-12-14 VITALS — HEIGHT: 66 IN | TEMPERATURE: 98.2 F | WEIGHT: 218 LBS | BODY MASS INDEX: 35.03 KG/M2

## 2023-12-14 DIAGNOSIS — M51.26 HNP (HERNIATED NUCLEUS PULPOSUS), LUMBAR: Primary | ICD-10-CM

## 2023-12-14 DIAGNOSIS — M51.26 HNP (HERNIATED NUCLEUS PULPOSUS), LUMBAR: ICD-10-CM

## 2023-12-14 DIAGNOSIS — M51.26 LUMBAR HERNIATED DISC: Primary | ICD-10-CM

## 2023-12-14 LAB
BASOPHILS # BLD AUTO: 0.03 10*3/MM3 (ref 0–0.2)
BASOPHILS NFR BLD AUTO: 0.3 % (ref 0–1.5)
CRP SERPL-MCNC: 0.71 MG/DL (ref 0–0.5)
DEPRECATED RDW RBC AUTO: 51.7 FL (ref 37–54)
EOSINOPHIL # BLD AUTO: 0.32 10*3/MM3 (ref 0–0.4)
EOSINOPHIL NFR BLD AUTO: 3.7 % (ref 0.3–6.2)
ERYTHROCYTE [DISTWIDTH] IN BLOOD BY AUTOMATED COUNT: 15 % (ref 12.3–15.4)
ERYTHROCYTE [SEDIMENTATION RATE] IN BLOOD: 12 MM/HR (ref 0–20)
HCT VFR BLD AUTO: 42.1 % (ref 34–46.6)
HGB BLD-MCNC: 14.1 G/DL (ref 12–15.9)
IMM GRANULOCYTES # BLD AUTO: 0.03 10*3/MM3 (ref 0–0.05)
IMM GRANULOCYTES NFR BLD AUTO: 0.3 % (ref 0–0.5)
LYMPHOCYTES # BLD AUTO: 1.85 10*3/MM3 (ref 0.7–3.1)
LYMPHOCYTES NFR BLD AUTO: 21.1 % (ref 19.6–45.3)
MCH RBC QN AUTO: 31.5 PG (ref 26.6–33)
MCHC RBC AUTO-ENTMCNC: 33.5 G/DL (ref 31.5–35.7)
MCV RBC AUTO: 94 FL (ref 79–97)
MONOCYTES # BLD AUTO: 0.6 10*3/MM3 (ref 0.1–0.9)
MONOCYTES NFR BLD AUTO: 6.8 % (ref 5–12)
NEUTROPHILS NFR BLD AUTO: 5.93 10*3/MM3 (ref 1.7–7)
NEUTROPHILS NFR BLD AUTO: 67.8 % (ref 42.7–76)
NRBC BLD AUTO-RTO: 0 /100 WBC (ref 0–0.2)
PLATELET # BLD AUTO: 341 10*3/MM3 (ref 140–450)
PMV BLD AUTO: 9 FL (ref 6–12)
RBC # BLD AUTO: 4.48 10*6/MM3 (ref 3.77–5.28)
WBC NRBC COR # BLD AUTO: 8.76 10*3/MM3 (ref 3.4–10.8)

## 2023-12-14 PROCEDURE — 86140 C-REACTIVE PROTEIN: CPT

## 2023-12-14 PROCEDURE — 85652 RBC SED RATE AUTOMATED: CPT

## 2023-12-14 PROCEDURE — 36415 COLL VENOUS BLD VENIPUNCTURE: CPT

## 2023-12-14 PROCEDURE — 99024 POSTOP FOLLOW-UP VISIT: CPT

## 2023-12-14 PROCEDURE — 85025 COMPLETE CBC W/AUTO DIFF WBC: CPT

## 2023-12-14 RX ORDER — CHLORHEXIDINE GLUCONATE 4 G/100ML
SOLUTION TOPICAL
Qty: 120 ML | Refills: 0 | Status: SHIPPED | OUTPATIENT
Start: 2023-12-14

## 2023-12-14 RX ORDER — OXYCODONE HYDROCHLORIDE AND ACETAMINOPHEN 5; 325 MG/1; MG/1
1 TABLET ORAL EVERY 4 HOURS PRN
Qty: 28 TABLET | Refills: 0 | Status: SHIPPED | OUTPATIENT
Start: 2023-12-14

## 2023-12-14 RX ORDER — FAMOTIDINE 20 MG/1
20 TABLET, FILM COATED ORAL
OUTPATIENT
Start: 2023-12-14

## 2023-12-14 NOTE — PROGRESS NOTES
"Nelsy Bill  1990 12/14/2023  [id number]    CC: s/p right L4-5 microdiscectomy    HPI: Nelsy Bill is a 32 y.o. female who is 4 weeks s/p right L4-5 microdiscectomy with Dr. Shultz. Preoperatively, patient was experiencing severe right leg pain.  Immediately postoperatively, while she was in the hospital, she had relief of her right leg pain however when she got home her pain returned.  She reports pain and numbness in the same distribution as preoperatively and it is quite severe. She is unable to work or complete ADLs due to her severe pain and her quality of life is greatly affected. She takes Norco and tizanidine for pain however her pain is uncontrolled.  We obtained new MRI lumbar spine which she brings in for review today.      Past Medical History:   Diagnosis Date    Abnormal ECG 11/22/2023    At [facility]    Brain concussion 2009    Soccer related    Headache 2010    Chronic migraines that went away with medication    HTN in pregnancy, chronic 09/22/2021    Low back pain September 2023    Pain radiating from my lower back down my entire right leg    Lumbosacral disc disease     Patient denies medical problems     Positive testing for group B Streptococcus 05/07/2018    Preeclampsia 2010    Varicella 2000       Allergies   Allergen Reactions    Mastisol [Wound Dressing Adhesive] Rash     \"a terrible fire burning, blistered rash\"    Other Rash     Steri-strips         Current Outpatient Medications:     gabapentin (NEURONTIN) 600 MG tablet, Take 1 tablet by mouth 3 (Three) Times a Day., Disp: 90 tablet, Rfl: 0    loratadine (CLARITIN) 10 MG tablet, Take 1 tablet by mouth Daily., Disp: 30 tablet, Rfl: 0    methocarbamol (ROBAXIN) 750 MG tablet, Take 1 po BID in the AM and afternoon, Disp: 60 tablet, Rfl: 1    phentermine (Adipex-P) 37.5 MG tablet, Take 1 tablet by mouth Every Morning Before Breakfast., Disp: 30 tablet, Rfl: 2    sennosides-docusate " "(senna-docusate sodium) 8.6-50 MG per tablet, Take 1 tablet by mouth Daily. Take while using hydrocodone to prevent constipation., Disp: 30 tablet, Rfl: 0    tiZANidine (Zanaflex) 4 MG tablet, Take 1 tablet by mouth At Night As Needed for Muscle Spasms., Disp: 30 tablet, Rfl: 1    chlorhexidine (HIBICLENS) 4 % external liquid, Shower each day with solution for 5 days beginning 5 days before surgery., Disp: 120 mL, Rfl: 0    Review of Systems   Musculoskeletal:  Positive for back pain and myalgias.   Neurological:  Positive for numbness.   Psychiatric/Behavioral:  Positive for sleep disturbance.          PE:  Temp 98.2 °F (36.8 °C) (Infrared)   Ht 167.6 cm (66\")   Wt 98.9 kg (218 lb)   LMP 11/01/2023 (Approximate)   BMI 35.19 kg/m²     Neurologic Exam   Incision shows no signs of active infection, drainage, redness, swelling.    Social History    Tobacco Use      Smoking status: Never      Smokeless tobacco: Never       Tobacco Use: Low Risk  (12/14/2023)    Patient History     Smoking Tobacco Use: Never     Smokeless Tobacco Use: Never     Passive Exposure: Never       STEADI Fall Risk Assessment has not been completed.      Diagnoses and all orders for this visit:    1. HNP (herniated nucleus pulposus), lumbar (Primary)  -     C-reactive Protein; Future  -     Sedimentation Rate; Future  -     CBC & Differential; Future       Assessment/Plan  This is a 32 y.o. female who is 4 weeks s/p right L4-5 microdiscectomy with Dr. Shultz.  MRI lumbar spine with and without contrast shows further disc at L4-5 has herniated causing severe compression on the nerve root.  There is also evidence of fluid collection in the prevertebral tissues however patient has no signs or symptoms of infection or CSF leak.  We will obtain inflammatory labs today to rule out any infection.  Due to severe uncontrolled pain and her current quality of life, Dr. Shultz discussed repeat right L4-5 discectomy with the patient.  He discussed " the benefits as well as risks that are associated with redo discectomy which include but are not limited to bleeding, infection, CSF, nerve root injury causing increased pain or weakness, continued pain postoperatively.  Patient understands the risks and would like to move forward surgery.  In the meantime, we will increase her pain medications to Percocet 5 mg every 4hrs as needed and she will continue taking gabapentin and tizanidine.  Will get her scheduled in the near future. Patient encouraged to contact us if she has any changes in her condition or any concerns.    Any copied data from previous notes included in the (1) HPI, (2) PE, (3) MDM and/or Assessment and Plan has been reviewed and is accurate as of 12/14/23    Leighann Castillo PA-C  12/14/23

## 2023-12-14 NOTE — PROGRESS NOTES
This is a 32-year-old female who underwent an L4-5 discectomy on the right 4 weeks ago.  Initially after surgery, the patient had complete resolution of her leg pain.  She then subsequently developed worsening and new pain that radiate down the right lower extremity in the same distribution as it had prior to surgery.  This pain has become quite severe to the point that she is unable to ambulate any significant distances on the leg.  Additionally, she is unable to perform daily activities including taking care of her children.  She is taking narcotics and gabapentin.  Additionally, she has had steroids, but continues to have significant pain.  She underwent a new lumbar MRI which shows a new disc herniation at L4-5 causing severe compression of the right traversing nerve root.  Due to the medically refractory nature of her pain and the fact this has been quite disabling, I think the patient requires an L4-5 redo microlumbar discectomy on the right.  I reviewed the risks, benefits and alternatives with the patient including but not limited to bleeding, infection, CSF leak, nerve injury, leg weakness and continued postoperative leg pain.  The patient understood these and has agreed to proceed with surgery.  We will look to get her scheduled in the near future.

## 2023-12-15 ENCOUNTER — PRE-ADMISSION TESTING (OUTPATIENT)
Dept: PREADMISSION TESTING | Facility: HOSPITAL | Age: 33
End: 2023-12-15
Payer: COMMERCIAL

## 2023-12-15 VITALS — BODY MASS INDEX: 35.54 KG/M2 | WEIGHT: 221.12 LBS | HEIGHT: 66 IN

## 2023-12-15 PROCEDURE — 87081 CULTURE SCREEN ONLY: CPT

## 2023-12-15 NOTE — PAT
Patient viewed general PAT education video as instructed in their preoperative information received from their surgeon.  Patient stated the general PAT education video was viewed in its entirety and survey completed.  Copies of PAT general education handouts (Incentive Spirometry, Meds to Beds Program, Patient Belongings, Pre-op skin preparation instructions, Blood Glucose testing, Visitor policy, Surgery FAQ, Code H) distributed to patient if not printed. Education related to the PAT pass and skin preparation for surgery (if applicable) completed in PAT as a reinforcement to PAT education video. Patient instructed to return PAT pass provided today as well as completed skin preparation sheet (if applicable) on the day of procedure.     Additionally if patient had not viewed video yet but intended to view it at home or in our waiting area, then referred them to the handout with QR code/link provided during PAT visit.  Instructed patient to complete survey after viewing the video in its entirety.  Encouraged patient/family to read [facility] general education handouts thoroughly and notify PAT staff with any questions or concerns. Patient verbalized understanding of all information and priority content.    Bactroban (if prescribed) and Chlorhexidine Prescription prescribed by physician before PAT visit.  Verified with patient that medication(s) were picked up from their pharmacy.  Written instructions given to patient during PAT visit.  Patient/family also instructed to complete skin prep checklist and return the checklist on the day of surgery to preoperative staff.  Patient/family verbalized understanding.    Patient to apply Chlorhexadine wipes  to surgical area (as instructed) the night before procedure and the AM of procedure. Wipes provided.    Patient instructed to drink 20 ounces of Gatorade or Gatorlyte (if diabetic) and it needs to be completed 1 hour (for Main OR patients) or 2 hours (scheduled  section &  [facility] patients) before given arrival time for procedure (NO RED Gatorade and NO Gatorade Zero).    Patient verbalized understanding.    EKG from ED visit for pain Nov. 2023 with heart rate 122.  Pulse 82 today.  No need to repeat EKG per Dr. Willis.

## 2023-12-16 LAB — MRSA SPEC QL CULT: NORMAL

## 2023-12-19 ENCOUNTER — ANESTHESIA EVENT (OUTPATIENT)
Dept: PERIOP | Facility: HOSPITAL | Age: 33
End: 2023-12-19
Payer: COMMERCIAL

## 2023-12-19 RX ORDER — SODIUM CHLORIDE 0.9 % (FLUSH) 0.9 %
10 SYRINGE (ML) INJECTION AS NEEDED
Status: CANCELLED | OUTPATIENT
Start: 2023-12-19

## 2023-12-19 RX ORDER — SODIUM CHLORIDE 9 MG/ML
40 INJECTION, SOLUTION INTRAVENOUS AS NEEDED
Status: CANCELLED | OUTPATIENT
Start: 2023-12-19

## 2023-12-19 RX ORDER — SODIUM CHLORIDE 0.9 % (FLUSH) 0.9 %
10 SYRINGE (ML) INJECTION EVERY 12 HOURS SCHEDULED
Status: CANCELLED | OUTPATIENT
Start: 2023-12-19

## 2023-12-19 RX ORDER — FAMOTIDINE 10 MG/ML
20 INJECTION, SOLUTION INTRAVENOUS ONCE
Status: CANCELLED | OUTPATIENT
Start: 2023-12-19 | End: 2023-12-19

## 2023-12-20 ENCOUNTER — ANESTHESIA (OUTPATIENT)
Dept: PERIOP | Facility: HOSPITAL | Age: 33
End: 2023-12-20
Payer: COMMERCIAL

## 2023-12-20 ENCOUNTER — HOSPITAL ENCOUNTER (OUTPATIENT)
Facility: HOSPITAL | Age: 33
Discharge: HOME | End: 2023-12-22
Attending: STUDENT IN AN ORGANIZED HEALTH CARE EDUCATION/TRAINING PROGRAM | Admitting: STUDENT IN AN ORGANIZED HEALTH CARE EDUCATION/TRAINING PROGRAM
Payer: COMMERCIAL

## 2023-12-20 ENCOUNTER — APPOINTMENT (OUTPATIENT)
Dept: GENERAL RADIOLOGY | Facility: HOSPITAL | Age: 33
End: 2023-12-20
Payer: COMMERCIAL

## 2023-12-20 DIAGNOSIS — M51.26 HNP (HERNIATED NUCLEUS PULPOSUS), LUMBAR: Primary | ICD-10-CM

## 2023-12-20 PROBLEM — M54.16 LUMBAR RADICULOPATHY: Status: ACTIVE | Noted: 2023-12-20

## 2023-12-20 LAB
B-HCG UR QL: NEGATIVE
EXPIRATION DATE: NORMAL
INTERNAL NEGATIVE CONTROL: NEGATIVE
INTERNAL POSITIVE CONTROL: POSITIVE
Lab: NORMAL

## 2023-12-20 PROCEDURE — 25810000003 SODIUM CHLORIDE PER 500 ML: Performed by: STUDENT IN AN ORGANIZED HEALTH CARE EDUCATION/TRAINING PROGRAM

## 2023-12-20 PROCEDURE — 81025 URINE PREGNANCY TEST: CPT | Performed by: ANESTHESIOLOGY

## 2023-12-20 PROCEDURE — 25010000002 CEFAZOLIN PER 500 MG

## 2023-12-20 PROCEDURE — 25010000002 ONDANSETRON PER 1 MG: Performed by: NURSE ANESTHETIST, CERTIFIED REGISTERED

## 2023-12-20 PROCEDURE — 25010000002 SUGAMMADEX 200 MG/2ML SOLUTION: Performed by: NURSE ANESTHETIST, CERTIFIED REGISTERED

## 2023-12-20 PROCEDURE — 25010000002 PROPOFOL 10 MG/ML EMULSION: Performed by: NURSE ANESTHETIST, CERTIFIED REGISTERED

## 2023-12-20 PROCEDURE — 25010000002 DIAZEPAM PER 5 MG

## 2023-12-20 PROCEDURE — 25810000003 LACTATED RINGERS PER 1000 ML: Performed by: NURSE ANESTHETIST, CERTIFIED REGISTERED

## 2023-12-20 PROCEDURE — 63030 LAMOT DCMPRN NRV RT 1 LMBR: CPT

## 2023-12-20 PROCEDURE — 25010000002 METHYLPREDNISOLONE PER 40 MG: Performed by: STUDENT IN AN ORGANIZED HEALTH CARE EDUCATION/TRAINING PROGRAM

## 2023-12-20 PROCEDURE — 25810000003 LACTATED RINGERS PER 1000 ML: Performed by: ANESTHESIOLOGY

## 2023-12-20 PROCEDURE — 63030 LAMOT DCMPRN NRV RT 1 LMBR: CPT | Performed by: STUDENT IN AN ORGANIZED HEALTH CARE EDUCATION/TRAINING PROGRAM

## 2023-12-20 PROCEDURE — 25010000002 DEXAMETHASONE PER 1 MG: Performed by: NURSE ANESTHETIST, CERTIFIED REGISTERED

## 2023-12-20 PROCEDURE — 25010000002 HYDROMORPHONE 1 MG/ML SOLUTION

## 2023-12-20 PROCEDURE — 25010000002 FENTANYL CITRATE (PF) 50 MCG/ML SOLUTION

## 2023-12-20 PROCEDURE — 25010000002 FENTANYL CITRATE (PF) 100 MCG/2ML SOLUTION: Performed by: NURSE ANESTHETIST, CERTIFIED REGISTERED

## 2023-12-20 PROCEDURE — 76000 FLUOROSCOPY <1 HR PHYS/QHP: CPT

## 2023-12-20 PROCEDURE — 25010000002 HYDROMORPHONE PER 4 MG: Performed by: STUDENT IN AN ORGANIZED HEALTH CARE EDUCATION/TRAINING PROGRAM

## 2023-12-20 RX ORDER — DEXAMETHASONE SODIUM PHOSPHATE 4 MG/ML
INJECTION, SOLUTION INTRA-ARTICULAR; INTRALESIONAL; INTRAMUSCULAR; INTRAVENOUS; SOFT TISSUE AS NEEDED
Status: DISCONTINUED | OUTPATIENT
Start: 2023-12-20 | End: 2023-12-20 | Stop reason: SURG

## 2023-12-20 RX ORDER — AMOXICILLIN 250 MG
1 CAPSULE ORAL NIGHTLY PRN
Status: DISCONTINUED | OUTPATIENT
Start: 2023-12-20 | End: 2023-12-22 | Stop reason: HOSPADM

## 2023-12-20 RX ORDER — DOCUSATE SODIUM 100 MG/1
100 CAPSULE, LIQUID FILLED ORAL 2 TIMES DAILY PRN
Status: DISCONTINUED | OUTPATIENT
Start: 2023-12-20 | End: 2023-12-22 | Stop reason: HOSPADM

## 2023-12-20 RX ORDER — FENTANYL CITRATE 50 UG/ML
INJECTION, SOLUTION INTRAMUSCULAR; INTRAVENOUS
Status: COMPLETED
Start: 2023-12-20 | End: 2023-12-20

## 2023-12-20 RX ORDER — SODIUM CHLORIDE, SODIUM LACTATE, POTASSIUM CHLORIDE, CALCIUM CHLORIDE 600; 310; 30; 20 MG/100ML; MG/100ML; MG/100ML; MG/100ML
9 INJECTION, SOLUTION INTRAVENOUS CONTINUOUS
Status: DISCONTINUED | OUTPATIENT
Start: 2023-12-20 | End: 2023-12-22 | Stop reason: HOSPADM

## 2023-12-20 RX ORDER — PROPOFOL 10 MG/ML
VIAL (ML) INTRAVENOUS AS NEEDED
Status: DISCONTINUED | OUTPATIENT
Start: 2023-12-20 | End: 2023-12-20 | Stop reason: SURG

## 2023-12-20 RX ORDER — SODIUM CHLORIDE 0.9 % (FLUSH) 0.9 %
3 SYRINGE (ML) INJECTION EVERY 12 HOURS SCHEDULED
Status: DISCONTINUED | OUTPATIENT
Start: 2023-12-20 | End: 2023-12-22 | Stop reason: HOSPADM

## 2023-12-20 RX ORDER — MIDAZOLAM HYDROCHLORIDE 1 MG/ML
1 INJECTION INTRAMUSCULAR; INTRAVENOUS
Status: DISCONTINUED | OUTPATIENT
Start: 2023-12-20 | End: 2023-12-20 | Stop reason: HOSPADM

## 2023-12-20 RX ORDER — LIDOCAINE HYDROCHLORIDE 10 MG/ML
0.5 INJECTION, SOLUTION EPIDURAL; INFILTRATION; INTRACAUDAL; PERINEURAL ONCE AS NEEDED
Status: COMPLETED | OUTPATIENT
Start: 2023-12-20 | End: 2023-12-20

## 2023-12-20 RX ORDER — ENOXAPARIN SODIUM 100 MG/ML
40 INJECTION SUBCUTANEOUS NIGHTLY
Status: DISCONTINUED | OUTPATIENT
Start: 2023-12-21 | End: 2023-12-22 | Stop reason: HOSPADM

## 2023-12-20 RX ORDER — POLYETHYLENE GLYCOL 3350 17 G/17G
17 POWDER, FOR SOLUTION ORAL DAILY PRN
Status: DISCONTINUED | OUTPATIENT
Start: 2023-12-20 | End: 2023-12-22 | Stop reason: HOSPADM

## 2023-12-20 RX ORDER — DIAZEPAM 5 MG/ML
INJECTION, SOLUTION INTRAMUSCULAR; INTRAVENOUS
Status: COMPLETED
Start: 2023-12-20 | End: 2023-12-20

## 2023-12-20 RX ORDER — LIDOCAINE HYDROCHLORIDE AND EPINEPHRINE 5; 5 MG/ML; UG/ML
INJECTION, SOLUTION INFILTRATION; PERINEURAL AS NEEDED
Status: DISCONTINUED | OUTPATIENT
Start: 2023-12-20 | End: 2023-12-20 | Stop reason: HOSPADM

## 2023-12-20 RX ORDER — FAMOTIDINE 20 MG/1
20 TABLET, FILM COATED ORAL ONCE
Status: COMPLETED | OUTPATIENT
Start: 2023-12-20 | End: 2023-12-20

## 2023-12-20 RX ORDER — OXYCODONE HYDROCHLORIDE AND ACETAMINOPHEN 5; 325 MG/1; MG/1
1 TABLET ORAL EVERY 4 HOURS PRN
Status: DISCONTINUED | OUTPATIENT
Start: 2023-12-20 | End: 2023-12-22

## 2023-12-20 RX ORDER — SODIUM CHLORIDE 9 MG/ML
40 INJECTION, SOLUTION INTRAVENOUS AS NEEDED
Status: DISCONTINUED | OUTPATIENT
Start: 2023-12-20 | End: 2023-12-22 | Stop reason: HOSPADM

## 2023-12-20 RX ORDER — FENTANYL CITRATE 50 UG/ML
50 INJECTION, SOLUTION INTRAMUSCULAR; INTRAVENOUS
Status: DISCONTINUED | OUTPATIENT
Start: 2023-12-20 | End: 2023-12-20 | Stop reason: HOSPADM

## 2023-12-20 RX ORDER — SODIUM CHLORIDE 0.9 % (FLUSH) 0.9 %
10 SYRINGE (ML) INJECTION AS NEEDED
Status: DISCONTINUED | OUTPATIENT
Start: 2023-12-20 | End: 2023-12-22 | Stop reason: HOSPADM

## 2023-12-20 RX ORDER — MAGNESIUM HYDROXIDE 1200 MG/15ML
LIQUID ORAL AS NEEDED
Status: DISCONTINUED | OUTPATIENT
Start: 2023-12-20 | End: 2023-12-20 | Stop reason: HOSPADM

## 2023-12-20 RX ORDER — METHOCARBAMOL 500 MG/1
500 TABLET, FILM COATED ORAL 4 TIMES DAILY
Status: DISCONTINUED | OUTPATIENT
Start: 2023-12-20 | End: 2023-12-21

## 2023-12-20 RX ORDER — ONDANSETRON 2 MG/ML
INJECTION INTRAMUSCULAR; INTRAVENOUS AS NEEDED
Status: DISCONTINUED | OUTPATIENT
Start: 2023-12-20 | End: 2023-12-20 | Stop reason: SURG

## 2023-12-20 RX ORDER — METHYLPREDNISOLONE ACETATE 40 MG/ML
INJECTION, SUSPENSION INTRA-ARTICULAR; INTRALESIONAL; INTRAMUSCULAR; SOFT TISSUE AS NEEDED
Status: DISCONTINUED | OUTPATIENT
Start: 2023-12-20 | End: 2023-12-20 | Stop reason: HOSPADM

## 2023-12-20 RX ORDER — ROCURONIUM BROMIDE 10 MG/ML
INJECTION, SOLUTION INTRAVENOUS AS NEEDED
Status: DISCONTINUED | OUTPATIENT
Start: 2023-12-20 | End: 2023-12-20 | Stop reason: SURG

## 2023-12-20 RX ORDER — AMOXICILLIN 250 MG
1 CAPSULE ORAL DAILY
Status: DISCONTINUED | OUTPATIENT
Start: 2023-12-20 | End: 2023-12-22 | Stop reason: HOSPADM

## 2023-12-20 RX ORDER — DIAZEPAM 5 MG/ML
5 INJECTION, SOLUTION INTRAMUSCULAR; INTRAVENOUS ONCE
Status: COMPLETED | OUTPATIENT
Start: 2023-12-20 | End: 2023-12-20

## 2023-12-20 RX ORDER — LIDOCAINE HYDROCHLORIDE 10 MG/ML
INJECTION, SOLUTION EPIDURAL; INFILTRATION; INTRACAUDAL; PERINEURAL AS NEEDED
Status: DISCONTINUED | OUTPATIENT
Start: 2023-12-20 | End: 2023-12-20 | Stop reason: SURG

## 2023-12-20 RX ORDER — CETIRIZINE HYDROCHLORIDE 10 MG/1
10 TABLET ORAL DAILY
Status: DISCONTINUED | OUTPATIENT
Start: 2023-12-20 | End: 2023-12-22 | Stop reason: HOSPADM

## 2023-12-20 RX ORDER — SODIUM CHLORIDE, SODIUM LACTATE, POTASSIUM CHLORIDE, CALCIUM CHLORIDE 600; 310; 30; 20 MG/100ML; MG/100ML; MG/100ML; MG/100ML
INJECTION, SOLUTION INTRAVENOUS CONTINUOUS PRN
Status: DISCONTINUED | OUTPATIENT
Start: 2023-12-20 | End: 2023-12-20 | Stop reason: SURG

## 2023-12-20 RX ORDER — FENTANYL CITRATE 50 UG/ML
INJECTION, SOLUTION INTRAMUSCULAR; INTRAVENOUS AS NEEDED
Status: DISCONTINUED | OUTPATIENT
Start: 2023-12-20 | End: 2023-12-20 | Stop reason: SURG

## 2023-12-20 RX ORDER — ONDANSETRON 2 MG/ML
4 INJECTION INTRAMUSCULAR; INTRAVENOUS EVERY 6 HOURS PRN
Status: DISCONTINUED | OUTPATIENT
Start: 2023-12-20 | End: 2023-12-22 | Stop reason: HOSPADM

## 2023-12-20 RX ORDER — SODIUM CHLORIDE 9 MG/ML
INJECTION, SOLUTION INTRAVENOUS AS NEEDED
Status: DISCONTINUED | OUTPATIENT
Start: 2023-12-20 | End: 2023-12-20 | Stop reason: HOSPADM

## 2023-12-20 RX ORDER — GABAPENTIN 300 MG/1
600 CAPSULE ORAL EVERY 8 HOURS SCHEDULED
Status: DISCONTINUED | OUTPATIENT
Start: 2023-12-20 | End: 2023-12-22 | Stop reason: HOSPADM

## 2023-12-20 RX ORDER — HYDROMORPHONE HYDROCHLORIDE 1 MG/ML
0.5 INJECTION, SOLUTION INTRAMUSCULAR; INTRAVENOUS; SUBCUTANEOUS
Status: COMPLETED | OUTPATIENT
Start: 2023-12-20 | End: 2023-12-20

## 2023-12-20 RX ORDER — HYDROMORPHONE HYDROCHLORIDE 1 MG/ML
0.5 INJECTION, SOLUTION INTRAMUSCULAR; INTRAVENOUS; SUBCUTANEOUS
Status: DISCONTINUED | OUTPATIENT
Start: 2023-12-20 | End: 2023-12-22

## 2023-12-20 RX ADMIN — FENTANYL CITRATE 100 MCG: 50 INJECTION, SOLUTION INTRAMUSCULAR; INTRAVENOUS at 09:26

## 2023-12-20 RX ADMIN — HYDROMORPHONE HYDROCHLORIDE 0.5 MG: 1 INJECTION, SOLUTION INTRAMUSCULAR; INTRAVENOUS; SUBCUTANEOUS at 11:41

## 2023-12-20 RX ADMIN — HYDROMORPHONE HYDROCHLORIDE 0.5 MG: 1 INJECTION, SOLUTION INTRAMUSCULAR; INTRAVENOUS; SUBCUTANEOUS at 12:06

## 2023-12-20 RX ADMIN — DIAZEPAM 5 MG: 5 INJECTION, SOLUTION INTRAMUSCULAR; INTRAVENOUS at 12:32

## 2023-12-20 RX ADMIN — OXYCODONE HYDROCHLORIDE AND ACETAMINOPHEN 1 TABLET: 5; 325 TABLET ORAL at 23:50

## 2023-12-20 RX ADMIN — LIDOCAINE HYDROCHLORIDE 0.5 ML: 10 INJECTION, SOLUTION EPIDURAL; INFILTRATION; INTRACAUDAL; PERINEURAL at 08:44

## 2023-12-20 RX ADMIN — OXYCODONE HYDROCHLORIDE AND ACETAMINOPHEN 1 TABLET: 5; 325 TABLET ORAL at 19:39

## 2023-12-20 RX ADMIN — HYDROMORPHONE HYDROCHLORIDE 0.5 MG: 1 INJECTION, SOLUTION INTRAMUSCULAR; INTRAVENOUS; SUBCUTANEOUS at 13:06

## 2023-12-20 RX ADMIN — METHOCARBAMOL 500 MG: 500 TABLET ORAL at 18:32

## 2023-12-20 RX ADMIN — ONDANSETRON 4 MG: 2 INJECTION INTRAMUSCULAR; INTRAVENOUS at 10:57

## 2023-12-20 RX ADMIN — Medication 3 ML: at 21:33

## 2023-12-20 RX ADMIN — DEXAMETHASONE SODIUM PHOSPHATE 8 MG: 4 INJECTION, SOLUTION INTRAMUSCULAR; INTRAVENOUS at 09:40

## 2023-12-20 RX ADMIN — SODIUM CHLORIDE 2000 MG: 900 INJECTION INTRAVENOUS at 09:32

## 2023-12-20 RX ADMIN — HYDROMORPHONE HYDROCHLORIDE 0.5 MG: 1 INJECTION, SOLUTION INTRAMUSCULAR; INTRAVENOUS; SUBCUTANEOUS at 11:49

## 2023-12-20 RX ADMIN — HYDROMORPHONE HYDROCHLORIDE 0.5 MG: 1 INJECTION, SOLUTION INTRAMUSCULAR; INTRAVENOUS; SUBCUTANEOUS at 16:18

## 2023-12-20 RX ADMIN — FENTANYL CITRATE 50 MCG: 50 INJECTION, SOLUTION INTRAMUSCULAR; INTRAVENOUS at 12:53

## 2023-12-20 RX ADMIN — METHOCARBAMOL 500 MG: 500 TABLET ORAL at 21:32

## 2023-12-20 RX ADMIN — FENTANYL CITRATE 50 MCG: 50 INJECTION, SOLUTION INTRAMUSCULAR; INTRAVENOUS at 11:59

## 2023-12-20 RX ADMIN — DIAZEPAM 5 MG: 10 INJECTION, SOLUTION INTRAMUSCULAR; INTRAVENOUS at 12:32

## 2023-12-20 RX ADMIN — SODIUM CHLORIDE, POTASSIUM CHLORIDE, SODIUM LACTATE AND CALCIUM CHLORIDE 9 ML/HR: 600; 310; 30; 20 INJECTION, SOLUTION INTRAVENOUS at 08:44

## 2023-12-20 RX ADMIN — ROCURONIUM BROMIDE 50 MG: 50 INJECTION INTRAVENOUS at 09:26

## 2023-12-20 RX ADMIN — PROPOFOL 25 MCG/KG/MIN: 10 INJECTION, EMULSION INTRAVENOUS at 09:38

## 2023-12-20 RX ADMIN — LIDOCAINE HYDROCHLORIDE 50 MG: 10 INJECTION, SOLUTION EPIDURAL; INFILTRATION; INTRACAUDAL; PERINEURAL at 09:26

## 2023-12-20 RX ADMIN — SENNOSIDES AND DOCUSATE SODIUM 1 TABLET: 8.6; 5 TABLET ORAL at 15:00

## 2023-12-20 RX ADMIN — GABAPENTIN 600 MG: 300 CAPSULE ORAL at 15:01

## 2023-12-20 RX ADMIN — PROPOFOL 300 MG: 10 INJECTION, EMULSION INTRAVENOUS at 09:26

## 2023-12-20 RX ADMIN — OXYCODONE HYDROCHLORIDE AND ACETAMINOPHEN 1 TABLET: 5; 325 TABLET ORAL at 15:00

## 2023-12-20 RX ADMIN — CETIRIZINE HYDROCHLORIDE 10 MG: 10 TABLET, FILM COATED ORAL at 14:00

## 2023-12-20 RX ADMIN — SODIUM CHLORIDE, POTASSIUM CHLORIDE, SODIUM LACTATE AND CALCIUM CHLORIDE: 600; 310; 30; 20 INJECTION, SOLUTION INTRAVENOUS at 09:22

## 2023-12-20 RX ADMIN — SUGAMMADEX 200 MG: 100 INJECTION, SOLUTION INTRAVENOUS at 10:57

## 2023-12-20 RX ADMIN — GABAPENTIN 600 MG: 300 CAPSULE ORAL at 21:32

## 2023-12-20 RX ADMIN — FAMOTIDINE 20 MG: 20 TABLET, FILM COATED ORAL at 08:45

## 2023-12-20 RX ADMIN — ROCURONIUM BROMIDE 20 MG: 50 INJECTION INTRAVENOUS at 10:04

## 2023-12-20 RX ADMIN — FENTANYL CITRATE 50 MCG: 50 INJECTION, SOLUTION INTRAMUSCULAR; INTRAVENOUS at 12:12

## 2023-12-20 RX ADMIN — HYDROMORPHONE HYDROCHLORIDE 0.5 MG: 1 INJECTION, SOLUTION INTRAMUSCULAR; INTRAVENOUS; SUBCUTANEOUS at 21:32

## 2023-12-20 NOTE — H&P
"  Pre-Op H&P  Nelsy Bill  [id number]  1990      Chief complaint: Back and Right Leg Pain      Subjective:  Patient is a 33 y.o.female presents for scheduled surgery by Dr. Shultz. She anticipates a REDO OPEN LUMBAR DISCECTOMY L4-5 - Right today.  This is the patient's second lumbar discectomy.  Her first surgery was around the middle of November, 2023.  Almost immediately after she left the hospital after her first surgery, she had numerous symptoms.  She endorsed having back pain, shooting pain down her right leg, numbness in her pelvic region, right hip pain, and right foot and calf tingling.  She has been taking oxycodone, which has helped some to alleviate the pain.  She also has been taking gabapentin, which has helped some with the nerve pain.    Review of Systems:  Constitutional-- No fever, chills or sweats. No fatigue.  CV-- No chest pain, palpitation or syncope  Resp-- No SOB, cough, hemoptysis  Skin--No rashes or lesions      Allergies:   Allergies   Allergen Reactions    Mastisol [Wound Dressing Adhesive] Rash     \"a terrible fire burning, blistered rash\"    Other Rash     Steri-strips         Home Meds:  Medications Prior to Admission   Medication Sig Dispense Refill Last Dose    chlorhexidine (HIBICLENS) 4 % external liquid Shower each day with solution for 5 days beginning 5 days before surgery. 120 mL 0 12/19/2023 at 2100    gabapentin (NEURONTIN) 600 MG tablet Take 1 tablet by mouth 3 (Three) Times a Day. 90 tablet 0 12/19/2023 at 2007    methocarbamol (ROBAXIN) 750 MG tablet Take 1 po BID in the AM and afternoon 60 tablet 1 12/19/2023 at 2007    oxyCODONE-acetaminophen (Percocet) 5-325 MG per tablet Take 1 tablet by mouth Every 4 (Four) Hours As Needed for Severe Pain or Moderate Pain. 28 tablet 0 12/19/2023 at 2220    tiZANidine (Zanaflex) 4 MG tablet Take 1 tablet by mouth At Night As Needed for Muscle Spasms. 30 tablet 1 12/19/2023 at 2220    loratadine (CLARITIN) 10 MG " "tablet Take 1 tablet by mouth Daily. (Patient taking differently: Take 1 tablet by mouth Daily As Needed.) 30 tablet 0 More than a month    sennosides-docusate (senna-docusate sodium) 8.6-50 MG per tablet Take 1 tablet by mouth Daily. Take while using hydrocodone to prevent constipation. 30 tablet 0 More than a month         PMH:   Past Medical History:   Diagnosis Date    Abnormal ECG 2023    Tachycardia    Brain concussion     Soccer related    HTN in pregnancy, chronic 2021    Lumbosacral disc disease     Positive testing for group B Streptococcus 2018    Preeclampsia 2010    Varicella      PSH:    Past Surgical History:   Procedure Laterality Date     SECTION  ,     x's 2     SECTION WITH TUBAL N/A 2018    Procedure:  SECTION REPEAT;  Surgeon: Swetha Hernandez MD;  Location: [facility] LABOR DELIVERY;  Service: Obstetrics/Gynecology     SECTION WITH TUBAL N/A 2022    Procedure:  SECTION REPEAT WITH TUBAL;  Surgeon: Swetha Hernandez MD;  Location: [facility] OR;  Service: Obstetrics/Gynecology;  Laterality: N/A;    KNEE ACL RECONSTRUCTION Left     LUMBAR DISCECTOMY Right 11/15/2023    Procedure: LUMBAR DISCECTOMY L4-5;  Surgeon: Medhat Shultz MD;  Location: [facility] OR;  Service: Neurosurgery;  Laterality: Right;    TUBAL ABDOMINAL LIGATION      WISDOM TOOTH EXTRACTION         Immunization History:  Influenza: No  Pneumococcal: No  Tetanus: Yes  Covid : x2    Social History:   Tobacco:   Social History     Tobacco Use   Smoking Status Never    Passive exposure: Never   Smokeless Tobacco Never      Alcohol:     Social History     Substance and Sexual Activity   Alcohol Use No         Physical Exam:/76 (BP Location: Right arm, Patient Position: Lying)   Pulse 84   Temp 97.5 °F (36.4 °C) (Temporal)   Resp 16   Ht 167.6 cm (66\")   Wt 100 kg (221 lb)   LMP 2023 (Approximate)   SpO2 97%   BMI 35.67 kg/m²       General " Appearance:    Alert, cooperative, no distress, appears stated age   Head:    Normocephalic, without obvious abnormality, atraumatic   Lungs:     Clear to auscultation bilaterally, respirations unlabored    Heart:   Regular rate and rhythm, S1 and S2 normal    Abdomen:    Soft without tenderness   Extremities:   Extremities normal, atraumatic, no cyanosis or edema   Skin:   Skin color, texture, turgor normal, no rashes or lesions   Neurologic:   Grossly intact     Results Review:     LABS:  Lab Results   Component Value Date    WBC 8.76 12/14/2023    HGB 14.1 12/14/2023    HCT 42.1 12/14/2023    MCV 94.0 12/14/2023     12/14/2023    NEUTROABS 5.93 12/14/2023    GLUCOSE 132 (H) 11/22/2023    BUN 11 11/22/2023    CREATININE 0.77 11/22/2023    EGFRIFNONA 105 07/09/2021    EGFRIFAFRI 127 07/09/2021     11/22/2023    K 4.0 11/22/2023     11/22/2023    CO2 24.0 11/22/2023    MG 2.4 11/15/2023    CALCIUM 9.3 11/22/2023    ALBUMIN 4.2 11/22/2023    AST 36 (H) 11/22/2023     (H) 11/22/2023    BILITOT 0.8 11/22/2023       RADIOLOGY:  Imaging Results (Last 72 Hours)       ** No results found for the last 72 hours. **            I reviewed the patient's new clinical results.      Impression:   HNP (herniated nucleus pulposus), lumbar       Plan: REDO OPEN LUMBAR DISCECTOMY L4-5 - Right       Frank Begum, APRKHAI   12/20/2023   09:04 EST

## 2023-12-20 NOTE — BRIEF OP NOTE
LUMBAR LAMINECTOMY DISCECTOMY DECOMPRESSION POSTERIOR 1-2 LEVELS  Progress Note    Nelsy Bill  12/20/2023    Pre-op Diagnosis:   HNP (herniated nucleus pulposus), lumbar [M51.26]       Post-Op Diagnosis Codes:     * HNP (herniated nucleus pulposus), lumbar [M51.26]    Procedure/CPT® Codes:        Procedure(s):  REDO OPEN LUMBAR DISCECTOMY L4-5              Surgeon(s):  Medhat Shultz MD    Anesthesia: General    Staff:   Circulator: Daniela Kaplan RN  Physician Assistant: Leighann Castillo PA-C  Radiology Technologist: Mitch Dickson RT  Scrub Person: Zunilda Reyes; Radhika Maharaj         Estimated Blood Loss: minimal    Urine Voided: * No values recorded between 12/20/2023  9:22 AM and 12/20/2023 10:51 AM *    Specimens:                None          Drains: * No LDAs found *    Findings: Several large pieces of disc fragment removed.  Circumferential decompression of the nerve root achieved.        Complications: None apparent          Medhat Shultz MD     Date: 12/20/2023  Time: 10:51 EST

## 2023-12-20 NOTE — ANESTHESIA PREPROCEDURE EVALUATION
Anesthesia Evaluation     Patient summary reviewed and Nursing notes reviewed   no history of anesthetic complications:   NPO Solid Status: > 8 hours  NPO Liquid Status: > 2 hours           Airway   Mallampati: II  TM distance: >3 FB  Neck ROM: full  No difficulty expected  Comment: Previous Grade 1 view with MAC 3 blade  Dental - normal exam     Pulmonary - negative pulmonary ROS and normal exam   Cardiovascular - normal exam    ECG reviewed    (+) hypertension  (-) dysrhythmias, angina, SAMAYOA      Neuro/Psych  (+) headaches  (-) seizures, CVA, no Parkinson's disease  GI/Hepatic/Renal/Endo    (+) obesity  (-) GERD, liver disease, no renal disease, diabetes, no thyroid disorder    Musculoskeletal     (+) back pain  Abdominal    Substance History      OB/GYN          Other                    Anesthesia Plan    ASA 2     general     intravenous induction     Anesthetic plan, risks, benefits, and alternatives have been provided, discussed and informed consent has been obtained with: patient.    Plan discussed with CRNA.    CODE STATUS:

## 2023-12-20 NOTE — ANESTHESIA POSTPROCEDURE EVALUATION
Patient: Nelsy Bill    Procedure Summary       Date: 12/20/23 Room / Location:  [facility]    Anesthesia Start: 0922 Anesthesia Stop: 1125    Procedure: REDO OPEN LUMBAR DISCECTOMY L4-5 (Right: Spine Lumbar) Diagnosis:       HNP (herniated nucleus pulposus), lumbar      (HNP (herniated nucleus pulposus), lumbar [M51.26])    Surgeons: Medhat Shultz MD Provider: Nadege Holland MD    Anesthesia Type: general ASA Status: 2            Anesthesia Type: general    Vitals  Vitals Value Taken Time   /80 12/20/23 1123   Temp     Pulse 92 12/20/23 1125   Resp     SpO2 98 % 12/20/23 1125   Vitals shown include unfiled device data.        Post Anesthesia Care and Evaluation    Patient location during evaluation: PACU  Patient participation: complete - patient participated  Level of consciousness: sleepy but conscious  Pain management: adequate    Airway patency: patent  Anesthetic complications: No anesthetic complications  PONV Status: none  Cardiovascular status: hemodynamically stable and acceptable  Respiratory status: nonlabored ventilation, acceptable and nasal cannula  Hydration status: acceptable

## 2023-12-20 NOTE — ANESTHESIA PROCEDURE NOTES
Airway  Urgency: elective    Date/Time: 12/20/2023 9:35 AM  Airway not difficult    General Information and Staff    Patient location during procedure: OR    Indications and Patient Condition  Indications for airway management: airway protection    Preoxygenated: yes  MILS not maintained throughout  Mask difficulty assessment: 1 - vent by mask    Final Airway Details  Final airway type: endotracheal airway      Successful airway: ETT  Cuffed: yes   Successful intubation technique: direct laryngoscopy  Endotracheal tube insertion site: oral  Blade: Rafael  Blade size: 3  ETT size (mm): 7.0  Cormack-Lehane Classification: grade I - full view of glottis  Placement verified by: chest auscultation and capnometry   Measured from: lips  ETT/EBT  to lips (cm): 22  Number of attempts at approach: 1  Assessment: lips, teeth, and gum same as pre-op and atraumatic intubation    Additional Comments  Negative epigastric sounds, Breath sound equal bilaterally with symmetric chest rise and fall

## 2023-12-20 NOTE — OP NOTE
Date of operation: December 20, 2023    Attending surgeon: Dr. Medhat Shultz MD  Surgical Assistance: JEN Block    Preoperative diagnosis: Recurrent disc herniation at L4-5 causing compression of the nerve root.    Postoperative diagnosis: The same    Operations performed: 1.  Redo microlumbar discectomy, less than 3 months, at L4-5 on the right  2. Use of intraoperative microscope with microdissection techniques    Findings: Several large disc fragments removed, circumferential decompression of the nerve root achieved.    Specimens: None    Indications: Recurrent disc herniation causing severe right leg pain    Procedure in detail: The patient was brought back to the operating room and placed under general anesthesia.  They were endotracheally intubated.  They were positioned prone on a Cale frame with all pressure points well padded.  At this time, the patient's lumbar back was then prepped and draped in a sterile fashion.  A timeout was performed and antibiotics were given.  At this time, we used fluoroscopy to localize the L4-5 disc space.  We then extended the patient's prior incision both superiorly and inferiorly.  At this time, we incised our incision marked with a knife.  Using Bovie cautery we carried out careful dissection exposing the spinous process as well as the prior laminotomy site.  We then worked to expose bone both superiorly, laterally and inferiorly around our prior defect.  At this time, we brought the microscope into the field.  Under direct microscopic vision and with microdissection techniques we dissected the bony lamina off of scar tissue in the area of the prior laminotomy.  Once this was done, we used drills and Kerrisons to remove a small amount of bone around the prior laminotomy site.  Once this was done, we then continued to use dissection techniques to expose the dura and then deep to the dura where we encountered several large pieces of disc fragment.  Using  pituitaries, we removed these disc fragments.  Once this was done, we palpated with a nerve hook and confirmed that there was significant decompression of the thecal sac as well as the traversing nerve root.    At this time, we copiously irrigated the field and achieved complete hemostasis with Gelfoam powder. We then injected 2 mL of Depo-Medrol around the nerve root. At this time, we slowly removed the MetRx tube using bipolar cautery of the muscle as we removed it.  We then turned our attention to the closure where the fascia was closed with an interrupted 2-0 Vicryl stitch.  The dermis was closed with interrupted inverted 3-0 Vicryl stitches and the skin with a Monocryl stitch and Dermabond.    Leighann Castillo was present for all portions of the surgery and assisted with patient positioning, opening, retraction, suturing, and closing.  At the end of the case all counts were correct x2 and there were no complications noted.

## 2023-12-21 PROCEDURE — 99024 POSTOP FOLLOW-UP VISIT: CPT

## 2023-12-21 PROCEDURE — 25010000002 DEXAMETHASONE PER 1 MG

## 2023-12-21 PROCEDURE — 25010000002 ENOXAPARIN PER 10 MG: Performed by: STUDENT IN AN ORGANIZED HEALTH CARE EDUCATION/TRAINING PROGRAM

## 2023-12-21 RX ORDER — DIAZEPAM 5 MG/1
5 TABLET ORAL EVERY 6 HOURS PRN
Status: DISCONTINUED | OUTPATIENT
Start: 2023-12-21 | End: 2023-12-22 | Stop reason: HOSPADM

## 2023-12-21 RX ORDER — DEXAMETHASONE SODIUM PHOSPHATE 4 MG/ML
4 INJECTION, SOLUTION INTRA-ARTICULAR; INTRALESIONAL; INTRAMUSCULAR; INTRAVENOUS; SOFT TISSUE EVERY 6 HOURS
Status: DISCONTINUED | OUTPATIENT
Start: 2023-12-21 | End: 2023-12-22 | Stop reason: HOSPADM

## 2023-12-21 RX ADMIN — CETIRIZINE HYDROCHLORIDE 10 MG: 10 TABLET, FILM COATED ORAL at 08:07

## 2023-12-21 RX ADMIN — OXYCODONE HYDROCHLORIDE AND ACETAMINOPHEN 1 TABLET: 5; 325 TABLET ORAL at 14:21

## 2023-12-21 RX ADMIN — Medication 10 ML: at 19:59

## 2023-12-21 RX ADMIN — METHOCARBAMOL 500 MG: 500 TABLET ORAL at 08:06

## 2023-12-21 RX ADMIN — GABAPENTIN 600 MG: 300 CAPSULE ORAL at 21:04

## 2023-12-21 RX ADMIN — DEXAMETHASONE SODIUM PHOSPHATE 4 MG: 4 INJECTION, SOLUTION INTRAMUSCULAR; INTRAVENOUS at 15:22

## 2023-12-21 RX ADMIN — GABAPENTIN 600 MG: 300 CAPSULE ORAL at 14:21

## 2023-12-21 RX ADMIN — DEXAMETHASONE SODIUM PHOSPHATE 4 MG: 4 INJECTION, SOLUTION INTRAMUSCULAR; INTRAVENOUS at 21:04

## 2023-12-21 RX ADMIN — SENNOSIDES AND DOCUSATE SODIUM 1 TABLET: 8.6; 5 TABLET ORAL at 08:06

## 2023-12-21 RX ADMIN — ENOXAPARIN SODIUM 40 MG: 100 INJECTION SUBCUTANEOUS at 20:00

## 2023-12-21 RX ADMIN — Medication 3 ML: at 08:07

## 2023-12-21 RX ADMIN — DEXAMETHASONE SODIUM PHOSPHATE 4 MG: 4 INJECTION, SOLUTION INTRAMUSCULAR; INTRAVENOUS at 09:24

## 2023-12-21 RX ADMIN — OXYCODONE HYDROCHLORIDE AND ACETAMINOPHEN 1 TABLET: 5; 325 TABLET ORAL at 05:14

## 2023-12-21 RX ADMIN — DIAZEPAM 5 MG: 5 TABLET ORAL at 17:58

## 2023-12-21 RX ADMIN — OXYCODONE HYDROCHLORIDE AND ACETAMINOPHEN 1 TABLET: 5; 325 TABLET ORAL at 09:24

## 2023-12-21 RX ADMIN — GABAPENTIN 600 MG: 300 CAPSULE ORAL at 05:14

## 2023-12-21 RX ADMIN — OXYCODONE HYDROCHLORIDE AND ACETAMINOPHEN 1 TABLET: 5; 325 TABLET ORAL at 19:57

## 2023-12-21 RX ADMIN — DIAZEPAM 5 MG: 5 TABLET ORAL at 09:24

## 2023-12-21 NOTE — PLAN OF CARE
Problem: Adult Inpatient Plan of Care  Goal: Plan of Care Review  Outcome: Ongoing, Progressing  Goal: Patient-Specific Goal (Individualized)  Outcome: Ongoing, Progressing  Goal: Absence of Hospital-Acquired Illness or Injury  Outcome: Ongoing, Progressing  Intervention: Identify and Manage Fall Risk  Recent Flowsheet Documentation  Taken 12/21/2023 0602 by Mani Johansen RN  Safety Promotion/Fall Prevention:   activity supervised   assistive device/personal items within reach   clutter free environment maintained   fall prevention program maintained   gait belt   mobility aid in reach   nonskid shoes/slippers when out of bed   room organization consistent   safety round/check completed  Taken 12/21/2023 0415 by Mani Johansen RN  Safety Promotion/Fall Prevention:   activity supervised   assistive device/personal items within reach   clutter free environment maintained   fall prevention program maintained   gait belt   mobility aid in reach   nonskid shoes/slippers when out of bed   room organization consistent   safety round/check completed  Taken 12/21/2023 0211 by Mani Johansen RN  Safety Promotion/Fall Prevention:   activity supervised   assistive device/personal items within reach   clutter free environment maintained   fall prevention program maintained   gait belt   mobility aid in reach   nonskid shoes/slippers when out of bed   room organization consistent   safety round/check completed  Taken 12/21/2023 0004 by Mani Johansen RN  Safety Promotion/Fall Prevention:   activity supervised   assistive device/personal items within reach   clutter free environment maintained   fall prevention program maintained   gait belt   mobility aid in reach   nonskid shoes/slippers when out of bed   safety round/check completed   room organization consistent  Taken 12/20/2023 2020 by Mani Johansen RN  Safety Promotion/Fall Prevention:   activity supervised   assistive device/personal items within reach   clutter  free environment maintained   fall prevention program maintained   gait belt   mobility aid in reach   nonskid shoes/slippers when out of bed   room organization consistent   safety round/check completed  Intervention: Prevent Skin Injury  Recent Flowsheet Documentation  Taken 12/21/2023 0602 by Mani Johansen RN  Body Position: position changed independently  Taken 12/21/2023 0415 by Mani Johansen RN  Body Position: position changed independently  Taken 12/21/2023 0211 by Mani Johansen RN  Body Position: supine, legs elevated  Taken 12/21/2023 0004 by Mani Johansen RN  Body Position:   tilted   weight shifting  Taken 12/20/2023 2020 by Mani Johansen RN  Body Position: supine, legs elevated  Skin Protection:   adhesive use limited   tubing/devices free from skin contact  Intervention: Prevent and Manage VTE (Venous Thromboembolism) Risk  Recent Flowsheet Documentation  Taken 12/21/2023 0602 by Mani Johansen RN  Activity Management: activity encouraged  VTE Prevention/Management:   bilateral   sequential compression devices on  Taken 12/21/2023 0415 by Mani Johansen RN  Activity Management: activity encouraged  VTE Prevention/Management:   bilateral   sequential compression devices on  Taken 12/21/2023 0211 by Mani Johansen RN  VTE Prevention/Management:   bilateral   sequential compression devices on  Taken 12/21/2023 0004 by Mani Johansen RN  VTE Prevention/Management:   bilateral   sequential compression devices on  Taken 12/20/2023 2110 by Mani Johansen RN  Activity Management:   ambulated in room   ambulated to bathroom  Taken 12/20/2023 2020 by Mani Johansen RN  Activity Management: activity encouraged  VTE Prevention/Management:   bilateral   sequential compression devices on  Goal: Optimal Comfort and Wellbeing  Outcome: Ongoing, Progressing  Intervention: Monitor Pain and Promote Comfort  Recent Flowsheet Documentation  Taken 12/21/2023 0602 by Mani Johansen RN  Pain Management  Interventions: see MAR  Taken 12/21/2023 0415 by Mani Johansen RN  Pain Management Interventions: see MAR  Taken 12/21/2023 0211 by Mani Johansen RN  Pain Management Interventions: see MAR  Taken 12/21/2023 0004 by aMni Johansen RN  Pain Management Interventions: see MAR  Taken 12/20/2023 2020 by Mani Johansen RN  Pain Management Interventions:   position adjusted   see MAR  Intervention: Provide Person-Centered Care  Recent Flowsheet Documentation  Taken 12/20/2023 2020 by Mani Johansen RN  Trust Relationship/Rapport:   care explained   questions answered   questions encouraged  Goal: Readiness for Transition of Care  Outcome: Ongoing, Progressing     Problem: Bleeding (Spinal Surgery)  Goal: Absence of Bleeding  Outcome: Ongoing, Progressing     Problem: Bowel Motility Impaired (Spinal Surgery)  Goal: Effective Bowel Elimination  Outcome: Ongoing, Progressing     Problem: Fluid and Electrolyte Imbalance (Spinal Surgery)  Goal: Fluid and Electrolyte Balance  Outcome: Ongoing, Progressing     Problem: Functional Ability Impaired (Spinal Surgery)  Goal: Optimal Functional Ability  Outcome: Ongoing, Progressing  Intervention: Optimize Functional Status  Recent Flowsheet Documentation  Taken 12/21/2023 0602 by Mani Johansen RN  Activity Management: activity encouraged  Positioning/Transfer Devices:   pillows   in use  Taken 12/21/2023 0415 by Mani Johansen RN  Activity Management: activity encouraged  Positioning/Transfer Devices:   pillows   in use  Taken 12/21/2023 0211 by Mani Johansen RN  Positioning/Transfer Devices:   pillows   in use  Taken 12/21/2023 0004 by Mani Johansen RN  Positioning/Transfer Devices:   pillows   in use  Taken 12/20/2023 2110 by Mani Johansen RN  Activity Management:   ambulated in room   ambulated to bathroom  Taken 12/20/2023 2020 by Mani Johansen RN  Activity Management: activity encouraged  Positioning/Transfer Devices:   pillows   in use     Problem: Infection  (Spinal Surgery)  Goal: Absence of Infection Signs and Symptoms  Outcome: Ongoing, Progressing     Problem: Neurologic Impairment (Spinal Surgery)  Goal: Optimal Neurologic Function  Outcome: Ongoing, Progressing  Intervention: Optimize Neurologic Function  Recent Flowsheet Documentation  Taken 12/21/2023 0602 by Mani Johansen RN  Body Position: position changed independently  Taken 12/21/2023 0415 by Mani Johansen RN  Body Position: position changed independently  Taken 12/21/2023 0211 by Mani Johansen RN  Body Position: supine, legs elevated  Taken 12/21/2023 0004 by Mani Johansen RN  Body Position:   tilted   weight shifting  Taken 12/20/2023 2020 by Mani Johansen RN  Body Position: supine, legs elevated     Problem: Ongoing Anesthesia Effects (Spinal Surgery)  Goal: Anesthesia/Sedation Recovery  Outcome: Ongoing, Progressing  Intervention: Optimize Anesthesia Recovery  Recent Flowsheet Documentation  Taken 12/21/2023 0602 by Mani Johansen RN  Safety Promotion/Fall Prevention:   activity supervised   assistive device/personal items within reach   clutter free environment maintained   fall prevention program maintained   gait belt   mobility aid in reach   nonskid shoes/slippers when out of bed   room organization consistent   safety round/check completed  Taken 12/21/2023 0415 by Mani Johansen RN  Safety Promotion/Fall Prevention:   activity supervised   assistive device/personal items within reach   clutter free environment maintained   fall prevention program maintained   gait belt   mobility aid in reach   nonskid shoes/slippers when out of bed   room organization consistent   safety round/check completed  Taken 12/21/2023 0211 by Mani Johansen RN  Safety Promotion/Fall Prevention:   activity supervised   assistive device/personal items within reach   clutter free environment maintained   fall prevention program maintained   gait belt   mobility aid in reach   nonskid shoes/slippers when out of  bed   room organization consistent   safety round/check completed  Taken 12/21/2023 0004 by Mani Johansen RN  Safety Promotion/Fall Prevention:   activity supervised   assistive device/personal items within reach   clutter free environment maintained   fall prevention program maintained   gait belt   mobility aid in reach   nonskid shoes/slippers when out of bed   safety round/check completed   room organization consistent  Taken 12/20/2023 2200 by Mani Johansen RN  Patient Tolerance (IS): good  Administration (IS): self-administered  Level Incentive Spirometer (mL): 2000  Incentive Spirometer Predicted Level (mL): 2000  Number of Repetitions (IS): 5  Taken 12/20/2023 2020 by Mani Johansen RN  Safety Promotion/Fall Prevention:   activity supervised   assistive device/personal items within reach   clutter free environment maintained   fall prevention program maintained   gait belt   mobility aid in reach   nonskid shoes/slippers when out of bed   room organization consistent   safety round/check completed  Taken 12/20/2023 2000 by Mani Johansen RN  Patient Tolerance (IS): good  Administration (IS): self-administered     Problem: Pain (Spinal Surgery)  Goal: Acceptable Pain Control  Outcome: Ongoing, Progressing  Intervention: Prevent or Manage Pain  Recent Flowsheet Documentation  Taken 12/21/2023 0602 by Mani Johansen RN  Pain Management Interventions: see MAR  Taken 12/21/2023 0415 by Mani Johansen RN  Pain Management Interventions: see MAR  Taken 12/21/2023 0211 by Mani Johansen RN  Pain Management Interventions: see MAR  Taken 12/21/2023 0004 by Mani Johansen RN  Pain Management Interventions: see MAR  Taken 12/20/2023 2020 by Mani Johansen RN  Pain Management Interventions:   position adjusted   see MAR  Diversional Activities: television     Problem: Postoperative Nausea and Vomiting (Spinal Surgery)  Goal: Nausea and Vomiting Relief  Outcome: Ongoing, Progressing     Problem: Postoperative  Urinary Retention (Spinal Surgery)  Goal: Effective Urinary Elimination  Outcome: Ongoing, Progressing     Problem: Respiratory Compromise (Spinal Surgery)  Goal: Effective Oxygenation and Ventilation  Outcome: Ongoing, Progressing  Intervention: Optimize Oxygenation and Ventilation  Recent Flowsheet Documentation  Taken 12/21/2023 0602 by Mani Johansen RN  Head of Bed (HOB) Positioning: HOB at 15 degrees  Taken 12/21/2023 0415 by Mani Johansen RN  Head of Bed (HOB) Positioning: HOB at 15 degrees  Taken 12/21/2023 0211 by Mani Johansen RN  Head of Bed ([facility]) Positioning: HOB at 15 degrees  Taken 12/21/2023 0004 by Mani Johansen RN  Head of Bed ([facility]) Positioning: HOB at 15 degrees  Taken 12/20/2023 2020 by Mani Johansen RN  Head of Bed ([facility]) Positioning: HOB at 15 degrees   Goal Outcome Evaluation:

## 2023-12-21 NOTE — PROGRESS NOTES
"  [facility]    NEUROSURGERY PROGRESS NOTE    12/21/23    Chief Complaint: Lumbar spondylosis    Subjective: Continues to have a significant amount of back pain.  She reports her leg pain and numbness is now intermittent in nature and worse with ambulation.  Numbness in her knee down to her foot occur when sitting up.  She also reports significant amount of right hip pain that is also intermittent in nature, worse with sitting up    1 Day Post-Op    Objective:     /71 (BP Location: Left arm, Patient Position: Lying)   Pulse 81   Temp 98.1 °F (36.7 °C) (Oral)   Resp 18   Ht 167.6 cm (66\")   Wt 100 kg (221 lb)   LMP 11/01/2023 (Approximate)   SpO2 92%   BMI 35.67 kg/m²        Physical Exam  Patient appears uncomfortable, crying after turning in bed  Awake, alert and oriented x 3  Speech f/c  Opens eyes spontaneously  EOM intact bilaterally  Pupils 3mm reactive bilaterally  Face symmetric  Motor exam shows 5/5 strength in bilateral lower extremities  Incision with mild serosanguineous drainage on bandage otherwise intact. No swelling of incision, significant erythema, purulent drainage.        Intake/Output Summary (Last 24 hours) at 12/21/2023 0906  Last data filed at 12/20/2023 1700  Gross per 24 hour   Intake 1300 ml   Output --   Net 1300 ml         Current Facility-Administered Medications:     cetirizine (zyrTEC) tablet 10 mg, 10 mg, Oral, Daily, Medhat Shultz MD, 10 mg at 12/21/23 0807    dexAMETHasone (DECADRON) injection 4 mg, 4 mg, Intravenous, Q6H, Leighann Castillo PA-C    diazePAM (VALIUM) tablet 5 mg, 5 mg, Oral, Q6H PRN, Medhat Shultz MD    docusate sodium (COLACE) capsule 100 mg, 100 mg, Oral, BID PRN, Medhat Shultz MD    Enoxaparin Sodium (LOVENOX) syringe 40 mg, 40 mg, Subcutaneous, Nightly, Medhat Shultz MD    gabapentin (NEURONTIN) capsule 600 mg, 600 mg, Oral, Q8H, Medhat Shultz MD, 600 mg at 12/21/23 0514   "  HYDROmorphone (DILAUDID) injection 0.5 mg, 0.5 mg, Intravenous, Q2H PRN, Medhat Shultz MD, 0.5 mg at 12/20/23 2132    lactated ringers infusion, 9 mL/hr, Intravenous, Continuous, Medhat Shultz MD, Last Rate: 9 mL/hr at 12/20/23 0844, 9 mL/hr at 12/20/23 0844    magnesium hydroxide (MILK OF MAGNESIA) suspension 10 mL, 10 mL, Oral, Daily PRN, eMdhat Shultz MD    ondansetron (ZOFRAN) injection 4 mg, 4 mg, Intravenous, Q6H PRN, Medhat Shultz MD    oxyCODONE-acetaminophen (PERCOCET) 5-325 MG per tablet 1 tablet, 1 tablet, Oral, Q4H PRN, Medhat Shultz MD, 1 tablet at 12/21/23 0514    polyethylene glycol (MIRALAX) packet 17 g, 17 g, Oral, Daily PRN, Medhat Shultz MD    sennosides-docusate (PERICOLACE) 8.6-50 MG per tablet 1 tablet, 1 tablet, Oral, Daily, Medhat Shultz MD, 1 tablet at 12/21/23 0806    sennosides-docusate (PERICOLACE) 8.6-50 MG per tablet 1 tablet, 1 tablet, Oral, Nightly PRN, Medhat Shultz MD    sodium chloride 0.9 % flush 10 mL, 10 mL, Intravenous, PRN, Medhat Shultz MD    sodium chloride 0.9 % flush 3 mL, 3 mL, Intravenous, Q12H, Medhat Shultz MD, 3 mL at 12/21/23 0807    sodium chloride 0.9 % infusion 40 mL, 40 mL, Intravenous, PRN, Medhat Shultz MD    Laboratory Results:        Lab 12/14/23  1246   WBC 8.76   HEMOGLOBIN 14.1   HEMATOCRIT 42.1   PLATELETS 341   NEUTROS ABS 5.93   IMMATURE GRANS (ABS) 0.03   LYMPHS ABS 1.85   MONOS ABS 0.60   EOS ABS 0.32   MCV 94.0   SED RATE 12   CRP 0.71*                             Brief Urine Lab Results  (Last result in the past 365 days)        Color   Clarity   Blood   Leuk Est   Nitrite   Protein   CREAT   Urine HCG        12/20/23 0834               Negative             Microbiology Results (last 10 days)       Procedure Component Value - Date/Time    MRSA Screen Culture (Outpatient) - Swab, Nares [[id number]]  (Normal) Collected: 12/15/23 1359    Lab Status: Final result Specimen: Swab from Nares Updated:  12/16/23 1434     MRSA Screen Cx No Methicillin Resistant Staphylococcus aureus isolated    Narrative:      The negative predictive value of this diagnostic test is high and should only be used to consider de-escalating anti-MRSA therapy. A positive result may indicate colonization with MRSA and must be correlated clinically.  The negative predictive value of this diagnostic test is high and should only be used to consider de-escalating anti-MRSA therapy. A positive result may indicate colonization with MRSA and must be correlated clinically.                     Diagnostic Imaging: I personally reviewed and interpreted the new imaging    Assessment and plan:  This is a 33-year-old female who underwent redo open right L4-5 lumbar discectomy yesterday with Dr. Shultz.  Surgery went without complications and several large pieces of disc fragment removed.  Postoperatively, patient remains neurologically intact on exam.  Incision shows mild amount of serosanguineous drainage without swelling or extensive erythema.  Patient has a significant amount of  incisional pain as well as intermittent right hip pain numbness and tingling of her right leg.  Her leg pain is worse when she is sitting up and walking.  We will d/c Robaxin and add Valium 5 mg every 6HPRN.  Also added Decadron 4 mg every 6 hours.  Will check on her later today to see how the medication changes are helping.  Plan to get her up ambulating later today to see how she does.  If she continues to have significant back and leg pain, we may proceed with new MRI lumbar spine with and without contrast later today.  Please call with questions or concerns.      Any copied data from previous notes included in the (1) HPI, (2) PE, (3) MDM and/or Assessment and Plan has been reviewed and accurate as of 12/21/23.    Leighann Castillo PA-C  12/21/23  09:06 EST

## 2023-12-21 NOTE — NURSING NOTE
Pt continues to c/o having a lot of back pain nelly after ambulation.  Robaxin was d/c'd and pt was started on Valium prn.  Pt continues to use ice packs as well.  Family at bedside earlier today.  Will continue to evaluate pt as directed and decide if MRI is warranted tomorrow.

## 2023-12-22 ENCOUNTER — APPOINTMENT (OUTPATIENT)
Dept: MRI IMAGING | Facility: HOSPITAL | Age: 33
End: 2023-12-22
Payer: COMMERCIAL

## 2023-12-22 VITALS
BODY MASS INDEX: 35.52 KG/M2 | DIASTOLIC BLOOD PRESSURE: 96 MMHG | TEMPERATURE: 97.6 F | SYSTOLIC BLOOD PRESSURE: 152 MMHG | OXYGEN SATURATION: 98 % | RESPIRATION RATE: 18 BRPM | HEIGHT: 66 IN | WEIGHT: 221 LBS | HEART RATE: 74 BPM

## 2023-12-22 PROCEDURE — 72148 MRI LUMBAR SPINE W/O DYE: CPT

## 2023-12-22 PROCEDURE — 25010000002 DEXAMETHASONE PER 1 MG

## 2023-12-22 PROCEDURE — 99024 POSTOP FOLLOW-UP VISIT: CPT

## 2023-12-22 RX ORDER — DIAZEPAM 5 MG/1
5 TABLET ORAL EVERY 8 HOURS PRN
Qty: 30 TABLET | Refills: 0 | Status: SHIPPED | OUTPATIENT
Start: 2023-12-22

## 2023-12-22 RX ORDER — OXYCODONE HYDROCHLORIDE AND ACETAMINOPHEN 5; 325 MG/1; MG/1
2 TABLET ORAL EVERY 4 HOURS PRN
Status: DISCONTINUED | OUTPATIENT
Start: 2023-12-22 | End: 2023-12-22 | Stop reason: HOSPADM

## 2023-12-22 RX ORDER — OXYCODONE AND ACETAMINOPHEN 7.5; 325 MG/1; MG/1
1 TABLET ORAL EVERY 4 HOURS PRN
Qty: 30 TABLET | Refills: 0 | Status: SHIPPED | OUTPATIENT
Start: 2023-12-22 | End: 2023-12-29 | Stop reason: SDUPTHER

## 2023-12-22 RX ORDER — NALOXONE HYDROCHLORIDE 4 MG/.1ML
SPRAY NASAL
Qty: 2 EACH | Refills: 0 | Status: SHIPPED | OUTPATIENT
Start: 2023-12-22

## 2023-12-22 RX ORDER — DEXAMETHASONE 4 MG/1
4 TABLET ORAL 2 TIMES DAILY
Qty: 4 TABLET | Refills: 0 | Status: SHIPPED | OUTPATIENT
Start: 2023-12-22 | End: 2023-12-24

## 2023-12-22 RX ADMIN — CETIRIZINE HYDROCHLORIDE 10 MG: 10 TABLET, FILM COATED ORAL at 08:30

## 2023-12-22 RX ADMIN — GABAPENTIN 600 MG: 300 CAPSULE ORAL at 13:41

## 2023-12-22 RX ADMIN — DEXAMETHASONE SODIUM PHOSPHATE 4 MG: 4 INJECTION, SOLUTION INTRAMUSCULAR; INTRAVENOUS at 15:05

## 2023-12-22 RX ADMIN — GABAPENTIN 600 MG: 300 CAPSULE ORAL at 05:03

## 2023-12-22 RX ADMIN — DIAZEPAM 5 MG: 5 TABLET ORAL at 08:29

## 2023-12-22 RX ADMIN — DEXAMETHASONE SODIUM PHOSPHATE 4 MG: 4 INJECTION, SOLUTION INTRAMUSCULAR; INTRAVENOUS at 03:07

## 2023-12-22 RX ADMIN — DEXAMETHASONE SODIUM PHOSPHATE 4 MG: 4 INJECTION, SOLUTION INTRAMUSCULAR; INTRAVENOUS at 09:36

## 2023-12-22 RX ADMIN — SENNOSIDES AND DOCUSATE SODIUM 1 TABLET: 8.6; 5 TABLET ORAL at 08:29

## 2023-12-22 RX ADMIN — OXYCODONE HYDROCHLORIDE AND ACETAMINOPHEN 1 TABLET: 5; 325 TABLET ORAL at 08:30

## 2023-12-22 RX ADMIN — OXYCODONE HYDROCHLORIDE AND ACETAMINOPHEN 2 TABLET: 5; 325 TABLET ORAL at 12:51

## 2023-12-22 RX ADMIN — OXYCODONE HYDROCHLORIDE AND ACETAMINOPHEN 1 TABLET: 5; 325 TABLET ORAL at 03:07

## 2023-12-22 RX ADMIN — Medication 3 ML: at 08:30

## 2023-12-22 RX ADMIN — DIAZEPAM 5 MG: 5 TABLET ORAL at 15:08

## 2023-12-22 NOTE — DISCHARGE INSTRUCTIONS
Patient is to limit activity, and to avoid bending, lifting, twisting.  In general we recommend limiting physical activity until first postoperative appointment in 2 weeks.  Attempt to not lift more than 5 to 10 pounds. . The greatest risk for re-herniation of the disc is 6-8 weeks, lessening activity helps lower that risk.    Patient may shower 48 hours postoperatively. Do not submerge the incision site, this includes baths, pools, hot tubs, etc. Keep incision as dry as possible. You have sutures that will dissolve on their own.    Bandage may be removed 48 hours postoperatively.  If for comfort reasons she prefers to have the bandage on, that is welcome.  If wound is seeping a small amount, we recommend to continue bandaging.     Common postoperative symptoms from a lumbar discectomy include: Muscle spasms in the lower back, postoperative back pain, a lesser intensity of symptoms experienced prior to surgery.    Patient is to contact our office if they she any the following:    Incision opening/stitches coming apart.  Significant swelling around the incision site. (May attempt to ice incision first.)  Increased redness around the incision site and it becoming warm to the touch.  Signs on infection such as: fever, chills, etc.  It is normal to have a small amount yellow/red discharge for the first week, contact our office if it is excessive (soaking through entire bandages) or cloudy in nature.  Excessive pain.  The above list is not exhaustive.

## 2023-12-22 NOTE — PROGRESS NOTES
"  [facility]    NEUROSURGERY PROGRESS NOTE    12/22/23    Chief Complaint: Lumbar spondylosis    Subjective: Stable overnight. Notes slight improvement in pain since yesterday.     2 Days Post-Op    Objective:     /78 (BP Location: Left arm, Patient Position: Lying)   Pulse 73   Temp 97.7 °F (36.5 °C) (Oral)   Resp 18   Ht 167.6 cm (66\")   Wt 100 kg (221 lb)   LMP 11/01/2023 (Approximate)   SpO2 98%   BMI 35.67 kg/m²        Physical Exam  Awake, alert and oriented x 3  Speech f/c  Opens eyes spontaneously  EOM intact bilaterally  Pupils 3mm reactive bilaterally  Face symmetric  Motor exam shows 5/5 strength in bilateral lower extremities        Intake/Output Summary (Last 24 hours) at 12/22/2023 0851  Last data filed at 12/21/2023 1730  Gross per 24 hour   Intake 720 ml   Output --   Net 720 ml         Current Facility-Administered Medications:     cetirizine (zyrTEC) tablet 10 mg, 10 mg, Oral, Daily, Medhat Shultz MD, 10 mg at 12/22/23 0830    dexAMETHasone (DECADRON) injection 4 mg, 4 mg, Intravenous, Q6H, Leighann Castillo PA-C, 4 mg at 12/22/23 0307    diazePAM (VALIUM) tablet 5 mg, 5 mg, Oral, Q6H PRN, Medhat Shultz MD, 5 mg at 12/22/23 0829    docusate sodium (COLACE) capsule 100 mg, 100 mg, Oral, BID PRN, Medhat Shultz MD    Enoxaparin Sodium (LOVENOX) syringe 40 mg, 40 mg, Subcutaneous, Nightly, Medhat Shultz MD, 40 mg at 12/21/23 2000    gabapentin (NEURONTIN) capsule 600 mg, 600 mg, Oral, Q8H, Medhat Shultz MD, 600 mg at 12/22/23 0503    lactated ringers infusion, 9 mL/hr, Intravenous, Continuous, Medhat Shultz MD, Last Rate: 9 mL/hr at 12/20/23 0844, 9 mL/hr at 12/20/23 0844    magnesium hydroxide (MILK OF MAGNESIA) suspension 10 mL, 10 mL, Oral, Daily PRN, Medhat Shultz MD    ondansetron (ZOFRAN) injection 4 mg, 4 mg, Intravenous, Q6H PRN, Medhat Shultz MD    oxyCODONE-acetaminophen (PERCOCET) 5-325 " MG per tablet 2 tablet, 2 tablet, Oral, Q4H PRN, Medhat Shultz MD    polyethylene glycol (MIRALAX) packet 17 g, 17 g, Oral, Daily PRN, Medhat Shultz MD    sennosides-docusate (PERICOLACE) 8.6-50 MG per tablet 1 tablet, 1 tablet, Oral, Daily, Medhat Shultz MD, 1 tablet at 12/22/23 0829    sennosides-docusate (PERICOLACE) 8.6-50 MG per tablet 1 tablet, 1 tablet, Oral, Nightly PRN, Medhat Shultz MD    sodium chloride 0.9 % flush 10 mL, 10 mL, Intravenous, PRN, Medhat Shultz MD, 10 mL at 12/21/23 1959    sodium chloride 0.9 % flush 3 mL, 3 mL, Intravenous, Q12H, Medhat Shultz MD, 3 mL at 12/22/23 0830    sodium chloride 0.9 % infusion 40 mL, 40 mL, Intravenous, PRN, Medhat Shultz MD    Laboratory Results:                                Brief Urine Lab Results  (Last result in the past 365 days)        Color   Clarity   Blood   Leuk Est   Nitrite   Protein   CREAT   Urine HCG        12/20/23 0834               Negative             Microbiology Results (last 10 days)       Procedure Component Value - Date/Time    MRSA Screen Culture (Outpatient) - Swab, Nares [[id number]]  (Normal) Collected: 12/15/23 1359    Lab Status: Final result Specimen: Swab from Nares Updated: 12/16/23 1434     MRSA Screen Cx No Methicillin Resistant Staphylococcus aureus isolated    Narrative:      The negative predictive value of this diagnostic test is high and should only be used to consider de-escalating anti-MRSA therapy. A positive result may indicate colonization with MRSA and must be correlated clinically.  The negative predictive value of this diagnostic test is high and should only be used to consider de-escalating anti-MRSA therapy. A positive result may indicate colonization with MRSA and must be correlated clinically.                     Diagnostic Imaging: I personally reviewed and interpreted the new imaging    Assessment and plan:  This is a 33-year-old female who underwent redo open right L4-5  lumbar discectomy yesterday with Dr. Shultz.  Surgery went without complications and several large pieces of disc fragment removed.  Postoperatively, patient remains neurologically intact on exam. Patient notes slight improvement in her back/leg pain compared to yesterday.  With her continued pain, we will obtain a new lumbar MRI.  If her MRI is relatively stable, tentatively plan to discharge home later today with close outpatient follow-up.  Please call questions or concerns.      Any copied data from previous notes included in the (1) HPI, (2) PE, (3) MDM and/or Assessment and Plan has been reviewed and accurate as of 12/22/23.    Leighann Castillo PA-C  12/22/23  08:51 EST

## 2023-12-22 NOTE — PLAN OF CARE
Problem: Adult Inpatient Plan of Care  Goal: Plan of Care Review  Outcome: Ongoing, Progressing  Goal: Patient-Specific Goal (Individualized)  Outcome: Ongoing, Progressing  Goal: Absence of Hospital-Acquired Illness or Injury  Outcome: Ongoing, Progressing  Intervention: Identify and Manage Fall Risk  Recent Flowsheet Documentation  Taken 12/22/2023 0601 by Mani Johansen RN  Safety Promotion/Fall Prevention:   activity supervised   assistive device/personal items within reach   clutter free environment maintained   fall prevention program maintained   gait belt   mobility aid in reach   nonskid shoes/slippers when out of bed   room organization consistent   safety round/check completed  Taken 12/22/2023 0412 by Mani Johansen RN  Safety Promotion/Fall Prevention:   activity supervised   assistive device/personal items within reach   clutter free environment maintained   fall prevention program maintained   gait belt   mobility aid in reach   nonskid shoes/slippers when out of bed   room organization consistent   safety round/check completed  Taken 12/22/2023 0213 by Mani Johansen RN  Safety Promotion/Fall Prevention:   activity supervised   assistive device/personal items within reach   clutter free environment maintained   fall prevention program maintained   gait belt   mobility aid in reach   nonskid shoes/slippers when out of bed   room organization consistent   safety round/check completed  Taken 12/22/2023 0015 by Mani Johansen RN  Safety Promotion/Fall Prevention:   activity supervised   assistive device/personal items within reach   clutter free environment maintained   fall prevention program maintained   gait belt   mobility aid in reach   nonskid shoes/slippers when out of bed   room organization consistent   safety round/check completed  Taken 12/21/2023 2010 by Mani Johansen RN  Safety Promotion/Fall Prevention:   activity supervised   assistive device/personal items within reach   clutter  free environment maintained   fall prevention program maintained   gait belt   mobility aid in reach   nonskid shoes/slippers when out of bed   room organization consistent   safety round/check completed  Intervention: Prevent Skin Injury  Recent Flowsheet Documentation  Taken 12/22/2023 0601 by Mani Johansen RN  Body Position: position changed independently  Taken 12/22/2023 0412 by Mani Johansen RN  Body Position: position changed independently  Taken 12/22/2023 0213 by Mani Johansen RN  Body Position: position changed independently  Taken 12/22/2023 0015 by Mani Johansen RN  Body Position: position changed independently  Taken 12/21/2023 2010 by Mani Johansen RN  Body Position: position changed independently  Skin Protection:   adhesive use limited   tubing/devices free from skin contact  Intervention: Prevent and Manage VTE (Venous Thromboembolism) Risk  Recent Flowsheet Documentation  Taken 12/22/2023 0601 by Mani Johansen RN  VTE Prevention/Management:   bilateral   sequential compression devices on  Taken 12/22/2023 0412 by Mani Johansen RN  VTE Prevention/Management:   bilateral   sequential compression devices on  Taken 12/22/2023 0313 by Mani Johansen RN  Activity Management: ambulated in room  Taken 12/22/2023 0213 by Mani Johansen RN  VTE Prevention/Management:   bilateral   sequential compression devices on  Taken 12/22/2023 0015 by Mani Johansen RN  VTE Prevention/Management:   bilateral   sequential compression devices on  Taken 12/21/2023 2010 by Mani Johansen RN  Activity Management: activity encouraged  VTE Prevention/Management:   bilateral   sequential compression devices on  Range of Motion: active ROM (range of motion) encouraged  Intervention: Prevent Infection  Recent Flowsheet Documentation  Taken 12/22/2023 0601 by Mani Johansen RN  Infection Prevention:   environmental surveillance performed   equipment surfaces disinfected   personal protective equipment utilized    rest/sleep promoted   single patient room provided  Taken 12/22/2023 0412 by Mani Johansen RN  Infection Prevention:   environmental surveillance performed   hand hygiene promoted   rest/sleep promoted   single patient room provided  Taken 12/22/2023 0213 by Mani Johansen RN  Infection Prevention:   environmental surveillance performed   hand hygiene promoted   single patient room provided   rest/sleep promoted  Taken 12/22/2023 0015 by Mani Johansen RN  Infection Prevention:   environmental surveillance performed   personal protective equipment utilized   rest/sleep promoted   single patient room provided  Taken 12/21/2023 2010 by Mani Johansen RN  Infection Prevention:   environmental surveillance performed   hand hygiene promoted   single patient room provided   rest/sleep promoted  Goal: Optimal Comfort and Wellbeing  Outcome: Ongoing, Progressing  Intervention: Monitor Pain and Promote Comfort  Recent Flowsheet Documentation  Taken 12/22/2023 0412 by Mani Johansen RN  Pain Management Interventions:   see MAR   position adjusted   cold applied  Taken 12/22/2023 0213 by Mani Johansen RN  Pain Management Interventions: quiet environment facilitated  Taken 12/22/2023 0015 by Mani Johansen RN  Pain Management Interventions:   see MAR   quiet environment facilitated  Taken 12/21/2023 2010 by Mani Johansen RN  Pain Management Interventions:   see MAR   position adjusted  Intervention: Provide Person-Centered Care  Recent Flowsheet Documentation  Taken 12/21/2023 2010 by Mani Johansen RN  Trust Relationship/Rapport: care explained  Goal: Readiness for Transition of Care  Outcome: Ongoing, Progressing     Problem: Bleeding (Spinal Surgery)  Goal: Absence of Bleeding  Outcome: Ongoing, Progressing     Problem: Bowel Motility Impaired (Spinal Surgery)  Goal: Effective Bowel Elimination  Outcome: Ongoing, Progressing  Intervention: Enhance Bowel Motility and Elimination  Recent Flowsheet  Documentation  Taken 12/21/2023 2010 by Mani Johansen RN  Bowel Motility Enhancement: oral intake encouraged     Problem: Fluid and Electrolyte Imbalance (Spinal Surgery)  Goal: Fluid and Electrolyte Balance  Outcome: Ongoing, Progressing     Problem: Functional Ability Impaired (Spinal Surgery)  Goal: Optimal Functional Ability  Outcome: Ongoing, Progressing  Intervention: Optimize Functional Status  Recent Flowsheet Documentation  Taken 12/22/2023 0601 by Mani Johansen RN  Positioning/Transfer Devices:   pillows   in use  Taken 12/22/2023 0412 by Mani Johansen RN  Positioning/Transfer Devices:   pillows   in use  Taken 12/22/2023 0313 by Mani Johansen RN  Activity Management: ambulated in room  Taken 12/22/2023 0213 by Mani Johansen RN  Positioning/Transfer Devices:   pillows   in use  Self-Care Promotion: independence encouraged  Taken 12/22/2023 0015 by Mani Johansen RN  Positioning/Transfer Devices:   pillows   in use  Self-Care Promotion: independence encouraged  Taken 12/21/2023 2010 by Mani Johansen RN  Activity Management: activity encouraged  Positioning/Transfer Devices:   pillows   in use  Self-Care Promotion: independence encouraged     Problem: Infection (Spinal Surgery)  Goal: Absence of Infection Signs and Symptoms  Outcome: Ongoing, Progressing  Intervention: Prevent or Manage Infection  Recent Flowsheet Documentation  Taken 12/22/2023 0601 by Mani Johansen RN  Infection Prevention:   environmental surveillance performed   equipment surfaces disinfected   personal protective equipment utilized   rest/sleep promoted   single patient room provided  Taken 12/22/2023 0412 by Mani Johansen RN  Infection Prevention:   environmental surveillance performed   hand hygiene promoted   rest/sleep promoted   single patient room provided  Taken 12/22/2023 0213 by Mani Johansen RN  Infection Prevention:   environmental surveillance performed   hand hygiene promoted   single patient room  provided   rest/sleep promoted  Taken 12/22/2023 0015 by Mani Johansen RN  Infection Prevention:   environmental surveillance performed   personal protective equipment utilized   rest/sleep promoted   single patient room provided  Taken 12/21/2023 2010 by Mani Johansen RN  Infection Prevention:   environmental surveillance performed   hand hygiene promoted   single patient room provided   rest/sleep promoted     Problem: Neurologic Impairment (Spinal Surgery)  Goal: Optimal Neurologic Function  Outcome: Ongoing, Progressing  Intervention: Optimize Neurologic Function  Recent Flowsheet Documentation  Taken 12/22/2023 0601 by Mani Johansen RN  Body Position: position changed independently  Taken 12/22/2023 0412 by Mani Johansen RN  Body Position: position changed independently  Taken 12/22/2023 0213 by Mani Johansen RN  Body Position: position changed independently  Taken 12/22/2023 0015 by Mani Johansen RN  Body Position: position changed independently  Taken 12/21/2023 2010 by Mani Johansen RN  Body Position: position changed independently  Range of Motion: active ROM (range of motion) encouraged     Problem: Ongoing Anesthesia Effects (Spinal Surgery)  Goal: Anesthesia/Sedation Recovery  Outcome: Ongoing, Progressing  Intervention: Optimize Anesthesia Recovery  Recent Flowsheet Documentation  Taken 12/22/2023 0601 by Mani Johansen RN  Safety Promotion/Fall Prevention:   activity supervised   assistive device/personal items within reach   clutter free environment maintained   fall prevention program maintained   gait belt   mobility aid in reach   nonskid shoes/slippers when out of bed   room organization consistent   safety round/check completed  Taken 12/22/2023 0543 by Mani Johansen RN  Patient Tolerance (IS): good  Administration (IS): self-administered  Level Incentive Spirometer (mL): 2000  Incentive Spirometer Predicted Level (mL): 2000  Number of Repetitions (IS): 5  Taken 12/22/2023 0412  by Johansen, Mani, RN  Safety Promotion/Fall Prevention:   activity supervised   assistive device/personal items within reach   clutter free environment maintained   fall prevention program maintained   gait belt   mobility aid in reach   nonskid shoes/slippers when out of bed   room organization consistent   safety round/check completed  Taken 12/22/2023 0213 by Mani Johansen RN  Safety Promotion/Fall Prevention:   activity supervised   assistive device/personal items within reach   clutter free environment maintained   fall prevention program maintained   gait belt   mobility aid in reach   nonskid shoes/slippers when out of bed   room organization consistent   safety round/check completed  Taken 12/22/2023 0015 by Mani Johansen RN  Safety Promotion/Fall Prevention:   activity supervised   assistive device/personal items within reach   clutter free environment maintained   fall prevention program maintained   gait belt   mobility aid in reach   nonskid shoes/slippers when out of bed   room organization consistent   safety round/check completed  Taken 12/21/2023 2200 by Mani Johansen RN  Patient Tolerance (IS): good  Administration (IS): self-administered  Taken 12/21/2023 2010 by Mani Johansen RN  Safety Promotion/Fall Prevention:   activity supervised   assistive device/personal items within reach   clutter free environment maintained   fall prevention program maintained   gait belt   mobility aid in reach   nonskid shoes/slippers when out of bed   room organization consistent   safety round/check completed  Taken 12/21/2023 2000 by Mani Johansen RN  Patient Tolerance (IS): good  Administration (IS): self-administered  Level Incentive Spirometer (mL): 2000  Incentive Spirometer Predicted Level (mL): 2000  Number of Repetitions (IS): 5     Problem: Pain (Spinal Surgery)  Goal: Acceptable Pain Control  Outcome: Ongoing, Progressing  Intervention: Prevent or Manage Pain  Recent Flowsheet  Documentation  Taken 12/22/2023 0412 by Mani Johansen RN  Pain Management Interventions:   see MAR   position adjusted   cold applied  Taken 12/22/2023 0213 by Mani Johansen RN  Pain Management Interventions: quiet environment facilitated  Taken 12/22/2023 0015 by Mani Johansen RN  Pain Management Interventions:   see MAR   quiet environment facilitated  Taken 12/21/2023 2010 by Mani Johansen RN  Pain Management Interventions:   see MAR   position adjusted  Diversional Activities:   television   smartphone     Problem: Postoperative Nausea and Vomiting (Spinal Surgery)  Goal: Nausea and Vomiting Relief  Outcome: Ongoing, Progressing     Problem: Postoperative Urinary Retention (Spinal Surgery)  Goal: Effective Urinary Elimination  Outcome: Ongoing, Progressing     Problem: Respiratory Compromise (Spinal Surgery)  Goal: Effective Oxygenation and Ventilation  Outcome: Ongoing, Progressing  Intervention: Optimize Oxygenation and Ventilation  Recent Flowsheet Documentation  Taken 12/22/2023 0601 by Mani Johansen RN  Head of Bed (HOB) Positioning: HOB elevated  Taken 12/22/2023 0412 by Mani Johansen RN  Head of Bed (HOB) Positioning: HOB at 15 degrees  Taken 12/22/2023 0213 by Mani Johansen RN  Head of Bed (HOB) Positioning: HOB at 20-30 degrees  Taken 12/22/2023 0015 by Mani Johansen RN  Head of Bed (HOB) Positioning: HOB at 20-30 degrees  Taken 12/21/2023 2010 by Mani Johansen RN  Head of Bed (HOB) Positioning: HOB at 20-30 degrees   Goal Outcome Evaluation:

## 2023-12-22 NOTE — CASE MANAGEMENT/SOCIAL WORK
Continued Stay Note  [facility]     Patient Name: Nelsy Bill  MRN: [id number]  Today's Date: 12/22/2023    Admit Date: 12/20/2023    Plan: Home   Discharge Plan       Row Name 12/22/23 0945       Plan    Plan Home    Patient/Family in Agreement with Plan yes    Plan Comments I spoke with Mrs. Bill, at the bedside. Pt states she is having a MRI today. She states if it is okay she will be discharged home. Pt lives with spouse and 4 children in [facility]. Independent with ADL's, works fulltime from home. No DME/HH/OPPT. No needs voiced or identified. Pt states her spouse will transport her home at discharge. PCP-Africa Lowe/JULIANNE. Confirmed [place] [facility] and prescriptions filled at [facility].    Final Discharge Disposition Code 01 - home or self-care                   Discharge Codes    No documentation.                       Sarita Baker RN

## 2023-12-22 NOTE — DISCHARGE SUMMARY
[facility]    Date of Admission: 12/20/2023  Date of Discharge:  12/22/2023    Discharge Diagnosis:   Lumbar radiculopathy    Procedures Performed  Procedure(s):  REDO OPEN LUMBAR DISCECTOMY L4-5       Presenting Problem/History of Present Illness  HNP (herniated nucleus pulposus), lumbar [M51.26]  Lumbar radiculopathy [M54.16]     Hospital Course  Patient is a 33 y.o. female with history of right L4-5 microdiscectomy with Dr. Shultz on 11/15/2023.  She had improvement in her right leg symptoms  postoperatively until a couple days postop when he developed return of severe right leg pain.  Repeat MRI lumbar spine with and without contrast showed new disc herniation causing compression of the nerve root again.  Due to her severe, uncontrolled pain and inability to complete ADLs, Dr. Shultz recommended redo open lumbar discectomy at L4-5 on the right.  She underwent surgery on 11/20/2023 which went without complications and large pieces of discs were removed successfully. Postoperatively, she continued to have back pain and right hip pain however this has slightly improved over her hospital stay.  Her right extremity numbness and tingling has also become more intermittent in nature.  She remains neurologically intact on exam and is voiding on her own.  Patient's pain controlled with p.o. pain meds.  We will plan to discharge home today and have the patient follow-up in 2 weeks for follow-up.  Activities and restrictions were discussed the patient.  Wound care was discussed with the patient.    Condition on Discharge:  satisfactory    Discharge Disposition  Home or Self Care    Discharge Medications     Discharge Medications        New Medications        Instructions Start Date   dexAMETHasone 4 MG tablet  Commonly known as: DECADRON   4 mg, Oral, 2 Times Daily      diazePAM 5 MG tablet  Commonly known as: VALIUM   5 mg, Oral, Every 8 Hours PRN      naloxone 4  MG/0.1ML nasal spray  Commonly known as: NARCAN   Call 911. Don't prime. [place] in 1 nostril for overdose. Repeat in 2-3 minutes in other nostril if no or minimal breathing/responsiveness.      oxyCODONE-acetaminophen 7.5-325 MG per tablet  Commonly known as: Percocet  Replaces: oxyCODONE-acetaminophen 5-325 MG per tablet   1 tablet, Oral, Every 4 Hours PRN             Continue These Medications        Instructions Start Date   gabapentin 600 MG tablet  Commonly known as: NEURONTIN   600 mg, Oral, 3 Times Daily      loratadine 10 MG tablet  Commonly known as: CLARITIN   10 mg, Oral, Daily      Stimulant Laxative 8.6-50 MG per tablet  Generic drug: sennosides-docusate   Take 1 tablet by mouth Daily. Take while using hydrocodone to prevent constipation.             Stop These Medications      methocarbamol 750 MG tablet  Commonly known as: ROBAXIN     oxyCODONE-acetaminophen 5-325 MG per tablet  Commonly known as: Percocet  Replaced by: oxyCODONE-acetaminophen 7.5-325 MG per tablet     tiZANidine 4 MG tablet  Commonly known as: Zanaflex              Follow-up Appointments  Future Appointments   Date Time Provider Department Center   1/15/2024  9:00 AM Leighann Castillo PA-C [facility]   1/19/2024  2:30 PM Africa Lowe APRN [facility]         Referring Provider  No Known Provider    PCP   Africa Lowe APRN Sara M Marshall, PA-C  12/22/23  13:54 EST

## 2023-12-29 DIAGNOSIS — M51.26 HNP (HERNIATED NUCLEUS PULPOSUS), LUMBAR: ICD-10-CM

## 2023-12-29 RX ORDER — OXYCODONE AND ACETAMINOPHEN 7.5; 325 MG/1; MG/1
1 TABLET ORAL EVERY 4 HOURS PRN
Qty: 42 TABLET | Refills: 0 | Status: SHIPPED | OUTPATIENT
Start: 2023-12-29

## 2023-12-29 RX ORDER — METHYLPREDNISOLONE 4 MG/1
TABLET ORAL
Qty: 21 TABLET | Refills: 0 | Status: CANCELLED | OUTPATIENT
Start: 2023-12-29

## 2023-12-29 RX ORDER — METHYLPREDNISOLONE 4 MG/1
TABLET ORAL
Qty: 21 TABLET | Refills: 0 | Status: SHIPPED | OUTPATIENT
Start: 2023-12-29

## 2023-12-29 RX ORDER — OXYCODONE AND ACETAMINOPHEN 7.5; 325 MG/1; MG/1
1 TABLET ORAL EVERY 4 HOURS PRN
Qty: 30 TABLET | Refills: 0 | Status: CANCELLED | OUTPATIENT
Start: 2023-12-29

## 2023-12-29 RX ORDER — OXYCODONE AND ACETAMINOPHEN 7.5; 325 MG/1; MG/1
1 TABLET ORAL EVERY 4 HOURS PRN
Qty: 42 TABLET | Refills: 0 | Status: CANCELLED | OUTPATIENT
Start: 2023-12-29 | End: 2024-01-05

## 2023-12-29 NOTE — TELEPHONE ENCOUNTER
Nelsy returned my call, she states the tramadol doesn't really help her very much. Hydrocodone helped a little better but that oxycodone helps her the best.         Attempted to reach patient, left message

## 2023-12-29 NOTE — TELEPHONE ENCOUNTER
Requested Prescriptions     Pending Prescriptions Disp Refills    oxyCODONE-acetaminophen (Percocet) 7.5-325 MG per tablet 42 tablet 0     Sig: Take 1 tablet by mouth Every 4 (Four) Hours As Needed for Moderate Pain.

## 2023-12-29 NOTE — TELEPHONE ENCOUNTER
Provider:  Dr. Shultz  Surgery/Procedure:  REDO OPEN LUMBAR DISCECTOMY L4-5   Surgery/Procedure Date:  Surgery with Medhat Shultz MD (12/20/2023)   Last visit: Office Visit with Leighann Castillo PA-C (12/14/2023)     Next visit: Appointment with Leighann Castillo PA-C (01/15/2024)      Reason for call:   Patient is requesting a refill on Percocet    GUILLERMINA   12/22/2023 [id number] Diazepam 5MG * Overlap * Medhat Shultz [facility]   PHARMACY  30 10 04 [place]  Date Written New/Refill Dosage Form Prescriber [facility]  12/22/2023 [facility]  Pat ID Date Filled RX # Drug Name Prescriber Name Dispenser Name Qty Days MED PMT Rpt To  1 12/22/2023 [id number] Oxycodone/Acetaminophen 325MG/7.5MG *   Overlap *  Medhat Shultz [facility]   PHARMACY  30 5 68 04 [place]  Date Written New/Refill Dosage Form Prescriber [facility]  12/22/2023 [facility]  Pat ID Date Filled RX # Drug Name Prescriber Name Dispenser Name Qty Days MED PMT Rpt To  1 12/14/2023 [id number] Oxycodone/Acetaminophen 325MG/5MG Medhat Shultz [facility]   PHARMACY  28 5 42 04 [place]  Date Written New/Refill Dosage Form Prescriber [facility]  12/14/2023 [facility]

## 2024-01-08 ENCOUNTER — OFFICE VISIT (OUTPATIENT)
Dept: NEUROSURGERY | Facility: CLINIC | Age: 34
End: 2024-01-08
Payer: COMMERCIAL

## 2024-01-08 VITALS — TEMPERATURE: 97.3 F | HEIGHT: 66 IN | WEIGHT: 219.3 LBS | BODY MASS INDEX: 35.24 KG/M2

## 2024-01-08 DIAGNOSIS — M51.26 LUMBAR HERNIATED DISC: Primary | ICD-10-CM

## 2024-01-08 DIAGNOSIS — M51.26 HNP (HERNIATED NUCLEUS PULPOSUS), LUMBAR: ICD-10-CM

## 2024-01-08 DIAGNOSIS — Z98.890 S/P DISCECTOMY: Primary | ICD-10-CM

## 2024-01-08 PROCEDURE — 99024 POSTOP FOLLOW-UP VISIT: CPT

## 2024-01-08 RX ORDER — HYDROCODONE BITARTRATE AND ACETAMINOPHEN 7.5; 325 MG/1; MG/1
1 TABLET ORAL EVERY 6 HOURS PRN
Qty: 28 TABLET | Refills: 0 | Status: SHIPPED | OUTPATIENT
Start: 2024-01-08

## 2024-01-08 NOTE — PROGRESS NOTES
"Nelsy Bill  1990 01/08/2024  [id number]    CC: s/p     HPI: Nelsy Bill is a 33 y.o. female who is about 3 weeks s/p redo right L4-5 discectomy with Dr. Shultz.  Patient previously underwent right L4-5 microdiscectomy in November 2023 for severe right leg pain.  She subsequently reherniated which caused return of her pain.  Due to the severe pain, we opted for redo right L4-5 discectomy.  Postoperatively, patient reports improvement in her right leg symptoms.  She states most of her pain is in her back.  She does have intermittent tingling of the toes of her right leg specifically when she sits.  The longer she sits, the numbness starts moving up her calf.  Denies any issues with her incision.  She does have some mild bruising around the incision but denies any drainage.      Past Medical History:   Diagnosis Date    Abnormal ECG 11/22/2023    Tachycardia    Brain concussion 2009    Soccer related    HTN in pregnancy, chronic 09/22/2021    Lumbosacral disc disease     Positive testing for group B Streptococcus 05/07/2018    Preeclampsia 2010    Varicella 2000       Allergies   Allergen Reactions    Mastisol [Wound Dressing Adhesive] Rash     \"a terrible fire burning, blistered rash\"    Other Rash     Steri-strips         Current Outpatient Medications:     diazePAM (VALIUM) 5 MG tablet, Take 1 tablet by mouth Every 8 (Eight) Hours As Needed for Muscle Spasms., Disp: 30 tablet, Rfl: 0    gabapentin (NEURONTIN) 600 MG tablet, Take 1 tablet by mouth 3 (Three) Times a Day., Disp: 90 tablet, Rfl: 0    loratadine (CLARITIN) 10 MG tablet, Take 1 tablet by mouth Daily. (Patient taking differently: Take 1 tablet by mouth Daily As Needed.), Disp: 30 tablet, Rfl: 0    Review of Systems   Constitutional:  Negative for activity change, appetite change, chills, diaphoresis, fatigue, fever and unexpected weight change.   HENT:  Negative for congestion, dental problem, drooling, ear discharge, ear " "pain, facial swelling, hearing loss, mouth sores, nosebleeds, postnasal drip, rhinorrhea, sinus pressure, sneezing, sore throat, tinnitus, trouble swallowing and voice change.    Eyes:  Negative for photophobia, pain, discharge, redness, itching and visual disturbance.   Respiratory:  Negative for apnea, cough, choking, chest tightness, shortness of breath, wheezing and stridor.    Cardiovascular:  Negative for chest pain, palpitations and leg swelling.   Gastrointestinal:  Negative for abdominal distention, abdominal pain, anal bleeding, blood in stool, constipation, diarrhea, nausea, rectal pain and vomiting.   Endocrine: Negative for cold intolerance, heat intolerance, polydipsia, polyphagia and polyuria.   Genitourinary:  Negative for decreased urine volume, difficulty urinating, dysuria, enuresis, flank pain, frequency, genital sores, hematuria and urgency.   Musculoskeletal:  Positive for back pain. Negative for arthralgias, gait problem, joint swelling, myalgias, neck pain and neck stiffness.   Skin:  Negative for color change, pallor, rash and wound.   Allergic/Immunologic: Negative for environmental allergies, food allergies and immunocompromised state.   Neurological:  Positive for numbness. Negative for dizziness, tremors, seizures, syncope, facial asymmetry, speech difficulty, weakness, light-headedness and headaches.   Hematological:  Negative for adenopathy. Does not bruise/bleed easily.   Psychiatric/Behavioral:  Negative for agitation, behavioral problems, confusion, decreased concentration, dysphoric mood, hallucinations, self-injury, sleep disturbance and suicidal ideas. The patient is not nervous/anxious and is not hyperactive.    All other systems reviewed and are negative.        PE:  Temp 97.3 °F (36.3 °C) (Infrared)   Ht 167.6 cm (66\")   Wt 99.5 kg (219 lb 4.8 oz)   BMI 35.40 kg/m²     Neurologic Exam   5/5 right plantar and dorsiflexion  Incision healing well    Social History    Tobacco " Use      Smoking status: Never      Smokeless tobacco: Never       Tobacco Use: Low Risk  (1/8/2024)    Patient History     Smoking Tobacco Use: Never     Smokeless Tobacco Use: Never     Passive Exposure: Never       STEADI Fall Risk Assessment has not been completed.      Diagnoses and all orders for this visit:    1. S/P discectomy (Primary)    2. HNP (herniated nucleus pulposus), lumbar  -     XR Spine Lumbar Complete With Flex & Ext; Future       Assessment/Plan  This is a 33 y.o. female who is about 3 weeks s/p redo L4-5 discectomy with Dr. Shultz.  Patient is overall improved since surgery.  She notes most of her pain is in her back.  Denies any constant radicular pain.  Incision healing well.  We will reduce her pain medications to [place] 7.5 and have her return in 3 weeks with flexion-extension x-rays.  Activities and restrictions were discussed. Wound care was discussed with the patient. Patient encouraged to contact us if she has any changes in her condition or any concerns.    Any copied data from previous notes included in the (1) HPI, (2) PE, (3) MDM and/or Assessment and Plan has been reviewed and is accurate as of 01/08/24    Leighann Castillo PA-C  01/08/24

## 2024-01-09 DIAGNOSIS — M54.16 LUMBAR RADICULOPATHY: ICD-10-CM

## 2024-01-10 RX ORDER — GABAPENTIN 600 MG/1
600 TABLET ORAL 3 TIMES DAILY
Qty: 90 TABLET | Refills: 0 | Status: SHIPPED | OUTPATIENT
Start: 2024-01-10

## 2024-01-10 NOTE — TELEPHONE ENCOUNTER
Provider:  Car[place]  Surgery/Procedure:  Redo open lumbar discectomy L4-5  Surgery/Procedure Date:  12/20/23  Last visit:   1/8/24  Next visit: 1/29/24     Reason for call:  Refill request for gabapentin.    Agustin:    Pat ID Date Filled RX # Drug Name Prescriber Name Dispenser Name Qty Days MED PMT Rpt To  1 01/08/2024 [id number] Hydrocodone/Acetaminophen   325MG/7.5MG  Medhat Shultz [facility]   PHARMACY  28 7 30 04 [place]  Date Written New/Refill Dosage Form Prescriber [facility]  01/08/2024 [facility]  Pat ID Date Filled RX # Drug Name Prescriber Name Dispenser Name Qty Days MED PMT Rpt To  1 12/29/2023 [id number] Oxycodone/Acetaminophen 325MG/7.5MG *   Overlap *  Carrington Medhat [facility]   PHARMACY  42 [phone] [place]  Date Written New/Refill Dosage Form Prescriber [facility]  12/29/2023 [facility]  Pat ID Date Filled RX # Drug Name Prescriber Name Dispenser Name Qty Days MED PMT Rpt To  1 12/29/2023 [id number] Phentermine Hcl 37.5MG [facility]   PHARMACY  30 30 01 [place]  Date Written New/Refill Dosage Form Prescriber [facility]  10/18/2023 Refill [facility]  Pat ID Date Filled RX # Drug Name Prescriber Name Dispenser Name Qty Days MED PMT Rpt To  1 12/22/2023 [id number] Diazepam 5MG * Overlap * Medhat Shultz [facility]   PHARMACY  30 10 04 [place]

## 2024-01-15 DIAGNOSIS — M51.26 LUMBAR HERNIATED DISC: ICD-10-CM

## 2024-01-15 NOTE — TELEPHONE ENCOUNTER
Provider:  Car[place]  Surgery/Procedure:  REDO OPEN LUMBAR DISCECTOMY L4-5   Surgery/Procedure Date:  Surgery with Medhat Shultz MD (12/20/2023)   Last visit:  Office Visit with Leighann Castillo PA-C (01/08/2024)    Next visit: Appointment with Leighann Castillo PA-C (01/29/2024)      Reason for call:     Requested Prescriptions     Pending Prescriptions Disp Refills    HYDROcodone-acetaminophen (NORCO) 7.5-325 MG per tablet 28 tablet 0     Sig: Take 1 tablet by mouth Every 6 (Six) Hours As Needed for Moderate Pain.            at ID Date Filled RX # Drug Name Prescriber Name Dispenser Name Qty Days MED PMT Rpt To  1 01/08/2024 [id number] Hydrocodone/Acetaminophen   325MG/7.5MG  Medhat Shultz [facility]   PHARMACY  28 7 30 04 [place]  Date Written New/Refill Dosage Form Prescriber [facility]  01/08/2024 [facility]  Pat ID Date Filled RX # Drug Name Prescriber Name Dispenser Name Qty Days MED PMT Rpt To  1 12/29/2023 [id number] Oxycodone/Acetaminophen 325MG/7.5MG *   Overlap *  Medhat Shultz [facility]   PHARMACY  42 7 68 04 [place]  Date Written New/Refill Dosage Form Prescriber [facility]  12/29/2023 [facility]  Pat ID Date Filled RX # Drug Name Prescriber Name Dispenser Name Qty Days MED PMT Rpt To  1 12/29/2023 [id number] Phentermine Hcl 37.5MG [place] Lowe [facility]   PHARMACY  30 30 01 [place]  Date Written New/Refill Dosage Form Prescriber [facility]  10/18/2023 [facility]  Pat ID Date Filled RX # Drug Name Prescriber Name Dispenser Name Qty Days MED PMT Rpt To  1 12/22/2023 [id number] Diazepam 5MG * Overlap * Medhat Shultz [facility]   PHARMACY  30 10 04 [place]

## 2024-01-16 RX ORDER — HYDROCODONE BITARTRATE AND ACETAMINOPHEN 7.5; 325 MG/1; MG/1
1 TABLET ORAL EVERY 6 HOURS PRN
Qty: 28 TABLET | Refills: 0 | Status: SHIPPED | OUTPATIENT
Start: 2024-01-16

## 2024-01-19 ENCOUNTER — OFFICE VISIT (OUTPATIENT)
Dept: FAMILY MEDICINE CLINIC | Facility: CLINIC | Age: 34
End: 2024-01-19
Payer: COMMERCIAL

## 2024-01-19 ENCOUNTER — TELEPHONE (OUTPATIENT)
Dept: FAMILY MEDICINE CLINIC | Facility: CLINIC | Age: 34
End: 2024-01-19

## 2024-01-19 VITALS
OXYGEN SATURATION: 99 % | SYSTOLIC BLOOD PRESSURE: 120 MMHG | HEIGHT: 66 IN | DIASTOLIC BLOOD PRESSURE: 85 MMHG | HEART RATE: 99 BPM | WEIGHT: 216 LBS | BODY MASS INDEX: 34.72 KG/M2

## 2024-01-19 DIAGNOSIS — M51.26 LUMBAR HERNIATED DISC: ICD-10-CM

## 2024-01-19 DIAGNOSIS — M54.16 LUMBAR RADICULOPATHY: ICD-10-CM

## 2024-01-19 DIAGNOSIS — M51.26 LUMBAR HERNIATED DISC: Primary | ICD-10-CM

## 2024-01-19 DIAGNOSIS — Z98.890 S/P DISCECTOMY: Primary | ICD-10-CM

## 2024-01-19 RX ORDER — TIZANIDINE 4 MG/1
TABLET ORAL
COMMUNITY
Start: 2024-01-11 | End: 2024-01-19

## 2024-01-19 RX ORDER — METHOCARBAMOL 750 MG/1
TABLET, FILM COATED ORAL
COMMUNITY
Start: 2024-01-05

## 2024-01-19 RX ORDER — TIZANIDINE 4 MG/1
6 TABLET ORAL NIGHTLY
Qty: 45 TABLET | Refills: 2 | Status: SHIPPED | OUTPATIENT
Start: 2024-01-19

## 2024-01-19 RX ORDER — TIZANIDINE HYDROCHLORIDE 6 MG/1
6 CAPSULE, GELATIN COATED ORAL NIGHTLY
Qty: 30 CAPSULE | Refills: 3 | Status: SHIPPED | OUTPATIENT
Start: 2024-01-19 | End: 2024-01-19 | Stop reason: SDUPTHER

## 2024-01-19 NOTE — TELEPHONE ENCOUNTER
Pharmacy Name: [address] - [phone] [facility] [phone] [place]     Reference Number (if applicable): N/A    Pharmacy representative name: ERICK    Pharmacy representative phone number: [phone]     What medication are you calling in regards to: TiZANidine (Zanaflex) 6 MG capsule     What question does the pharmacy have: ERICK WITH [facility] ADVISED THE 6 MG CAPSULE IS NOT COVERED BY THE PATIENT'S INSURANCE AND IS EITHER REQUESTING THE PRESCRIPTION BE CHANGED FROM ONE 6 MG CAPSULE TO ONE AND ONE HALF 4 MG TABLETS (NOT CAPSULES) OR TRY IS A PRIOR AUTHORIZATION MIGHT WORK.     Who is the provider that prescribed the medication: JULIANNE CORTÉS     Additional notes: PLEASE CALL AND ADVISE      Alisson Franco CMA ([facility])......................8/11/2022  1:14 PM

## 2024-01-19 NOTE — PROGRESS NOTES
"      Subjective     Chief Complaint:    Chief Complaint   Patient presents with    Back Problem       History of Present Illness:   3 month f/u   Back pain s/p discectomy x 3 with Dr. Jacobs, still having back pain, driving is hard so she is avoiding that, right leg pain is better, she has occasionally tingling in her lower part of her foot with upright sitting for more than 15 minutes, she takes robaxin during the day and zanaflex at night, would like to increase zanaflex as she is having a harder time resting, continues gabapentin, prn norco (uses sparingly)       Review of Systems  Gen- No fevers, chills  CV- No chest pain, palpitations  Resp- No cough, dyspnea  GI- No N/V/D, abd pain  Neuro-No dizziness, headaches      I have reviewed and/or updated the patient's past medical, surgical, family, social history and problem list as appropriate.     Medications:    Current Outpatient Medications:     diazePAM (VALIUM) 5 MG tablet, Take 1 tablet by mouth Every 8 (Eight) Hours As Needed for Muscle Spasms., Disp: 30 tablet, Rfl: 0    gabapentin (NEURONTIN) 600 MG tablet, Take 1 tablet by mouth 3 (Three) Times a Day., Disp: 90 tablet, Rfl: 0    HYDROcodone-acetaminophen (NORCO) 7.5-325 MG per tablet, Take 1 tablet by mouth Every 6 (Six) Hours As Needed for Moderate Pain., Disp: 28 tablet, Rfl: 0    loratadine (CLARITIN) 10 MG tablet, Take 1 tablet by mouth Daily. (Patient taking differently: Take 1 tablet by mouth Daily As Needed.), Disp: 30 tablet, Rfl: 0    methocarbamol (ROBAXIN) 750 MG tablet, , Disp: , Rfl:     tiZANidine (ZANAFLEX) 4 MG tablet, , Disp: , Rfl:     Allergies:  Allergies   Allergen Reactions    Mastisol [Wound Dressing Adhesive] Rash     \"a terrible fire burning, blistered rash\"    Other Rash     Steri-strips       Objective     Vital Signs:   Vitals:    01/19/24 1423   BP: 120/85   Pulse: 99   SpO2: 99%   Weight: 98 kg (216 lb)   Height: 167.6 cm (66\")     Body mass index is 34.86 " kg/m².    Physical Exam:    Physical Exam  Vitals and nursing note reviewed.   Constitutional:       Appearance: She is well-developed.   HENT:      Head: Normocephalic and atraumatic.   Eyes:      Pupils: Pupils are equal, round, and reactive to light.   Cardiovascular:      Rate and Rhythm: Normal rate and regular rhythm.      Heart sounds: Normal heart sounds.   Pulmonary:      Effort: Pulmonary effort is normal.      Breath sounds: Normal breath sounds.   Abdominal:      General: Bowel sounds are normal. There is no distension.      Palpations: Abdomen is soft.      Tenderness: There is no abdominal tenderness.   Musculoskeletal:      Cervical back: Neck supple.      Lumbar back: Tenderness present.        Back:    Skin:     General: Skin is warm and dry.   Neurological:      General: No focal deficit present.      Mental Status: She is alert and oriented to person, place, and time.   Psychiatric:         Mood and Affect: Mood normal.         Behavior: Behavior normal.         Assessment / Plan     Assessment/Plan:   Problem List Items Addressed This Visit       Lumbar herniated disc    Relevant Medications    HYDROcodone-acetaminophen (NORCO) 7.5-325 MG per tablet    S/P discectomy - Primary     -- increase zanaflex, discussed decreasing norco, ok to use voltaren, consider changing bart to lyrica    Discussed plan of care in detail with pt today; pt verb understanding and agrees.    Follow up:  6 months    Electronically signed by JULIANNE Conner   01/19/2024 14:41 EST      Please note that portions of this note were completed with a voice recognition program.

## 2024-01-22 ENCOUNTER — PRIOR AUTHORIZATION (OUTPATIENT)
Dept: FAMILY MEDICINE CLINIC | Facility: CLINIC | Age: 34
End: 2024-01-22
Payer: COMMERCIAL

## 2024-01-22 NOTE — TELEPHONE ENCOUNTER
(Key: [id number])        Denied.    Message from [employer]: Drug is not covered by plan      PA sent to insurance  Waiting for reply.    Fidelina Mondragon CMA

## 2024-01-25 ENCOUNTER — HOSPITAL ENCOUNTER (OUTPATIENT)
Dept: GENERAL RADIOLOGY | Facility: HOSPITAL | Age: 34
Discharge: HOME | End: 2024-01-25
Payer: COMMERCIAL

## 2024-01-25 DIAGNOSIS — M51.26 HNP (HERNIATED NUCLEUS PULPOSUS), LUMBAR: ICD-10-CM

## 2024-01-25 PROCEDURE — 72114 X-RAY EXAM L-S SPINE BENDING: CPT

## 2024-01-26 ENCOUNTER — OFFICE VISIT (OUTPATIENT)
Dept: NEUROSURGERY | Facility: CLINIC | Age: 34
End: 2024-01-26
Payer: COMMERCIAL

## 2024-01-26 VITALS — HEIGHT: 66 IN | TEMPERATURE: 97.1 F | WEIGHT: 212.4 LBS | BODY MASS INDEX: 34.13 KG/M2

## 2024-01-26 DIAGNOSIS — Z98.890 S/P DISCECTOMY: Primary | ICD-10-CM

## 2024-01-26 PROCEDURE — 99024 POSTOP FOLLOW-UP VISIT: CPT

## 2024-01-26 NOTE — PROGRESS NOTES
"Nelsy Bill  1990 01/26/2024  [id number]    CC: s/p right L4-5 redo discectomy    HPI: Nelsy Bill is a 33 y.o. female who is about 6 weeks s/p redo right L4-5 discectomy with Dr. Shultz.  Patient previously underwent right L4-5 microdiscectomy in November 2023 for severe right leg pain.  She subsequently reherniated which caused return of her pain.  Due to the severe pain, we opted for redo right L4-5 discectomy.  Today, she reports most of her pain is in her back.  She notes significant improvement in her right leg symptoms.  She states she has difficulty sitting for long period of time due to the intermittent tingling of the toes that on her right side that occurs.  Denies any issues with her incision.  Patient states she is taking mostly Tylenol for her back pain.    Past Medical History:   Diagnosis Date    Abnormal ECG 11/22/2023    Tachycardia    Brain concussion 2009    Soccer related    HTN in pregnancy, chronic 09/22/2021    Low back pain September 2023    Lumbosacral disc disease     Positive testing for group B Streptococcus 05/07/2018    Preeclampsia 2010    Varicella 2000       Allergies   Allergen Reactions    Mastisol [Wound Dressing Adhesive] Rash     \"a terrible fire burning, blistered rash\"    Other Rash     Steri-strips         Current Outpatient Medications:     diazePAM (VALIUM) 5 MG tablet, Take 1 tablet by mouth Every 8 (Eight) Hours As Needed for Muscle Spasms., Disp: 30 tablet, Rfl: 0    gabapentin (NEURONTIN) 600 MG tablet, Take 1 tablet by mouth 3 (Three) Times a Day., Disp: 90 tablet, Rfl: 0    HYDROcodone-acetaminophen (NORCO) 7.5-325 MG per tablet, Take 1 tablet by mouth Every 6 (Six) Hours As Needed for Moderate Pain., Disp: 28 tablet, Rfl: 0    methocarbamol (ROBAXIN) 750 MG tablet, , Disp: , Rfl:     tiZANidine (ZANAFLEX) 4 MG tablet, Take 1.5 tablets by mouth Every Night., Disp: 45 tablet, Rfl: 2    Review of Systems   Constitutional:  Negative for " activity change, appetite change, chills, diaphoresis, fatigue, fever and unexpected weight change.   HENT:  Negative for congestion, dental problem, drooling, ear discharge, ear pain, facial swelling, hearing loss, mouth sores, nosebleeds, postnasal drip, rhinorrhea, sinus pressure, sneezing, sore throat, tinnitus, trouble swallowing and voice change.    Eyes:  Negative for photophobia, pain, discharge, redness, itching and visual disturbance.   Respiratory:  Negative for apnea, cough, choking, chest tightness, shortness of breath, wheezing and stridor.    Cardiovascular:  Negative for chest pain, palpitations and leg swelling.   Gastrointestinal:  Negative for abdominal distention, abdominal pain, anal bleeding, blood in stool, constipation, diarrhea, nausea, rectal pain and vomiting.   Endocrine: Negative for cold intolerance, heat intolerance, polydipsia, polyphagia and polyuria.   Genitourinary:  Negative for decreased urine volume, difficulty urinating, dysuria, enuresis, flank pain, frequency, genital sores, hematuria and urgency.   Musculoskeletal:  Positive for back pain. Negative for arthralgias, gait problem, joint swelling, myalgias, neck pain and neck stiffness.   Skin:  Negative for color change, pallor, rash and wound.   Allergic/Immunologic: Negative for environmental allergies, food allergies and immunocompromised state.   Neurological:  Negative for dizziness, tremors, seizures, syncope, facial asymmetry, speech difficulty, weakness, light-headedness, numbness and headaches.        Occassional foot tingling   Hematological:  Negative for adenopathy. Does not bruise/bleed easily.   Psychiatric/Behavioral:  Negative for agitation, behavioral problems, confusion, decreased concentration, dysphoric mood, hallucinations, self-injury, sleep disturbance and suicidal ideas. The patient is not nervous/anxious and is not hyperactive.    All other systems reviewed and are negative.        PE:  Temp 97.1 °F  "(36.2 °C) (Infrared)   Ht 167.6 cm (66\")   Wt 96.3 kg (212 lb 6.4 oz)   BMI 34.28 kg/m²     Neurologic Exam   Motor function intact in bilateral lower extremities  Incision healed    Social History    Tobacco Use      Smoking status: Never      Smokeless tobacco: Never       Tobacco Use: Low Risk  (1/19/2024)    Patient History     Smoking Tobacco Use: Never     Smokeless Tobacco Use: Never     Passive Exposure: Never       STEADI Fall Risk Assessment has not been completed.      Diagnoses and all orders for this visit:    1. S/P discectomy (Primary)  -     Ambulatory Referral to Physical Therapy POST OP, Evaluate and treat  -     Ambulatory Referral to Pain Management       Assessment/Plan  This is a 33 y.o. female who is about 6-week status post redo right L4-5 discectomy.  Patient continues to endorse improvement in her right extremity symptoms.  She states her back pain is slowly improving and is now taking Tylenol for the back pain Norco.  Flexion-extension x-rays show no evidence of instability.  With patient's continued back pain, I have referred her to pain management for possible injections in her low back.  I have also sent her to physical therapy.  Patient will follow-up with me in 6 weeks to see how she doing.  Patient encouraged to contact us if she has any changes in her condition or any concerns.    Any copied data from previous notes included in the (1) HPI, (2) PE, (3) MDM and/or Assessment and Plan has been reviewed and is accurate as of 01/26/24    Leighann Castillo PA-C  01/26/24    "

## 2024-02-17 ENCOUNTER — DOCUMENTATION (OUTPATIENT)
Dept: FAMILY MEDICINE CLINIC | Facility: CLINIC | Age: 34
End: 2024-02-17
Payer: COMMERCIAL

## 2024-02-17 DIAGNOSIS — M25.572 ACUTE LEFT ANKLE PAIN: ICD-10-CM

## 2024-02-17 DIAGNOSIS — M79.672 LEFT FOOT PAIN: Primary | ICD-10-CM

## 2024-03-07 ENCOUNTER — OFFICE VISIT (OUTPATIENT)
Dept: NEUROSURGERY | Facility: CLINIC | Age: 34
End: 2024-03-07
Payer: COMMERCIAL

## 2024-03-07 VITALS
TEMPERATURE: 97.8 F | DIASTOLIC BLOOD PRESSURE: 66 MMHG | SYSTOLIC BLOOD PRESSURE: 124 MMHG | HEIGHT: 66 IN | BODY MASS INDEX: 34.55 KG/M2 | WEIGHT: 215 LBS

## 2024-03-07 DIAGNOSIS — Z98.890 S/P DISCECTOMY: Primary | ICD-10-CM

## 2024-03-07 NOTE — PROGRESS NOTES
"Nelsy Bill  1990 03/07/2024  [id number]    CC: s/p redo L4-5 discectomy    HPI: Nelsy Bill is a 33 y.o. female who is about 2.5 months s/p open redo L4-5 discectomy with Dr. Shultz.  Today, patient reports continued improvement of her lower extremity symptoms.  She states she will have an occasional right leg pain however states this is becoming more infrequent.  She continues to deal with some back pain that is worse with extended sitting or driving.  She did not try physical therapy given at her last appointment due to change in job/insurance.  She overall feels improved.      Past Medical History:   Diagnosis Date    Abnormal ECG 11/22/2023    Tachycardia    Brain concussion 2009    Soccer related    HTN in pregnancy, chronic 09/22/2021    Low back pain September 2023    Lumbosacral disc disease     Positive testing for group B Streptococcus 05/07/2018    Preeclampsia 2010    Varicella 2000       Allergies   Allergen Reactions    Mastisol [Wound Dressing Adhesive] Rash     \"a terrible fire burning, blistered rash\"    Other Rash     Steri-strips       No current outpatient medications on file.    Review of Systems   Constitutional:  Negative for activity change, appetite change, chills, diaphoresis, fatigue, fever and unexpected weight change.   HENT:  Negative for congestion, dental problem, drooling, ear discharge, ear pain, facial swelling, hearing loss, mouth sores, nosebleeds, postnasal drip, rhinorrhea, sinus pressure, sinus pain, sneezing, sore throat, tinnitus, trouble swallowing and voice change.    Eyes:  Negative for photophobia, pain, discharge, redness, itching and visual disturbance.   Respiratory:  Negative for apnea, cough, choking, chest tightness, shortness of breath, wheezing and stridor.    Cardiovascular:  Negative for chest pain, palpitations and leg swelling.   Gastrointestinal:  Negative for abdominal distention, abdominal pain, anal bleeding, blood in " "stool, constipation, diarrhea, nausea, rectal pain and vomiting.   Endocrine: Negative for cold intolerance, heat intolerance, polydipsia, polyphagia and polyuria.   Genitourinary:  Negative for decreased urine volume, difficulty urinating, dyspareunia, dysuria, enuresis, flank pain, frequency, genital sores, hematuria, menstrual problem, pelvic pain, urgency, vaginal bleeding, vaginal discharge and vaginal pain.   Musculoskeletal:  Negative for arthralgias, back pain, gait problem, joint swelling, myalgias, neck pain and neck stiffness.   Skin:  Negative for color change, pallor, rash and wound.   Allergic/Immunologic: Negative for environmental allergies, food allergies and immunocompromised state.   Neurological:  Negative for dizziness, tremors, seizures, syncope, facial asymmetry, speech difficulty, weakness, light-headedness, numbness and headaches.   Hematological:  Negative for adenopathy. Does not bruise/bleed easily.   Psychiatric/Behavioral:  Negative for agitation, behavioral problems, confusion, decreased concentration, dysphoric mood, hallucinations, self-injury, sleep disturbance and suicidal ideas. The patient is not nervous/anxious and is not hyperactive.          PE:  /66 (BP Location: Right arm, Patient Position: Sitting, Cuff Size: Adult)   Temp 97.8 °F (36.6 °C) (Infrared)   Ht 167.6 cm (66\")   Wt 97.5 kg (215 lb)   BMI 34.70 kg/m²     Neurologic Exam   5/5 in all 4  Incision healed    Social History    Tobacco Use      Smoking status: Never        Passive exposure: Never      Smokeless tobacco: Never       Tobacco Use: Low Risk  (3/7/2024)    Patient History     Smoking Tobacco Use: Never     Smokeless Tobacco Use: Never     Passive Exposure: Never         STEADI Fall Risk Assessment has not been completed.      Diagnoses and all orders for this visit:    1. S/P discectomy (Primary)  -     Ambulatory Referral to Physical Therapy Evaluate and treat, POST OP; Stretching, ROM, " "Strengthening       Assessment/Plan  This is a 33 y.o. female who is about 2 months s/p redo L4-5 discectomy with Dr. Shultz.  Patient overall is improved since surgery.  She has an occasional \"twinge\" in her right leg however this is becoming more infrequent.  She will have pain in her low back with extended sitting or driving.  I have given her a new referral to PT to hopefully work on some of that back pain.  At this point, I think she can follow-up on an as-needed basis.  Patient aware that if she has any new or worsening symptoms, she can call the office and I be happy to see her back.  Patient encouraged to contact us if she has any changes in her condition or any concerns.    Any copied data from previous notes included in the (1) HPI, (2) PE, (3) MDM and/or Assessment and Plan has been reviewed and is accurate as of 03/07/24    Leighann Castillo PA-C  03/07/24    "

## 2024-03-22 DIAGNOSIS — E66.09 CLASS 2 OBESITY DUE TO EXCESS CALORIES WITHOUT SERIOUS COMORBIDITY WITH BODY MASS INDEX (BMI) OF 38.0 TO 38.9 IN ADULT: ICD-10-CM

## 2024-03-22 RX ORDER — PHENTERMINE HYDROCHLORIDE 37.5 MG/1
37.5 TABLET ORAL
Qty: 30 TABLET | Refills: 1 | Status: SHIPPED | OUTPATIENT
Start: 2024-03-22

## 2024-04-20 ENCOUNTER — DOCUMENTATION (OUTPATIENT)
Dept: FAMILY MEDICINE CLINIC | Facility: CLINIC | Age: 34
End: 2024-04-20
Payer: COMMERCIAL

## 2024-04-20 RX ORDER — FLUTICASONE FUROATE AND VILANTEROL 100; 25 UG/1; UG/1
1 POWDER RESPIRATORY (INHALATION)
Qty: 60 EACH | Refills: 2 | Status: SHIPPED | OUTPATIENT
Start: 2024-04-20

## 2024-06-04 DIAGNOSIS — E66.09 CLASS 2 OBESITY DUE TO EXCESS CALORIES WITHOUT SERIOUS COMORBIDITY WITH BODY MASS INDEX (BMI) OF 38.0 TO 38.9 IN ADULT: ICD-10-CM

## 2024-06-04 NOTE — TELEPHONE ENCOUNTER
Rx Refill Note  Requested Prescriptions     Pending Prescriptions Disp Refills    phentermine (Adipex-P) 37.5 MG tablet 30 tablet 1     Sig: Take 1 tablet by mouth Every Morning Before Breakfast.      Last office visit with prescribing clinician: 1/19/2024   Last telemedicine visit with prescribing clinician: Visit date not found   Next office visit with prescribing clinician: Visit date not found     Ok to fill?        Nirmala Arambula MA  06/04/24, 16:15 EDT

## 2024-06-05 RX ORDER — PHENTERMINE HYDROCHLORIDE 37.5 MG/1
37.5 TABLET ORAL
Qty: 30 TABLET | Refills: 1 | Status: SHIPPED | OUTPATIENT
Start: 2024-06-05

## 2024-06-26 RX ORDER — CEFUROXIME AXETIL 250 MG/1
250 TABLET ORAL 2 TIMES DAILY
Qty: 10 TABLET | Refills: 0 | Status: SHIPPED | OUTPATIENT
Start: 2024-06-26 | End: 2024-07-01

## 2024-07-23 RX ORDER — FLUTICASONE FUROATE AND VILANTEROL TRIFENATATE 100; 25 UG/1; UG/1
1 POWDER RESPIRATORY (INHALATION) DAILY
Qty: 60 EACH | Refills: 2 | Status: SHIPPED | OUTPATIENT
Start: 2024-07-23

## 2024-09-03 RX ORDER — METHOCARBAMOL 750 MG/1
750 TABLET, FILM COATED ORAL 3 TIMES DAILY
Qty: 30 TABLET | Refills: 1 | Status: SHIPPED | OUTPATIENT
Start: 2024-09-03

## 2024-10-16 ENCOUNTER — DOCUMENTATION (OUTPATIENT)
Dept: FAMILY MEDICINE CLINIC | Facility: CLINIC | Age: 34
End: 2024-10-16
Payer: COMMERCIAL

## 2024-10-16 RX ORDER — AMOXICILLIN 875 MG
875 TABLET ORAL 2 TIMES DAILY
Qty: 20 TABLET | Refills: 0 | Status: SHIPPED | OUTPATIENT
Start: 2024-10-16 | End: 2024-10-26

## 2025-08-27 ENCOUNTER — DOCUMENTATION (OUTPATIENT)
Dept: FAMILY MEDICINE CLINIC | Facility: CLINIC | Age: 35
End: 2025-08-27
Payer: COMMERCIAL

## 2025-08-27 RX ORDER — ALBUTEROL SULFATE 90 UG/1
2 INHALANT RESPIRATORY (INHALATION) EVERY 4 HOURS PRN
Qty: 8 G | Refills: 1 | Status: SHIPPED | OUTPATIENT
Start: 2025-08-27

## 2025-08-27 RX ORDER — PREDNISONE 20 MG/1
40 TABLET ORAL DAILY
Qty: 10 TABLET | Refills: 0 | Status: SHIPPED | OUTPATIENT
Start: 2025-08-27 | End: 2025-09-01

## 2025-08-27 RX ORDER — DOXYCYCLINE 100 MG/1
100 CAPSULE ORAL 2 TIMES DAILY
Qty: 10 CAPSULE | Refills: 0 | Status: SHIPPED | OUTPATIENT
Start: 2025-08-27 | End: 2025-09-01

## (undated) DEVICE — STRAP POSTN KN/BDY FM 5X72IN DISP

## (undated) DEVICE — SNAP KOVER: Brand: UNBRANDED

## (undated) DEVICE — RICH C-SECTION: Brand: MEDLINE INDUSTRIES, INC.

## (undated) DEVICE — NDL HYPO ECLPS SFTY 25G 1 1/2IN

## (undated) DEVICE — PK NEURO DISC 10

## (undated) DEVICE — SUT GUT CHRM 1 CTX 36IN 905H

## (undated) DEVICE — GLV SURG PREMIERPRO MIC LTX PF SZ7.5 BRN

## (undated) DEVICE — ANTIBACTERIAL UNDYED BRAIDED (POLYGLACTIN 910), SYNTHETIC ABSORBABLE SUTURE: Brand: COATED VICRYL

## (undated) DEVICE — BLANKT WARM UPPR/BDY ARM/OUT 57X196CM

## (undated) DEVICE — TOOL MR8-14MH30 MR8 14CM MATCH 3MM: Brand: MIDAS REX MR8

## (undated) DEVICE — SUT PDS 0 CT 36IN DYED Z358T

## (undated) DEVICE — CLIP FALLOP FILSHIE TI PR STRL BX/20: Type: IMPLANTABLE DEVICE | Status: NON-FUNCTIONAL

## (undated) DEVICE — CUFF SCD HEMOFORCE SEQ CALF STD MD

## (undated) DEVICE — SUTURE GUT CHROMIC 3/0 912H

## (undated) DEVICE — SUT GUT CHRM 2/0 SH 27IN G123H

## (undated) DEVICE — DISPOSABLE BIPOLAR FORCEPS 7 3/4" (19.7CM) SCOVILLE BAYONET, INSULATED, 1.5MM TIP AND 12 FT. (3.6M) CABLE: Brand: KIRWAN

## (undated) DEVICE — SPNG GZ WOVN 4X4IN 12PLY 10/BX STRL

## (undated) DEVICE — PENCL ES MEGADINE EZ/CLEAN BUTN W/HOLSTR 10FT

## (undated) DEVICE — SOL IRR NACL 0.9PCT BT 1000ML

## (undated) DEVICE — PROXIMATE SKIN STAPLERS (35 WIDE) CONTAINS 35 STAINLESS STEEL STAPLES (FIXED HEAD): Brand: PROXIMATE

## (undated) DEVICE — HDRST INTUB GENTLETOUCH SLOT 7IN RT

## (undated) DEVICE — DRSNG WND GZ PAD BORDERED 4X8IN STRL

## (undated) DEVICE — CUP EXTRCT VAC

## (undated) DEVICE — GLV SURG BIOGEL M LTX PF 6 1/2

## (undated) DEVICE — PAD,ABDOMINAL,8"X10",ST,LF: Brand: MEDLINE

## (undated) DEVICE — PENCL ROCKRSWCH MEGADYNE W/HOLSTR 10FT SS

## (undated) DEVICE — SUT VIC 0 CTD BR 18IN UNDYE VCP724D

## (undated) DEVICE — GAMMEX® NON-LATEX SIZE 7.5, STERILE NEOPRENE POWDER-FREE SURGICAL GLOVE: Brand: GAMMEX

## (undated) DEVICE — GOWN,SIRUS,NON REINFRCD,LARGE,SET IN SL: Brand: MEDLINE

## (undated) DEVICE — PCH INST SURG INVISISHIELD 2PCKT

## (undated) DEVICE — SYR CONTRL PRESS/LO FIX/M/LL W/THMB/RNG 10ML

## (undated) DEVICE — Device

## (undated) DEVICE — SLV SCD CALF HEMOFORCE DVT THERP REPROC MD

## (undated) DEVICE — PATIENT RETURN ELECTRODE, SINGLE-USE, CONTACT QUALITY MONITORING, ADULT, WITH 9FT CORD, FOR PATIENTS WEIGING OVER 33LBS. (15KG): Brand: MEGADYNE

## (undated) DEVICE — SAFESECURE,SECUREMENT,FOLEY CATH,STERILE: Brand: MEDLINE

## (undated) DEVICE — ADHS SKIN PREMIERPRO EXOFIN TOPICAL HI/VISC .5ML

## (undated) DEVICE — SUT GUT PLN 0 STD TIE 54IN S104H

## (undated) DEVICE — LARGE, DISPOSABLE ALEXIS O C-SECTION PROTECTOR - RETRACTOR: Brand: ALEXIS ® O C-SECTION PROTECTOR - RETRACTOR

## (undated) DEVICE — APPL CHLORAPREP W/TINT 26ML ORNG

## (undated) DEVICE — ADHESIVE ISLAND DRESSING: Brand: TELFA

## (undated) DEVICE — ELECTRD BLD EZ CLN MOD 6.5IN